# Patient Record
Sex: MALE | Race: WHITE | Employment: UNEMPLOYED | URBAN - NONMETROPOLITAN AREA
[De-identification: names, ages, dates, MRNs, and addresses within clinical notes are randomized per-mention and may not be internally consistent; named-entity substitution may affect disease eponyms.]

---

## 2020-02-18 ENCOUNTER — HOSPITAL ENCOUNTER (INPATIENT)
Age: 60
LOS: 2 days | Discharge: HOME | DRG: 638 | End: 2020-02-20
Attending: EMERGENCY MEDICINE | Admitting: INTERNAL MEDICINE
Payer: OTHER GOVERNMENT

## 2020-02-18 PROBLEM — E87.20 ACIDOSIS: Status: ACTIVE | Noted: 2020-02-18

## 2020-02-18 PROBLEM — Z79.4 TYPE 2 DIABETES MELLITUS TREATED WITH INSULIN (HCC): Status: ACTIVE | Noted: 2020-02-18

## 2020-02-18 PROBLEM — E11.9 TYPE 2 DIABETES MELLITUS TREATED WITH INSULIN (HCC): Status: ACTIVE | Noted: 2020-02-18

## 2020-02-18 PROBLEM — E11.10 DKA, TYPE 2, NOT AT GOAL (HCC): Status: ACTIVE | Noted: 2020-02-18

## 2020-02-18 LAB
-: ABNORMAL
ABSOLUTE EOS #: <0.03 K/UL (ref 0–0.44)
ABSOLUTE IMMATURE GRANULOCYTE: 0.26 K/UL (ref 0–0.3)
ABSOLUTE LYMPH #: 1.72 K/UL (ref 1.1–3.7)
ABSOLUTE MONO #: 1.29 K/UL (ref 0.1–1.2)
ALLEN TEST: ABNORMAL
ALLEN TEST: ABNORMAL
AMORPHOUS: ABNORMAL
ANION GAP SERPL CALCULATED.3IONS-SCNC: 15 MMOL/L (ref 9–17)
ANION GAP SERPL CALCULATED.3IONS-SCNC: 39 MMOL/L (ref 9–17)
BACTERIA: ABNORMAL
BASOPHILS # BLD: 0 % (ref 0–2)
BASOPHILS ABSOLUTE: 0.09 K/UL (ref 0–0.2)
BETA-HYDROXYBUTYRATE: 10.82 MMOL/L (ref 0.02–0.27)
BILIRUBIN URINE: NEGATIVE
BUN BLDV-MCNC: 40 MG/DL (ref 6–20)
BUN BLDV-MCNC: 40 MG/DL (ref 6–20)
BUN/CREAT BLD: 24 (ref 9–20)
BUN/CREAT BLD: 40 (ref 9–20)
CALCIUM SERPL-MCNC: 8.2 MG/DL (ref 8.6–10.4)
CALCIUM SERPL-MCNC: 9.4 MG/DL (ref 8.6–10.4)
CARBOXYHEMOGLOBIN: ABNORMAL % (ref 0–5)
CARBOXYHEMOGLOBIN: ABNORMAL % (ref 0–5)
CASTS UA: ABNORMAL /LPF
CASTS UA: ABNORMAL /LPF
CHLORIDE BLD-SCNC: 79 MMOL/L (ref 98–107)
CHLORIDE BLD-SCNC: 94 MMOL/L (ref 98–107)
CHP ED QC CHECK: YES
CO2: 19 MMOL/L (ref 20–31)
CO2: 9 MMOL/L (ref 20–31)
COLOR: YELLOW
COMMENT UA: ABNORMAL
CREAT SERPL-MCNC: 1 MG/DL (ref 0.7–1.2)
CREAT SERPL-MCNC: 1.68 MG/DL (ref 0.7–1.2)
CRYSTALS, UA: ABNORMAL /HPF
DIFFERENTIAL TYPE: ABNORMAL
EOSINOPHILS RELATIVE PERCENT: 0 % (ref 1–4)
EPITHELIAL CELLS UA: ABNORMAL /HPF (ref 0–5)
FIO2: 21
FIO2: 21
GFR AFRICAN AMERICAN: 51 ML/MIN
GFR AFRICAN AMERICAN: >60 ML/MIN
GFR NON-AFRICAN AMERICAN: 42 ML/MIN
GFR NON-AFRICAN AMERICAN: >60 ML/MIN
GFR SERPL CREATININE-BSD FRML MDRD: ABNORMAL ML/MIN/{1.73_M2}
GLUCOSE BLD-MCNC: 141 MG/DL (ref 74–100)
GLUCOSE BLD-MCNC: 146 MG/DL (ref 74–100)
GLUCOSE BLD-MCNC: 157 MG/DL (ref 74–100)
GLUCOSE BLD-MCNC: 166 MG/DL (ref 74–100)
GLUCOSE BLD-MCNC: 182 MG/DL (ref 74–100)
GLUCOSE BLD-MCNC: 231 MG/DL (ref 74–100)
GLUCOSE BLD-MCNC: 233 MG/DL (ref 74–100)
GLUCOSE BLD-MCNC: 259 MG/DL (ref 70–99)
GLUCOSE BLD-MCNC: 259 MG/DL (ref 74–100)
GLUCOSE BLD-MCNC: 269 MG/DL
GLUCOSE BLD-MCNC: 269 MG/DL (ref 74–100)
GLUCOSE BLD-MCNC: 300 MG/DL
GLUCOSE BLD-MCNC: 300 MG/DL (ref 74–100)
GLUCOSE BLD-MCNC: 389 MG/DL
GLUCOSE BLD-MCNC: 389 MG/DL (ref 74–100)
GLUCOSE BLD-MCNC: 555 MG/DL
GLUCOSE BLD-MCNC: 555 MG/DL (ref 74–100)
GLUCOSE BLD-MCNC: 649 MG/DL (ref 70–99)
GLUCOSE BLD-MCNC: >600 MG/DL (ref 74–100)
GLUCOSE BLD-MCNC: NORMAL MG/DL
GLUCOSE URINE: ABNORMAL
HCO3 ARTERIAL: 13.6 MMOL/L (ref 22–26)
HCO3 ARTERIAL: 9.3 MMOL/L (ref 22–26)
HCT VFR BLD CALC: 44.6 % (ref 40.7–50.3)
HEMOGLOBIN: 14.7 G/DL (ref 13–17)
IMMATURE GRANULOCYTES: 1 %
KETONES, URINE: ABNORMAL
LACTIC ACID, SEPSIS WHOLE BLOOD: ABNORMAL MMOL/L (ref 0.5–1.9)
LACTIC ACID, SEPSIS: 3.9 MMOL/L (ref 0.5–1.9)
LACTIC ACID, WHOLE BLOOD: NORMAL MMOL/L (ref 0.7–2.1)
LACTIC ACID: 1.7 MMOL/L (ref 0.5–2.2)
LEUKOCYTE ESTERASE, URINE: NEGATIVE
LYMPHOCYTES # BLD: 8 % (ref 24–43)
MAGNESIUM: 1.9 MG/DL (ref 1.6–2.6)
MCH RBC QN AUTO: 31.9 PG (ref 25.2–33.5)
MCHC RBC AUTO-ENTMCNC: 33 G/DL (ref 28.4–34.8)
MCV RBC AUTO: 96.7 FL (ref 82.6–102.9)
METHEMOGLOBIN: ABNORMAL % (ref 0–1.9)
METHEMOGLOBIN: ABNORMAL % (ref 0–1.9)
MODE: ABNORMAL
MODE: ABNORMAL
MONOCYTES # BLD: 6 % (ref 3–12)
MUCUS: ABNORMAL
NEGATIVE BASE EXCESS, ART: 10.5 MMOL/L (ref 0–2)
NEGATIVE BASE EXCESS, ART: 16.2 MMOL/L (ref 0–2)
NITRITE, URINE: NEGATIVE
NOTIFICATION TIME: ABNORMAL
NOTIFICATION TIME: ABNORMAL
NOTIFICATION: ABNORMAL
NOTIFICATION: ABNORMAL
NRBC AUTOMATED: 0 PER 100 WBC
O2 DEVICE/FLOW/%: ABNORMAL
O2 DEVICE/FLOW/%: ABNORMAL
O2 SAT, ARTERIAL: 96.4 % (ref 95–98)
O2 SAT, ARTERIAL: 97.4 % (ref 95–98)
OTHER OBSERVATIONS UA: ABNORMAL
OXYHEMOGLOBIN: ABNORMAL % (ref 95–98)
OXYHEMOGLOBIN: ABNORMAL % (ref 95–98)
PATIENT TEMP: 37
PATIENT TEMP: 37
PCO2 ARTERIAL: 22.8 MMHG (ref 35–45)
PCO2 ARTERIAL: 26.3 MMHG (ref 35–45)
PCO2, ART, TEMP ADJ: ABNORMAL (ref 35–45)
PCO2, ART, TEMP ADJ: ABNORMAL (ref 35–45)
PDW BLD-RTO: 12.2 % (ref 11.8–14.4)
PEEP/CPAP: ABNORMAL
PEEP/CPAP: ABNORMAL
PH ARTERIAL: 7.23 (ref 7.35–7.45)
PH ARTERIAL: 7.33 (ref 7.35–7.45)
PH UA: 6 (ref 5–9)
PH, ART, TEMP ADJ: ABNORMAL (ref 7.35–7.45)
PH, ART, TEMP ADJ: ABNORMAL (ref 7.35–7.45)
PHOSPHORUS: 1.4 MG/DL (ref 2.5–4.5)
PLATELET # BLD: 271 K/UL (ref 138–453)
PLATELET ESTIMATE: ABNORMAL
PMV BLD AUTO: 11.5 FL (ref 8.1–13.5)
PO2 ARTERIAL: 111.8 MMHG (ref 80–100)
PO2 ARTERIAL: 89 MMHG (ref 80–100)
PO2, ART, TEMP ADJ: ABNORMAL MMHG (ref 80–100)
PO2, ART, TEMP ADJ: ABNORMAL MMHG (ref 80–100)
POSITIVE BASE EXCESS, ART: ABNORMAL MMOL/L (ref 0–2)
POSITIVE BASE EXCESS, ART: ABNORMAL MMOL/L (ref 0–2)
POTASSIUM SERPL-SCNC: 3.9 MMOL/L (ref 3.7–5.3)
POTASSIUM SERPL-SCNC: 4 MMOL/L (ref 3.7–5.3)
PROTEIN UA: ABNORMAL
PSV: ABNORMAL
PSV: ABNORMAL
PT. POSITION: ABNORMAL
PT. POSITION: ABNORMAL
RBC # BLD: 4.61 M/UL (ref 4.21–5.77)
RBC # BLD: ABNORMAL 10*6/UL
RBC UA: ABNORMAL /HPF (ref 0–2)
RENAL EPITHELIAL, UA: ABNORMAL /HPF
RESPIRATORY RATE: ABNORMAL
RESPIRATORY RATE: ABNORMAL
SAMPLE SITE: ABNORMAL
SAMPLE SITE: ABNORMAL
SEG NEUTROPHILS: 85 % (ref 36–65)
SEGMENTED NEUTROPHILS ABSOLUTE COUNT: 17.16 K/UL (ref 1.5–8.1)
SET RATE: ABNORMAL
SET RATE: ABNORMAL
SODIUM BLD-SCNC: 127 MMOL/L (ref 135–144)
SODIUM BLD-SCNC: 128 MMOL/L (ref 135–144)
SPECIFIC GRAVITY UA: 1.02 (ref 1.01–1.02)
TEXT FOR RESPIRATORY: ABNORMAL
TEXT FOR RESPIRATORY: ABNORMAL
TOTAL HB: ABNORMAL G/DL (ref 12–16)
TOTAL HB: ABNORMAL G/DL (ref 12–16)
TOTAL RATE: ABNORMAL
TOTAL RATE: ABNORMAL
TRICHOMONAS: ABNORMAL
TROPONIN INTERP: ABNORMAL
TROPONIN T: ABNORMAL NG/ML
TROPONIN, HIGH SENSITIVITY: 23 NG/L (ref 0–22)
TURBIDITY: CLEAR
URINE HGB: NEGATIVE
UROBILINOGEN, URINE: NORMAL
VT: ABNORMAL
VT: ABNORMAL
WBC # BLD: 20.5 K/UL (ref 3.5–11.3)
WBC # BLD: ABNORMAL 10*3/UL
WBC UA: ABNORMAL /HPF (ref 0–5)
YEAST: ABNORMAL

## 2020-02-18 PROCEDURE — 6360000002 HC RX W HCPCS: Performed by: INTERNAL MEDICINE

## 2020-02-18 PROCEDURE — 96361 HYDRATE IV INFUSION ADD-ON: CPT

## 2020-02-18 PROCEDURE — 6370000000 HC RX 637 (ALT 250 FOR IP): Performed by: INTERNAL MEDICINE

## 2020-02-18 PROCEDURE — 84484 ASSAY OF TROPONIN QUANT: CPT

## 2020-02-18 PROCEDURE — 6370000000 HC RX 637 (ALT 250 FOR IP): Performed by: EMERGENCY MEDICINE

## 2020-02-18 PROCEDURE — 82010 KETONE BODYS QUAN: CPT

## 2020-02-18 PROCEDURE — 2500000003 HC RX 250 WO HCPCS: Performed by: INTERNAL MEDICINE

## 2020-02-18 PROCEDURE — 36415 COLL VENOUS BLD VENIPUNCTURE: CPT

## 2020-02-18 PROCEDURE — 84100 ASSAY OF PHOSPHORUS: CPT

## 2020-02-18 PROCEDURE — 82805 BLOOD GASES W/O2 SATURATION: CPT

## 2020-02-18 PROCEDURE — 82947 ASSAY GLUCOSE BLOOD QUANT: CPT

## 2020-02-18 PROCEDURE — 96374 THER/PROPH/DIAG INJ IV PUSH: CPT

## 2020-02-18 PROCEDURE — 2000000000 HC ICU R&B

## 2020-02-18 PROCEDURE — 80048 BASIC METABOLIC PNL TOTAL CA: CPT

## 2020-02-18 PROCEDURE — 81001 URINALYSIS AUTO W/SCOPE: CPT

## 2020-02-18 PROCEDURE — 83605 ASSAY OF LACTIC ACID: CPT

## 2020-02-18 PROCEDURE — 87040 BLOOD CULTURE FOR BACTERIA: CPT

## 2020-02-18 PROCEDURE — 83735 ASSAY OF MAGNESIUM: CPT

## 2020-02-18 PROCEDURE — 2580000003 HC RX 258: Performed by: INTERNAL MEDICINE

## 2020-02-18 PROCEDURE — 99285 EMERGENCY DEPT VISIT HI MDM: CPT

## 2020-02-18 PROCEDURE — 85025 COMPLETE CBC W/AUTO DIFF WBC: CPT

## 2020-02-18 PROCEDURE — 96376 TX/PRO/DX INJ SAME DRUG ADON: CPT

## 2020-02-18 PROCEDURE — 2580000003 HC RX 258: Performed by: EMERGENCY MEDICINE

## 2020-02-18 PROCEDURE — 6370000000 HC RX 637 (ALT 250 FOR IP): Performed by: NURSE PRACTITIONER

## 2020-02-18 RX ORDER — DEXTROSE, SODIUM CHLORIDE, AND POTASSIUM CHLORIDE 5; .45; .15 G/100ML; G/100ML; G/100ML
INJECTION INTRAVENOUS CONTINUOUS PRN
Status: DISCONTINUED | OUTPATIENT
Start: 2020-02-18 | End: 2020-02-19

## 2020-02-18 RX ORDER — MAGNESIUM SULFATE 1 G/100ML
1 INJECTION INTRAVENOUS PRN
Status: DISCONTINUED | OUTPATIENT
Start: 2020-02-18 | End: 2020-02-19

## 2020-02-18 RX ORDER — SODIUM PHOSPHATE, MONOBASIC, MONOHYDRATE 276; 142 MG/ML; MG/ML
INJECTION, SOLUTION INTRAVENOUS
Status: DISPENSED
Start: 2020-02-18 | End: 2020-02-19

## 2020-02-18 RX ORDER — SODIUM CHLORIDE 9 MG/ML
INJECTION, SOLUTION INTRAVENOUS CONTINUOUS
Status: DISCONTINUED | OUTPATIENT
Start: 2020-02-18 | End: 2020-02-19

## 2020-02-18 RX ORDER — DEXTROSE MONOHYDRATE 25 G/50ML
12.5 INJECTION, SOLUTION INTRAVENOUS PRN
Status: DISCONTINUED | OUTPATIENT
Start: 2020-02-18 | End: 2020-02-19

## 2020-02-18 RX ORDER — POTASSIUM CHLORIDE 7.45 MG/ML
10 INJECTION INTRAVENOUS PRN
Status: DISCONTINUED | OUTPATIENT
Start: 2020-02-18 | End: 2020-02-19

## 2020-02-18 RX ORDER — LISINOPRIL 10 MG/1
10 TABLET ORAL DAILY
COMMUNITY

## 2020-02-18 RX ORDER — ACETAMINOPHEN 325 MG/1
650 TABLET ORAL EVERY 4 HOURS PRN
Status: DISCONTINUED | OUTPATIENT
Start: 2020-02-18 | End: 2020-02-20

## 2020-02-18 RX ORDER — 0.9 % SODIUM CHLORIDE 0.9 %
15 INTRAVENOUS SOLUTION INTRAVENOUS ONCE
Status: COMPLETED | OUTPATIENT
Start: 2020-02-18 | End: 2020-02-18

## 2020-02-18 RX ADMIN — INSULIN HUMAN 10 UNITS: 100 INJECTION, SOLUTION PARENTERAL at 10:06

## 2020-02-18 RX ADMIN — POTASSIUM CHLORIDE 10 MEQ: 7.46 INJECTION, SOLUTION INTRAVENOUS at 19:35

## 2020-02-18 RX ADMIN — SODIUM CHLORIDE 5.67 UNITS/HR: 9 INJECTION, SOLUTION INTRAVENOUS at 15:50

## 2020-02-18 RX ADMIN — ACETAMINOPHEN 650 MG: 325 TABLET, FILM COATED ORAL at 15:46

## 2020-02-18 RX ADMIN — INSULIN HUMAN 10 UNITS: 100 INJECTION, SOLUTION PARENTERAL at 10:37

## 2020-02-18 RX ADMIN — SODIUM CHLORIDE 1701 ML: 9 INJECTION, SOLUTION INTRAVENOUS at 10:06

## 2020-02-18 RX ADMIN — ACETAMINOPHEN 650 MG: 325 TABLET, FILM COATED ORAL at 20:09

## 2020-02-18 RX ADMIN — POTASSIUM CHLORIDE 10 MEQ: 7.46 INJECTION, SOLUTION INTRAVENOUS at 21:50

## 2020-02-18 RX ADMIN — SODIUM PHOSPHATE, MONOBASIC, MONOHYDRATE 20 MMOL: 276; 142 INJECTION, SOLUTION INTRAVENOUS at 19:35

## 2020-02-18 RX ADMIN — INSULIN HUMAN 5 UNITS: 100 INJECTION, SOLUTION PARENTERAL at 14:02

## 2020-02-18 RX ADMIN — ENOXAPARIN SODIUM 40 MG: 40 INJECTION, SOLUTION INTRAVENOUS; SUBCUTANEOUS at 15:46

## 2020-02-18 RX ADMIN — DEXTROSE, SODIUM CHLORIDE, AND POTASSIUM CHLORIDE: 5; .45; .15 INJECTION INTRAVENOUS at 16:54

## 2020-02-18 RX ADMIN — POTASSIUM CHLORIDE 10 MEQ: 7.46 INJECTION, SOLUTION INTRAVENOUS at 20:37

## 2020-02-18 RX ADMIN — INSULIN HUMAN 10 UNITS: 100 INJECTION, SOLUTION PARENTERAL at 12:05

## 2020-02-18 RX ADMIN — SODIUM CHLORIDE 851 ML: 9 INJECTION, SOLUTION INTRAVENOUS at 15:49

## 2020-02-18 ASSESSMENT — ENCOUNTER SYMPTOMS
WHEEZING: 0
ABDOMINAL PAIN: 1
ABDOMINAL DISTENTION: 0
SHORTNESS OF BREATH: 0
CONSTIPATION: 0
DIARRHEA: 1
VOMITING: 1
NAUSEA: 1
COLOR CHANGE: 0
COUGH: 0

## 2020-02-18 ASSESSMENT — PAIN SCALES - GENERAL
PAINLEVEL_OUTOF10: 3
PAINLEVEL_OUTOF10: 7
PAINLEVEL_OUTOF10: 6
PAINLEVEL_OUTOF10: 0
PAINLEVEL_OUTOF10: 6
PAINLEVEL_OUTOF10: 3
PAINLEVEL_OUTOF10: 0

## 2020-02-18 ASSESSMENT — PAIN DESCRIPTION - LOCATION
LOCATION: HEAD

## 2020-02-18 ASSESSMENT — PAIN DESCRIPTION - PAIN TYPE
TYPE: ACUTE PAIN

## 2020-02-18 ASSESSMENT — PAIN DESCRIPTION - DESCRIPTORS
DESCRIPTORS: ACHING
DESCRIPTORS: HEADACHE

## 2020-02-18 ASSESSMENT — PAIN DESCRIPTION - ONSET
ONSET: SUDDEN
ONSET: ON-GOING
ONSET: ON-GOING

## 2020-02-18 ASSESSMENT — PAIN DESCRIPTION - PROGRESSION: CLINICAL_PROGRESSION: NOT CHANGED

## 2020-02-18 NOTE — ED PROVIDER NOTES
HISTORY     History reviewed. No pertinent surgical history. CURRENT MEDICATIONS       Previous Medications    ASPIRIN 81 MG TABLET    Take 81 mg by mouth daily    INSULIN ASPART (NOVOLOG SC)    Inject into the skin Uses pump    LISINOPRIL (PRINIVIL;ZESTRIL) 10 MG TABLET    Take 10 mg by mouth daily       ALLERGIES     Pcn [penicillins]    FAMILY HISTORY     History reviewed. No pertinent family history.        SOCIAL HISTORY       Social History     Socioeconomic History    Marital status: Single     Spouse name: None    Number of children: None    Years of education: None    Highest education level: None   Occupational History    None   Social Needs    Financial resource strain: None    Food insecurity:     Worry: None     Inability: None    Transportation needs:     Medical: None     Non-medical: None   Tobacco Use    Smoking status: Current Every Day Smoker     Packs/day: 1.00     Types: Cigarettes    Smokeless tobacco: Never Used   Substance and Sexual Activity    Alcohol use: Yes     Comment: social    Drug use: Never    Sexual activity: None   Lifestyle    Physical activity:     Days per week: None     Minutes per session: None    Stress: None   Relationships    Social connections:     Talks on phone: None     Gets together: None     Attends Ep[employer]alian service: None     Active member of club or organization: None     Attends meetings of clubs or organizations: None     Relationship status: None    Intimate partner violence:     Fear of current or ex partner: None     Emotionally abused: None     Physically abused: None     Forced sexual activity: None   Other Topics Concern    None   Social History Narrative    None       SCREENINGS    Hollis Coma Scale  Eye Opening: Spontaneous  Best Verbal Response: Oriented  Best Motor Response: Obeys commands  Dwight Coma Scale Score: 15        PHYSICAL EXAM  (up to 7 for level 4, 8 or more for level 5)     ED Triage Vitals [02/18/20 0917]   BP note:    No orders to display         ED BEDSIDE ULTRASOUND:   Performed by ED Physician - none    LABS:  Labs Reviewed   CBC WITH AUTO DIFFERENTIAL - Abnormal; Notable for the following components:       Result Value    WBC 20.5 (*)     Seg Neutrophils 85 (*)     Lymphocytes 8 (*)     Eosinophils % 0 (*)     Immature Granulocytes 1 (*)     Segs Absolute 17.16 (*)     Absolute Mono # 1.29 (*)     All other components within normal limits   BASIC METABOLIC PANEL - Abnormal; Notable for the following components:    Glucose 649 (*)     BUN 40 (*)     CREATININE 1.68 (*)     Bun/Cre Ratio 24 (*)     Sodium 127 (*)     Chloride 79 (*)     CO2 9 (*)     Anion Gap 39 (*)     GFR Non- 42 (*)     GFR  51 (*)     All other components within normal limits   TROPONIN - Abnormal; Notable for the following components:    Troponin, High Sensitivity 23 (*)     All other components within normal limits   URINALYSIS WITH MICROSCOPIC - Abnormal; Notable for the following components:    Glucose, Ur 4+ (*)     Ketones, Urine 3+ (*)     Specific Gravity, UA 1.025 (*)     Protein, UA TRACE (*)     Bacteria, UA TRACE (*)     All other components within normal limits   BETA-HYDROXYBUTYRATE - Abnormal; Notable for the following components:    Beta-Hydroxybutyrate 10.82 (*)     All other components within normal limits   LACTATE, SEPSIS - Abnormal; Notable for the following components:    Lactic Acid, Sepsis 3.9 (*)     All other components within normal limits   BLOOD GAS, ARTERIAL - Abnormal; Notable for the following components:    pH, Arterial 7.229 (*)     pCO2, Arterial 22.8 (*)     pO2, Arterial 111.8 (*)     HCO3, Arterial 9.3 (*)     Negative Base Excess, Art 16.2 (*)     All other components within normal limits   GLUCOSE, WHOLE BLOOD - Abnormal; Notable for the following components:    POC Glucose >600 (*)     All other components within normal limits   GLUCOSE, WHOLE BLOOD - Abnormal; Notable for the following components:    POC Glucose 555 (*)     All other components within normal limits   GLUCOSE, WHOLE BLOOD - Abnormal; Notable for the following components:    POC Glucose 389 (*)     All other components within normal limits   BLOOD GAS, ARTERIAL - Abnormal; Notable for the following components:    pH, Arterial 7.330 (*)     pCO2, Arterial 26.3 (*)     HCO3, Arterial 13.6 (*)     Negative Base Excess, Art 10.5 (*)     All other components within normal limits   GLUCOSE, WHOLE BLOOD - Abnormal; Notable for the following components:    POC Glucose 300 (*)     All other components within normal limits   POCT GLUCOSE - Normal   POCT GLUCOSE - Normal   POCT GLUCOSE - Normal   POCT GLUCOSE - Normal   CULTURE BLOOD #1   CULTURE BLOOD #2   LACTIC ACID, PLASMA       EMERGENCY DEPARTMENT COURSE and DIFFERENTIAL DIAGNOSIS/MDM:   Vitals:    Vitals:    02/18/20 1104 02/18/20 1148 02/18/20 1206 02/18/20 1341   BP: 95/62 98/62 100/74 111/79   Pulse: 106 125 114 106   Resp: 20 20 20 20   Temp:       TempSrc:       SpO2: 100% 99% 99% 97%   Weight:               MDM patient is doing much better with treatment. He will be admitted to Dr. Loren Isbell as hospitalist.    CONSULTS:  IP CONSULT TO CASE MANAGEMENT  IP CONSULT TO DIABETES EDUCATOR  IP CONSULT TO DIETITIAN    PROCEDURES:  Unless otherwise noted below, none     Procedures    FINAL IMPRESSION      1. Type 2 diabetes mellitus treated with insulin ([facility]    2. Acidosis          DISPOSITION/PLAN   DISPOSITION Admitted 02/18/2020 01:59:51 PM      PATIENT REFERRED TO:  No follow-up provider specified.     DISCHARGE MEDICATIONS:  New Prescriptions    No medications on file              Summation      Patient Course: Improved substantially with treatment    ED Medications administered this visit:    Medications   insulin regular (HUMULIN R;NOVOLIN R) injection 5 Units (has no administration in time range)   0.9 % NaCl bolus ( Intravenous Rate/Dose Change 2/18/20 1100)   insulin

## 2020-02-18 NOTE — ED NOTES
Difficult lab draw. 4 IV attempts.  Lab called to obtain blood cultures and lactic acid     Gareth Box RN  02/18/20 101

## 2020-02-19 LAB
ABSOLUTE EOS #: <0.03 K/UL (ref 0–0.44)
ABSOLUTE IMMATURE GRANULOCYTE: 0.1 K/UL (ref 0–0.3)
ABSOLUTE LYMPH #: 1.66 K/UL (ref 1.1–3.7)
ABSOLUTE MONO #: 1.31 K/UL (ref 0.1–1.2)
ANION GAP SERPL CALCULATED.3IONS-SCNC: 13 MMOL/L (ref 9–17)
ANION GAP SERPL CALCULATED.3IONS-SCNC: 13 MMOL/L (ref 9–17)
ANION GAP SERPL CALCULATED.3IONS-SCNC: 14 MMOL/L (ref 9–17)
BASOPHILS # BLD: 0 % (ref 0–2)
BASOPHILS ABSOLUTE: <0.03 K/UL (ref 0–0.2)
BUN BLDV-MCNC: 18 MG/DL (ref 6–20)
BUN BLDV-MCNC: 24 MG/DL (ref 6–20)
BUN BLDV-MCNC: 26 MG/DL (ref 6–20)
BUN/CREAT BLD: 29 (ref 9–20)
BUN/CREAT BLD: 32 (ref 9–20)
BUN/CREAT BLD: 35 (ref 9–20)
CALCIUM SERPL-MCNC: 8.2 MG/DL (ref 8.6–10.4)
CALCIUM SERPL-MCNC: 8.3 MG/DL (ref 8.6–10.4)
CALCIUM SERPL-MCNC: 8.3 MG/DL (ref 8.6–10.4)
CHLORIDE BLD-SCNC: 95 MMOL/L (ref 98–107)
CHLORIDE BLD-SCNC: 96 MMOL/L (ref 98–107)
CHLORIDE BLD-SCNC: 98 MMOL/L (ref 98–107)
CO2: 19 MMOL/L (ref 20–31)
CO2: 19 MMOL/L (ref 20–31)
CO2: 21 MMOL/L (ref 20–31)
CREAT SERPL-MCNC: 0.63 MG/DL (ref 0.7–1.2)
CREAT SERPL-MCNC: 0.75 MG/DL (ref 0.7–1.2)
CREAT SERPL-MCNC: 0.75 MG/DL (ref 0.7–1.2)
DIFFERENTIAL TYPE: ABNORMAL
EOSINOPHILS RELATIVE PERCENT: 0 % (ref 1–4)
ESTIMATED AVERAGE GLUCOSE: 246 MG/DL
GFR AFRICAN AMERICAN: >60 ML/MIN
GFR NON-AFRICAN AMERICAN: >60 ML/MIN
GFR SERPL CREATININE-BSD FRML MDRD: ABNORMAL ML/MIN/{1.73_M2}
GLUCOSE BLD-MCNC: 107 MG/DL (ref 74–100)
GLUCOSE BLD-MCNC: 131 MG/DL (ref 74–100)
GLUCOSE BLD-MCNC: 139 MG/DL (ref 70–99)
GLUCOSE BLD-MCNC: 157 MG/DL (ref 74–100)
GLUCOSE BLD-MCNC: 158 MG/DL (ref 74–100)
GLUCOSE BLD-MCNC: 172 MG/DL (ref 74–100)
GLUCOSE BLD-MCNC: 180 MG/DL (ref 74–100)
GLUCOSE BLD-MCNC: 193 MG/DL (ref 74–100)
GLUCOSE BLD-MCNC: 203 MG/DL (ref 74–100)
GLUCOSE BLD-MCNC: 204 MG/DL (ref 74–100)
GLUCOSE BLD-MCNC: 205 MG/DL (ref 70–99)
GLUCOSE BLD-MCNC: 208 MG/DL (ref 74–100)
GLUCOSE BLD-MCNC: 217 MG/DL (ref 74–100)
GLUCOSE BLD-MCNC: 230 MG/DL (ref 70–99)
GLUCOSE BLD-MCNC: 339 MG/DL (ref 74–100)
HBA1C MFR BLD: 10.2 % (ref 4.8–5.9)
HCT VFR BLD CALC: 31.3 % (ref 40.7–50.3)
HEMOGLOBIN: 11.1 G/DL (ref 13–17)
IMMATURE GRANULOCYTES: 1 %
LYMPHOCYTES # BLD: 11 % (ref 24–43)
MAGNESIUM: 1.8 MG/DL (ref 1.6–2.6)
MAGNESIUM: 1.9 MG/DL (ref 1.6–2.6)
MAGNESIUM: 2 MG/DL (ref 1.6–2.6)
MCH RBC QN AUTO: 32.1 PG (ref 25.2–33.5)
MCHC RBC AUTO-ENTMCNC: 35.5 G/DL (ref 28.4–34.8)
MCV RBC AUTO: 90.5 FL (ref 82.6–102.9)
MONOCYTES # BLD: 8 % (ref 3–12)
NRBC AUTOMATED: 0 PER 100 WBC
PDW BLD-RTO: 11.9 % (ref 11.8–14.4)
PHOSPHORUS: 2.1 MG/DL (ref 2.5–4.5)
PHOSPHORUS: 2.2 MG/DL (ref 2.5–4.5)
PHOSPHORUS: 2.5 MG/DL (ref 2.5–4.5)
PLATELET # BLD: 240 K/UL (ref 138–453)
PLATELET ESTIMATE: ABNORMAL
PMV BLD AUTO: 10 FL (ref 8.1–13.5)
POTASSIUM SERPL-SCNC: 3.9 MMOL/L (ref 3.7–5.3)
POTASSIUM SERPL-SCNC: 4.1 MMOL/L (ref 3.7–5.3)
POTASSIUM SERPL-SCNC: 4.1 MMOL/L (ref 3.7–5.3)
RBC # BLD: 3.46 M/UL (ref 4.21–5.77)
RBC # BLD: ABNORMAL 10*6/UL
SEG NEUTROPHILS: 80 % (ref 36–65)
SEGMENTED NEUTROPHILS ABSOLUTE COUNT: 12.51 K/UL (ref 1.5–8.1)
SODIUM BLD-SCNC: 128 MMOL/L (ref 135–144)
SODIUM BLD-SCNC: 128 MMOL/L (ref 135–144)
SODIUM BLD-SCNC: 132 MMOL/L (ref 135–144)
WBC # BLD: 15.6 K/UL (ref 3.5–11.3)
WBC # BLD: ABNORMAL 10*3/UL

## 2020-02-19 PROCEDURE — 36415 COLL VENOUS BLD VENIPUNCTURE: CPT

## 2020-02-19 PROCEDURE — 83036 HEMOGLOBIN GLYCOSYLATED A1C: CPT

## 2020-02-19 PROCEDURE — 85025 COMPLETE CBC W/AUTO DIFF WBC: CPT

## 2020-02-19 PROCEDURE — 6360000002 HC RX W HCPCS: Performed by: INTERNAL MEDICINE

## 2020-02-19 PROCEDURE — 2500000003 HC RX 250 WO HCPCS: Performed by: INTERNAL MEDICINE

## 2020-02-19 PROCEDURE — 80048 BASIC METABOLIC PNL TOTAL CA: CPT

## 2020-02-19 PROCEDURE — 82947 ASSAY GLUCOSE BLOOD QUANT: CPT

## 2020-02-19 PROCEDURE — 2580000003 HC RX 258: Performed by: INTERNAL MEDICINE

## 2020-02-19 PROCEDURE — 84100 ASSAY OF PHOSPHORUS: CPT

## 2020-02-19 PROCEDURE — 6370000000 HC RX 637 (ALT 250 FOR IP): Performed by: NURSE PRACTITIONER

## 2020-02-19 PROCEDURE — 6360000002 HC RX W HCPCS: Performed by: NURSE PRACTITIONER

## 2020-02-19 PROCEDURE — 83735 ASSAY OF MAGNESIUM: CPT

## 2020-02-19 PROCEDURE — 2000000000 HC ICU R&B

## 2020-02-19 RX ORDER — NICOTINE POLACRILEX 4 MG
15 LOZENGE BUCCAL PRN
Status: DISCONTINUED | OUTPATIENT
Start: 2020-02-19 | End: 2020-02-20 | Stop reason: HOSPADM

## 2020-02-19 RX ORDER — SODIUM PHOSPHATE, MONOBASIC, MONOHYDRATE 276; 142 MG/ML; MG/ML
INJECTION, SOLUTION INTRAVENOUS
Status: DISPENSED
Start: 2020-02-19 | End: 2020-02-19

## 2020-02-19 RX ORDER — DEXTROSE MONOHYDRATE 50 MG/ML
100 INJECTION, SOLUTION INTRAVENOUS PRN
Status: DISCONTINUED | OUTPATIENT
Start: 2020-02-19 | End: 2020-02-20 | Stop reason: HOSPADM

## 2020-02-19 RX ORDER — ACETAMINOPHEN 500 MG
1000 TABLET ORAL EVERY 6 HOURS PRN
Status: DISCONTINUED | OUTPATIENT
Start: 2020-02-19 | End: 2020-02-20 | Stop reason: HOSPADM

## 2020-02-19 RX ORDER — ONDANSETRON 2 MG/ML
4 INJECTION INTRAMUSCULAR; INTRAVENOUS EVERY 6 HOURS PRN
Status: DISCONTINUED | OUTPATIENT
Start: 2020-02-19 | End: 2020-02-20 | Stop reason: HOSPADM

## 2020-02-19 RX ORDER — SODIUM CHLORIDE AND POTASSIUM CHLORIDE .9; .15 G/100ML; G/100ML
SOLUTION INTRAVENOUS CONTINUOUS
Status: DISCONTINUED | OUTPATIENT
Start: 2020-02-19 | End: 2020-02-20 | Stop reason: HOSPADM

## 2020-02-19 RX ORDER — DEXTROSE MONOHYDRATE 25 G/50ML
12.5 INJECTION, SOLUTION INTRAVENOUS PRN
Status: DISCONTINUED | OUTPATIENT
Start: 2020-02-19 | End: 2020-02-20 | Stop reason: HOSPADM

## 2020-02-19 RX ADMIN — INSULIN LISPRO 12 UNITS: 100 INJECTION, SOLUTION INTRAVENOUS; SUBCUTANEOUS at 16:58

## 2020-02-19 RX ADMIN — POTASSIUM CHLORIDE AND SODIUM CHLORIDE: 900; 150 INJECTION, SOLUTION INTRAVENOUS at 08:10

## 2020-02-19 RX ADMIN — ACETAMINOPHEN 1000 MG: 500 TABLET, FILM COATED ORAL at 07:20

## 2020-02-19 RX ADMIN — POTASSIUM CHLORIDE AND SODIUM CHLORIDE: 900; 150 INJECTION, SOLUTION INTRAVENOUS at 14:58

## 2020-02-19 RX ADMIN — INSULIN LISPRO 3 UNITS: 100 INJECTION, SOLUTION INTRAVENOUS; SUBCUTANEOUS at 20:26

## 2020-02-19 RX ADMIN — POTASSIUM CHLORIDE 10 MEQ: 7.46 INJECTION, SOLUTION INTRAVENOUS at 05:03

## 2020-02-19 RX ADMIN — POTASSIUM CHLORIDE 10 MEQ: 7.46 INJECTION, SOLUTION INTRAVENOUS at 03:56

## 2020-02-19 RX ADMIN — INSULIN LISPRO 10 UNITS: 100 INJECTION, SOLUTION INTRAVENOUS; SUBCUTANEOUS at 08:13

## 2020-02-19 RX ADMIN — SODIUM PHOSPHATE, MONOBASIC, MONOHYDRATE 15 MMOL: 276; 142 INJECTION, SOLUTION INTRAVENOUS at 03:57

## 2020-02-19 RX ADMIN — POTASSIUM CHLORIDE 10 MEQ: 7.46 INJECTION, SOLUTION INTRAVENOUS at 06:08

## 2020-02-19 RX ADMIN — ONDANSETRON 4 MG: 2 INJECTION INTRAMUSCULAR; INTRAVENOUS at 23:43

## 2020-02-19 RX ADMIN — ONDANSETRON 4 MG: 2 INJECTION INTRAMUSCULAR; INTRAVENOUS at 17:35

## 2020-02-19 RX ADMIN — ENOXAPARIN SODIUM 40 MG: 40 INJECTION, SOLUTION INTRAVENOUS; SUBCUTANEOUS at 08:18

## 2020-02-19 RX ADMIN — ACETAMINOPHEN 1000 MG: 500 TABLET, FILM COATED ORAL at 23:44

## 2020-02-19 RX ADMIN — POTASSIUM CHLORIDE AND SODIUM CHLORIDE: 900; 150 INJECTION, SOLUTION INTRAVENOUS at 21:14

## 2020-02-19 ASSESSMENT — PAIN SCALES - GENERAL
PAINLEVEL_OUTOF10: 0
PAINLEVEL_OUTOF10: 10
PAINLEVEL_OUTOF10: 0
PAINLEVEL_OUTOF10: 10
PAINLEVEL_OUTOF10: 0

## 2020-02-19 ASSESSMENT — PAIN DESCRIPTION - PROGRESSION: CLINICAL_PROGRESSION: NOT CHANGED

## 2020-02-19 ASSESSMENT — PAIN DESCRIPTION - FREQUENCY: FREQUENCY: CONTINUOUS

## 2020-02-19 ASSESSMENT — PAIN DESCRIPTION - LOCATION: LOCATION: HEAD

## 2020-02-19 ASSESSMENT — PAIN DESCRIPTION - ONSET: ONSET: ON-GOING

## 2020-02-19 ASSESSMENT — PAIN DESCRIPTION - DESCRIPTORS: DESCRIPTORS: HEADACHE

## 2020-02-19 ASSESSMENT — PAIN DESCRIPTION - PAIN TYPE: TYPE: ACUTE PAIN

## 2020-02-19 NOTE — CARE COORDINATION
Spoke with Sundeep Sandoval at the [facility] in [place], with Trini Hurley, unit manager, reviewed the settings in pump. Asked if the patient had a sensor and was in auto mode- advised it did not appear so. Patient was approved for a sensor initially. No settings in auto mode noted, so patient never worked with sensor. Recommendations received from Sundeep Sandoval for basal adjustments. Asked that recommendations be faxed to us for reference. Sundeep Sandoval will reach out to the patient on Friday to continue following the patient until he can get back to [place].      Sundeep Sandoval [phone]   Fax [phone]

## 2020-02-19 NOTE — PLAN OF CARE
Problem: Falls - Risk of:  Goal: Will remain free from falls  Description  Will remain free from falls  Outcome: Ongoing  Note:   Pt is at medium risk for falls. Non skid socks on. Bed in low position and wheels locked. Fall sign posted and bracelet on. Siderails up x2. Pt A & O x4, uses call light properly. Call light within reach. Will continue to assess. Problem: Pain:  Goal: Pain level will decrease  Description  Pain level will decrease  Outcome: Ongoing  Note:   Pain meds given as needed & as ordered. Will continue to assess. Problem: Fluid Volume - Imbalance:  Goal: Absence of imbalanced fluid volume signs and symptoms  Description  Absence of imbalanced fluid volume signs and symptoms  Outcome: Ongoing  Note:   IV fluids infusing. Oral fluids encouraged. Urine output measured. I & O monitored. Daily weights. Labs drawn daily. Will continue to assess. Problem: Serum Glucose Level - Abnormal:  Goal: Ability to maintain appropriate glucose levels will improve  Description  Ability to maintain appropriate glucose levels will improve  Outcome: Ongoing  Note:   FSBS every hour. Insulin dose changed accordingly to infusion protocol. Will continue to monitor.

## 2020-02-19 NOTE — H&P
distress  EYES: No gross abnormalities. , PERRL and EOMI  NECK: normal, supple, no lymphadenopathy,  no carotid bruits  PULM: clear to auscultation bilaterally- no wheezes, rales or rhonchi, normal air movement, no respiratory distress  COR: regular rate & rhythm, no murmurs, no gallops, S1 normal and S2 normal  ABD:  soft, non-tender, non-distended, normal bowel sounds, no masses or organomegaly  EXT:   no cyanosis, clubbing or edema present    NEURO: follows commands, VENTURA, no deficits  SKIN:  no rashes or significant lesions  -----------------------------------------------------------------  Diagnostic Data:   Lab Results   Component Value Date    WBC 15.6 (H) 02/19/2020    HGB 11.1 (L) 02/19/2020     02/19/2020       Lab Results   Component Value Date    BUN 24 (H) 02/19/2020    CREATININE 0.75 02/19/2020     (L) 02/19/2020    K 4.1 02/19/2020    CALCIUM 8.3 (L) 02/19/2020    CL 96 (L) 02/19/2020    CO2 19 (L) 02/19/2020    LABGLOM >60 02/19/2020       Lab Results   Component Value Date    WBCUA 0 TO 2 02/18/2020    RBCUA 0 TO 2 02/18/2020    EPITHUA 0 TO 2 02/18/2020    LEUKOCYTESUR NEGATIVE 02/18/2020    SPECGRAV 1.025 (H) 02/18/2020    GLUCOSEU 4+ (A) 02/18/2020    KETUA 3+ (A) 02/18/2020    PROTEINU TRACE (A) 02/18/2020    HGBUR NEGATIVE 02/18/2020    CRYSTUA NOT REPORTED 02/18/2020    BACTERIA TRACE (A) 02/18/2020    YEAST NOT REPORTED 02/18/2020       Lab Results   Component Value Date    TROPONINT NOT REPORTED 02/18/2020       No results found. Assessment:    Active Problems:    Type 2 diabetes mellitus treated with insulin (HCC)    Acidosis  Resolved Problems:    * No resolved hospital problems.  *      Patient Active Problem List    Diagnosis Date Noted    Type 2 diabetes mellitus treated with insulin ([facility] [address] 02/18/2020    Acidosis 02/18/2020       Plan:     · This patient requires inpatient admission because of Diabetic Ketoacidosis  · Factors affecting the medical complexity of this patient include Type I DM, HTN  · Estimated length of stay is 3 days  · Insulin Drip Protocol stopped this morning  · Give Humalog 10 units with breakfast this morning only, no other coverage  · Start High Dose Sliding Scale at Lunch  · FSBS AC & HS  · Change IV fluids to 0.9% NS with 20 meq of KCL at 150ml/hr  · 4gm carb diet  · Appreciate CM/ to assist with [employer] Insulin Pump concerns  · Diabetic Ed  · BMP, MG, Phos every 6 hours for the next 25 hours, then we will change to daily  · CBC daily  · 0.9% NS bolus of 30ml/kg given in the ED yesterday  · High risk medication monitoring: Insulin    CORE MEASURES  DVT prophylaxis: Lovenox  Decubitus ulcer present on admission: No  CODE STATUS: FULL CODE  Nutrition Status: good   Physical therapy: NA   Old Charts reviewed: Yes  EKG Reviewed:  Yes  Advance Directive Addressed: No    AZALEA Hwang, NP-C  2/19/2020, 8:49 AM

## 2020-02-19 NOTE — CARE COORDINATION
Explained patients right to have family, representative or physician notified of their admission. Patient has Declined for physician to be notified. Patient has Declined for family/representative to be notified. Spoke with patient regarding diabetes care. Follows with a physician in a [facility] facility in [place], [place]. States he was set to return back there April 15 to have insulin pump looked at. Information retrieved from bottom of pump. [facility]  [phone]  [id number]  [email or web address]

## 2020-02-20 VITALS
BODY MASS INDEX: 18.75 KG/M2 | DIASTOLIC BLOOD PRESSURE: 78 MMHG | OXYGEN SATURATION: 96 % | HEIGHT: 71 IN | SYSTOLIC BLOOD PRESSURE: 112 MMHG | HEART RATE: 99 BPM | RESPIRATION RATE: 18 BRPM | TEMPERATURE: 98.5 F | WEIGHT: 133.9 LBS

## 2020-02-20 PROBLEM — N17.9 AKI (ACUTE KIDNEY INJURY) (HCC): Status: ACTIVE | Noted: 2020-02-20

## 2020-02-20 LAB
ABSOLUTE EOS #: <0.03 K/UL (ref 0–0.44)
ABSOLUTE IMMATURE GRANULOCYTE: 0.03 K/UL (ref 0–0.3)
ABSOLUTE LYMPH #: 1.23 K/UL (ref 1.1–3.7)
ABSOLUTE MONO #: 0.69 K/UL (ref 0.1–1.2)
ANION GAP SERPL CALCULATED.3IONS-SCNC: 18 MMOL/L (ref 9–17)
BASOPHILS # BLD: 0 % (ref 0–2)
BASOPHILS ABSOLUTE: 0.03 K/UL (ref 0–0.2)
BUN BLDV-MCNC: 17 MG/DL (ref 6–20)
BUN/CREAT BLD: 29 (ref 9–20)
CALCIUM SERPL-MCNC: 8.2 MG/DL (ref 8.6–10.4)
CHLORIDE BLD-SCNC: 90 MMOL/L (ref 98–107)
CO2: 16 MMOL/L (ref 20–31)
CREAT SERPL-MCNC: 0.59 MG/DL (ref 0.7–1.2)
DIFFERENTIAL TYPE: ABNORMAL
EOSINOPHILS RELATIVE PERCENT: 0 % (ref 1–4)
GFR AFRICAN AMERICAN: >60 ML/MIN
GFR NON-AFRICAN AMERICAN: >60 ML/MIN
GFR SERPL CREATININE-BSD FRML MDRD: ABNORMAL ML/MIN/{1.73_M2}
GFR SERPL CREATININE-BSD FRML MDRD: ABNORMAL ML/MIN/{1.73_M2}
GLUCOSE BLD-MCNC: 213 MG/DL (ref 74–100)
GLUCOSE BLD-MCNC: 363 MG/DL (ref 74–100)
GLUCOSE BLD-MCNC: 380 MG/DL (ref 70–99)
HCT VFR BLD CALC: 32.9 % (ref 40.7–50.3)
HEMOGLOBIN: 10.9 G/DL (ref 13–17)
IMMATURE GRANULOCYTES: 0 %
LYMPHOCYTES # BLD: 18 % (ref 24–43)
MAGNESIUM: 2.2 MG/DL (ref 1.6–2.6)
MCH RBC QN AUTO: 32.2 PG (ref 25.2–33.5)
MCHC RBC AUTO-ENTMCNC: 33.1 G/DL (ref 28.4–34.8)
MCV RBC AUTO: 97.3 FL (ref 82.6–102.9)
MONOCYTES # BLD: 10 % (ref 3–12)
NRBC AUTOMATED: 0 PER 100 WBC
PDW BLD-RTO: 12.4 % (ref 11.8–14.4)
PHOSPHORUS: 2 MG/DL (ref 2.5–4.5)
PLATELET # BLD: 201 K/UL (ref 138–453)
PLATELET ESTIMATE: ABNORMAL
PMV BLD AUTO: 10.5 FL (ref 8.1–13.5)
POTASSIUM SERPL-SCNC: 4.6 MMOL/L (ref 3.7–5.3)
RBC # BLD: 3.38 M/UL (ref 4.21–5.77)
RBC # BLD: ABNORMAL 10*6/UL
SEG NEUTROPHILS: 71 % (ref 36–65)
SEGMENTED NEUTROPHILS ABSOLUTE COUNT: 4.83 K/UL (ref 1.5–8.1)
SODIUM BLD-SCNC: 124 MMOL/L (ref 135–144)
WBC # BLD: 6.8 K/UL (ref 3.5–11.3)
WBC # BLD: ABNORMAL 10*3/UL

## 2020-02-20 PROCEDURE — 83735 ASSAY OF MAGNESIUM: CPT

## 2020-02-20 PROCEDURE — 85025 COMPLETE CBC W/AUTO DIFF WBC: CPT

## 2020-02-20 PROCEDURE — 82947 ASSAY GLUCOSE BLOOD QUANT: CPT

## 2020-02-20 PROCEDURE — 80048 BASIC METABOLIC PNL TOTAL CA: CPT

## 2020-02-20 PROCEDURE — 36415 COLL VENOUS BLD VENIPUNCTURE: CPT

## 2020-02-20 PROCEDURE — 84100 ASSAY OF PHOSPHORUS: CPT

## 2020-02-20 PROCEDURE — 6370000000 HC RX 637 (ALT 250 FOR IP): Performed by: NURSE PRACTITIONER

## 2020-02-20 PROCEDURE — 6360000002 HC RX W HCPCS: Performed by: INTERNAL MEDICINE

## 2020-02-20 PROCEDURE — 6360000002 HC RX W HCPCS: Performed by: NURSE PRACTITIONER

## 2020-02-20 RX ORDER — TRAZODONE HYDROCHLORIDE 100 MG/1
50 TABLET ORAL NIGHTLY PRN
Qty: 10 TABLET | Refills: 0 | Status: SHIPPED | OUTPATIENT
Start: 2020-02-20

## 2020-02-20 RX ORDER — TRAZODONE HYDROCHLORIDE 50 MG/1
100 TABLET ORAL NIGHTLY PRN
Status: DISCONTINUED | OUTPATIENT
Start: 2020-02-20 | End: 2020-02-20 | Stop reason: HOSPADM

## 2020-02-20 RX ADMIN — POTASSIUM CHLORIDE AND SODIUM CHLORIDE: 900; 150 INJECTION, SOLUTION INTRAVENOUS at 03:39

## 2020-02-20 RX ADMIN — ENOXAPARIN SODIUM 40 MG: 40 INJECTION, SOLUTION INTRAVENOUS; SUBCUTANEOUS at 10:07

## 2020-02-20 RX ADMIN — INSULIN LISPRO 15 UNITS: 100 INJECTION, SOLUTION INTRAVENOUS; SUBCUTANEOUS at 08:00

## 2020-02-20 RX ADMIN — ONDANSETRON 4 MG: 2 INJECTION INTRAMUSCULAR; INTRAVENOUS at 11:43

## 2020-02-20 ASSESSMENT — PAIN SCALES - GENERAL
PAINLEVEL_OUTOF10: 0

## 2020-02-20 NOTE — PROGRESS NOTES
Called and spoke with Caren Sotomayor from diabetic education. She will try and see patient this afternoon.
Discharge instructions gone over with the patient at this time. Understanding noted. Patient belongings sent home. Patient ambulatory upon discharge.
Facsimile Transmission Cover Sheet    Information contained in this transmission is for the sole use of the intended recipients and may contain confidential and privileged information. Any unauthorized review, use, disclosure or distribution is prohibited. If you are not the intended recipient, please contact the sender and destroy all copies of the original message. Disclosure is made for the purpose of healthcare operations and continuity of care. To: __Dr Curry________________________    From: [facility]    Fax Number: [phone]    Sender:_Lisa Ayon RN_________________ Phone Number:_[phone]____________      This request is: Urgent - Yes    Information and/or questions regarding patient condition:    Patient is no longer on Insulin drip. Noon labs improved. Can we change his Phosphorus, BMP, and Magnesium to daily starting tomorrow morning instead of every 6 hours? Thanks!
Patient checked his sugar and that was 209, insulin pump on.
Pt c/o of not being able to sleep and has not sleep for 4 days. Pt states the Tylenol didn't help. Writer offered a warm blanket but pt refused one stating \"it's too warm in here\". Writer turned thremostat down. Pt states he is exhausted and cannot shut his mind off. Call light in reach. Will continue to monitor.
Pt requesting zofran to help with nausea and extra strength tylenol to help him sleep. Pt c/o not being able to sleep and that he hasn't been able to sleep for about 4 days. Pt doesn't normally take any medications to help him sleep. Call light in reach. Will continue to assess.
Pt states the zofran really helped. Pt denies any pain at this time or any other needs. Assessment and vitals obtained and charted. DIHI=337. Insulin given as ordered. IV fluids continue to infuse. Pt A & O x4. Call light in reach. Will continue to monitor.
Social Work intial Assessment/Discharge Plan      Diagnosis:  Type 2 diabetes mellitus treated with insulin    Met with:  Patient      PCP:  None. Patient resides in [place], [place] and owns property in this area. He has been here a few months and plans to return home in April. He has a local doctor there. He has seen Dr. Carlos Melton during his stay here. He states that for the remainder of his stay in [address] [place] [address] or for any follow up, he will see a local [place] doctor even if they are in [place]. Payment Source:  's Administration    Advance Directives:  None    Code Status:  Full    Mental Status:  Alert and oriented. Very friendly. Living Arrangement:  Until he returns to [place], he is living locally with his girlfriends family in [address] . Support Systems:  Girlfriend, family and friends in both [address] [place] [address] and [place]    Patient able to perform ADL's:  Independant    Current Services:  None    Current DME Equipment:  Insulin Pump    Able to get medication fill & pharmacy:  Patient is able to have his medications filled by his local pharmacy in [place], [place] and have it shipped to him. His medications are paid for by the [employer]. Transportation provider:  [address] [place] [address] List of SNF/HH were provided:  N/A    Any concerns or Barriers to discharge:  None, as long as he stays in communication with his health care professionals and the [employer] in [place], [place], and the local area. Anticipated Needs/Discharge Plan:  Patient will continue to use an insulin pump. He is awaiting his next supply of insulin in the next day or two being shipped from his local pharmacy in [place], [place]. He will return to his temporary/seasonal home in [address]  and stay there until April 15 th, at which time he will return to his permanent home in [place], [place].
Toribio Gerardo from diabetes education is at patients bedside
Writer updated AMIRAH Saba on pt c/o not sleeping and wanting a sleep aide at 0300 this am. No new orders at this time.
rashes and no lesions  EXT:     no cyanosis, clubbing or edema present    -----------------------------------------------------------------  Diagnostic Data: Reviewed    ASSESSMENT:   Active Problems:    Type 2 diabetes mellitus treated with insulin (HCC)    Acidosis    BER[facility]ARDO [facility] from DKA  Resolved Problems:    * No resolved hospital problems.  *        PLAN:  · [facility]  · Place patient's insulin pump on patient  · Moderate Sliding scale as needed  · Discharge later today or tomorrow      AZALEA Mckay, NP-C
thin   · Wound Type: None  · Current Nutrition Therapies:  · Oral Diet Orders: Clear Liquid   · Oral Diet intake: Unable to assess(No PO records)  · Oral Nutrition Supplement (ONS) Orders: None  · Anthropometric Measures:  · Ht: 5' 11\" (180.3 cm)   · Current Body Wt: 133 lb 11.2 oz (60.6 kg)  · Admission Body Wt: 125 lb (56.7 kg)  · Usual Body Wt: 125 lb (56.7 kg)  · % Weight Change:  ,  6.9% acute gains with hydration  · Ideal Body Wt: 172 lb (78 kg), % Ideal Body 78%  · BMI Classification: BMI 18.5 - 24.9 Normal Weight    Lab Results   Component Value Date     (L) 02/19/2020    K 4.1 02/19/2020    CL 96 (L) 02/19/2020    CO2 19 (L) 02/19/2020    BUN 24 (H) 02/19/2020    CREATININE 0.75 02/19/2020    GLUCOSE 230 (H) 02/19/2020    CALCIUM 8.3 (L) 02/19/2020    LABGLOM >60 02/19/2020    GFRAA >60 02/19/2020     No results found for: LABA1C  No results found for: EAG    Recent Labs     02/19/20  0001 02/19/20  0100 02/19/20  0159 02/19/20  0308 02/19/20  0401 02/19/20  0502 02/19/20  0602 02/19/20  0703   POCGLU 131* 157* 172* 193* 208* 204* 203* 180*     Nutrition Interventions:   Modify current diet(advance diet to 4 carb)  Continued Inpatient Monitoring, Coordination of Care, Education Initiated    Nutrition Evaluation:   · Evaluation: Goals set   · Goals: PO >75% meals on advanced diet     · Monitoring: Meal Intake, Pertinent Labs, Weight, I&O, Patient/Family Education    Electronically signed by Kamilah Peoples RD, CURT on 2/19/20 at 7:21 AM    Contact Number: [phone]

## 2020-02-20 NOTE — CONSULTS
Pt seen again this am.  He is still not feeling well. He c/o insomnia and stomach upset. Dr. Linnea Echeverria was just in and discussed with him. His insulin pump is going to be applied soon and see if the new settings that were recommended yesterday will achieve better glucose control. He again discusses this happens whenever he gets sick. Again discuss the importance of more frequent monitoring when ill and the importance of including ketone testing as this will alert him to seek medical attention and hopefully prevent DKA. He verbalizes understanding and reminded he was provided with written instructions and also reviewed these verbally. He does not have further questions at this time.

## 2020-02-23 LAB
CULTURE: NORMAL
CULTURE: NORMAL
Lab: NORMAL
Lab: NORMAL
SPECIMEN DESCRIPTION: NORMAL
SPECIMEN DESCRIPTION: NORMAL

## 2020-02-24 NOTE — DISCHARGE SUMMARY
Discharge Summary    Andrew Bangura  :  1960  MRN:  [id number]    Admit date:  2020      Discharge date:  2020     Admitting Physician:  Rosemarie Thomas MD    Discharge Diagnoses: Active Problems:    Type 2 diabetes mellitus treated with insulin ([facility])    Acidosis    BERNARDO (acute kidney injury) ([facility] [address] from DKA  Resolved Problems:    * No resolved hospital problems. *      Active Hospital Problems    Diagnosis Date Noted    BERNARDO (acute kidney injury) ([facility] [address] from DKA [N17.9] 2020    Type 2 diabetes mellitus [facility] [E11.9, Z79.4] 2020    Acidosis [E87.2] 2020       Discharge Medications:       [facility]   Home Medication Instructions BYV:[id number]    Printed on:20 1655   Medication Information                      aspirin 81 MG tablet  Take 81 mg by mouth daily             Insulin Aspart (NOVOLOG SC)  Inject into the skin Uses pump             lisinopril (PRINIVIL;ZESTRIL) 10 MG tablet  Take 10 mg by mouth daily             traZODone (DESYREL) 100 MG tablet  Take 0.5 tablets by mouth nightly as needed for Sleep                 Consultants:  none     Hospital Course:   Andrew Bangura is a 64 y.o. male admitted with  Diabetic ketoacidosis. Exam:   Regular. Clear lung sounds. No edema. Condition:   Fair    Disposition:   Home    Patient will be followed by No primary care provider on file.  in 1-2 weeks    Signed:  Rosemarie Thomas  2020, 4:55 PM

## 2023-06-21 ENCOUNTER — APPOINTMENT (OUTPATIENT)
Dept: GENERAL RADIOLOGY | Age: 63
End: 2023-06-21

## 2023-06-21 ENCOUNTER — HOSPITAL ENCOUNTER (EMERGENCY)
Age: 63
Discharge: HOME | End: 2023-06-21

## 2023-06-21 VITALS
RESPIRATION RATE: 16 BRPM | OXYGEN SATURATION: 97 % | DIASTOLIC BLOOD PRESSURE: 78 MMHG | SYSTOLIC BLOOD PRESSURE: 128 MMHG | TEMPERATURE: 97.8 F | HEART RATE: 70 BPM

## 2023-06-21 DIAGNOSIS — E11.621 DIABETIC ULCER OF TOE OF RIGHT FOOT ASSOCIATED WITH TYPE 2 DIABETES MELLITUS, LIMITED TO BREAKDOWN OF SKIN (HCC): Primary | ICD-10-CM

## 2023-06-21 DIAGNOSIS — L97.511 DIABETIC ULCER OF TOE OF RIGHT FOOT ASSOCIATED WITH TYPE 2 DIABETES MELLITUS, LIMITED TO BREAKDOWN OF SKIN (HCC): Primary | ICD-10-CM

## 2023-06-21 PROCEDURE — 6370000000 HC RX 637 (ALT 250 FOR IP): Performed by: PHYSICIAN ASSISTANT

## 2023-06-21 PROCEDURE — 99283 EMERGENCY DEPT VISIT LOW MDM: CPT

## 2023-06-21 PROCEDURE — 73660 X-RAY EXAM OF TOE(S): CPT

## 2023-06-21 RX ORDER — DOXYCYCLINE HYCLATE 100 MG/1
100 CAPSULE ORAL ONCE
Status: COMPLETED | OUTPATIENT
Start: 2023-06-21 | End: 2023-06-21

## 2023-06-21 RX ORDER — DOXYCYCLINE HYCLATE 100 MG/1
100 CAPSULE ORAL 2 TIMES DAILY
Qty: 20 CAPSULE | Refills: 0 | Status: SHIPPED | OUTPATIENT
Start: 2023-06-21 | End: 2023-07-01

## 2023-06-21 RX ADMIN — DOXYCYCLINE HYCLATE 100 MG: 100 CAPSULE ORAL at 11:39

## 2023-06-21 ASSESSMENT — ENCOUNTER SYMPTOMS
COUGH: 0
SHORTNESS OF BREATH: 0

## 2023-06-21 ASSESSMENT — PAIN - FUNCTIONAL ASSESSMENT
PAIN_FUNCTIONAL_ASSESSMENT: NONE - DENIES PAIN
PAIN_FUNCTIONAL_ASSESSMENT: NONE - DENIES PAIN

## 2023-06-21 NOTE — ED NOTES
Cleansed and washed right greater toe per Ludin HENDERSON, waiting for Xray to image toe then will wrap.      Hailey Palencia RN  06/21/23 6020

## 2023-06-21 NOTE — DISCHARGE INSTRUCTIONS
Cleanse and dress the ulcer daily. Begin taking the antibiotics. Schedule follow-up with podiatry for wound recheck.

## 2023-06-21 NOTE — ED PROVIDER NOTES
department after noticing a new wound to the base of his right great toe. The wound does not appear grossly infected although it would benefit from daily dressing changes and a course of antibiotics. I will have the patient also follow-up with podiatry to follow the wound healing. X-ray showed no obvious gas or osteomyelitis. Plan is discharge home and outpatient follow-up. FINAL IMPRESSION      1.  Diabetic ulcer of toe of right foot associated with type 2 diabetes mellitus, limited to breakdown of skin [facility])          DISPOSITION/PLAN     DISPOSITION Decision To Discharge 06/21/2023 11:57:53 AM      PATIENT REFERRED TO:  Emmie Baltazar DPM  [address] [phone]    Schedule an appointment as soon as possible for a visit   For wound re-check      DISCHARGE MEDICATIONS:  New Prescriptions    DOXYCYCLINE HYCLATE (VIBRAMYCIN) 100 MG CAPSULE    Take 1 capsule by mouth 2 times daily for 10 days       (Please note that portions of this note were completed with a voice recognition program.  Efforts were made to edit the dictations but occasionally words are mis-transcribed.)    Rene Mcbride PA-C (electronically signed)  Attending Emergency Physician           Rene Mcbride PA-C  06/21/23 5912

## 2023-09-01 ENCOUNTER — HOSPITAL ENCOUNTER (INPATIENT)
Age: 63
LOS: 2 days | Discharge: HOME | DRG: 420 | End: 2023-09-03
Attending: STUDENT IN AN ORGANIZED HEALTH CARE EDUCATION/TRAINING PROGRAM | Admitting: STUDENT IN AN ORGANIZED HEALTH CARE EDUCATION/TRAINING PROGRAM
Payer: COMMERCIAL

## 2023-09-01 DIAGNOSIS — G47.00 INSOMNIA, UNSPECIFIED TYPE: ICD-10-CM

## 2023-09-01 DIAGNOSIS — E10.10 TYPE 1 DIABETES MELLITUS WITH KETOACIDOSIS WITHOUT COMA (HCC): Primary | ICD-10-CM

## 2023-09-01 LAB
ANION GAP SERPL CALCULATED.3IONS-SCNC: 23 MMOL/L (ref 9–17)
ANION GAP SERPL CALCULATED.3IONS-SCNC: 33 MMOL/L (ref 9–17)
B-OH-BUTYR SERPL-MCNC: 11.74 MMOL/L (ref 0.02–0.27)
BASOPHILS # BLD: 0.11 K/UL (ref 0–0.2)
BASOPHILS NFR BLD: 1 % (ref 0–2)
BODY TEMPERATURE: 37
BUN SERPL-MCNC: 30 MG/DL (ref 8–23)
BUN SERPL-MCNC: 35 MG/DL (ref 8–23)
BUN/CREAT SERPL: 27 (ref 9–20)
BUN/CREAT SERPL: 27 (ref 9–20)
CALCIUM SERPL-MCNC: 8.5 MG/DL (ref 8.6–10.4)
CALCIUM SERPL-MCNC: 9.9 MG/DL (ref 8.6–10.4)
CHLORIDE SERPL-SCNC: 83 MMOL/L (ref 98–107)
CHLORIDE SERPL-SCNC: 98 MMOL/L (ref 98–107)
CHP ED QC CHECK: YES
CO2 SERPL-SCNC: 10 MMOL/L (ref 20–31)
CO2 SERPL-SCNC: 11 MMOL/L (ref 20–31)
CREAT SERPL-MCNC: 1.1 MG/DL (ref 0.7–1.2)
CREAT SERPL-MCNC: 1.3 MG/DL (ref 0.7–1.2)
EOSINOPHIL # BLD: <0.03 K/UL (ref 0–0.44)
EOSINOPHILS RELATIVE PERCENT: 0 % (ref 1–4)
ERYTHROCYTE [DISTWIDTH] IN BLOOD BY AUTOMATED COUNT: 12.4 % (ref 11.8–14.4)
GFR SERPL CREATININE-BSD FRML MDRD: >60 ML/MIN/1.73M2
GFR SERPL CREATININE-BSD FRML MDRD: >60 ML/MIN/1.73M2
GLUCOSE BLD-MCNC: 311 MG/DL (ref 74–100)
GLUCOSE BLD-MCNC: 384 MG/DL (ref 74–100)
GLUCOSE BLD-MCNC: 498 MG/DL (ref 74–100)
GLUCOSE BLD-MCNC: 521 MG/DL (ref 74–100)
GLUCOSE BLD-MCNC: 556 MG/DL
GLUCOSE BLD-MCNC: 556 MG/DL (ref 74–100)
GLUCOSE SERPL-MCNC: 325 MG/DL (ref 70–99)
GLUCOSE SERPL-MCNC: 608 MG/DL (ref 70–99)
HCO3 VENOUS: 9.4 MMOL/L (ref 24–30)
HCT VFR BLD AUTO: 40.8 % (ref 40.7–50.3)
HGB BLD-MCNC: 13.3 G/DL (ref 13–17)
IMM GRANULOCYTES # BLD AUTO: 0.06 K/UL (ref 0–0.3)
IMM GRANULOCYTES NFR BLD: 1 %
LYMPHOCYTES NFR BLD: 1.83 K/UL (ref 1.1–3.7)
LYMPHOCYTES RELATIVE PERCENT: 21 % (ref 24–43)
MAGNESIUM SERPL-MCNC: 2 MG/DL (ref 1.6–2.6)
MAGNESIUM SERPL-MCNC: 2.3 MG/DL (ref 1.6–2.6)
MCH RBC QN AUTO: 32 PG (ref 25.2–33.5)
MCHC RBC AUTO-ENTMCNC: 32.6 G/DL (ref 28.4–34.8)
MCV RBC AUTO: 98.3 FL (ref 82.6–102.9)
MONOCYTES NFR BLD: 0.5 K/UL (ref 0.1–1.2)
MONOCYTES NFR BLD: 6 % (ref 3–12)
NEGATIVE BASE EXCESS, VEN: 18.6 MMOL/L (ref 0–2)
NEUTROPHILS NFR BLD: 71 % (ref 36–65)
NEUTS SEG NFR BLD: 6.25 K/UL (ref 1.5–8.1)
NRBC BLD-RTO: 0 PER 100 WBC
O2 SAT, VEN: 98.9 % (ref 60–85)
PCO2, VEN: 29.5 MM HG (ref 39–55)
PH VENOUS: 7.12 (ref 7.32–7.42)
PHOSPHATE SERPL-MCNC: 3 MG/DL (ref 2.5–4.5)
PLATELET # BLD AUTO: 298 K/UL (ref 138–453)
PMV BLD AUTO: 10.2 FL (ref 8.1–13.5)
PO2, VEN: 189.3 MM HG (ref 30–50)
POTASSIUM SERPL-SCNC: 4.2 MMOL/L (ref 3.7–5.3)
POTASSIUM SERPL-SCNC: 4.9 MMOL/L (ref 3.7–5.3)
RBC # BLD AUTO: 4.15 M/UL (ref 4.21–5.77)
SODIUM SERPL-SCNC: 126 MMOL/L (ref 135–144)
SODIUM SERPL-SCNC: 132 MMOL/L (ref 135–144)
WBC OTHER # BLD: 8.8 K/UL (ref 3.5–11.3)

## 2023-09-01 PROCEDURE — 80048 BASIC METABOLIC PNL TOTAL CA: CPT

## 2023-09-01 PROCEDURE — 2580000003 HC RX 258: Performed by: STUDENT IN AN ORGANIZED HEALTH CARE EDUCATION/TRAINING PROGRAM

## 2023-09-01 PROCEDURE — 96374 THER/PROPH/DIAG INJ IV PUSH: CPT

## 2023-09-01 PROCEDURE — 6360000002 HC RX W HCPCS: Performed by: STUDENT IN AN ORGANIZED HEALTH CARE EDUCATION/TRAINING PROGRAM

## 2023-09-01 PROCEDURE — A4216 STERILE WATER/SALINE, 10 ML: HCPCS | Performed by: STUDENT IN AN ORGANIZED HEALTH CARE EDUCATION/TRAINING PROGRAM

## 2023-09-01 PROCEDURE — 84100 ASSAY OF PHOSPHORUS: CPT

## 2023-09-01 PROCEDURE — 83036 HEMOGLOBIN GLYCOSYLATED A1C: CPT

## 2023-09-01 PROCEDURE — 36415 COLL VENOUS BLD VENIPUNCTURE: CPT

## 2023-09-01 PROCEDURE — 94761 N-INVAS EAR/PLS OXIMETRY MLT: CPT

## 2023-09-01 PROCEDURE — 82947 ASSAY GLUCOSE BLOOD QUANT: CPT

## 2023-09-01 PROCEDURE — 82805 BLOOD GASES W/O2 SATURATION: CPT

## 2023-09-01 PROCEDURE — 99285 EMERGENCY DEPT VISIT HI MDM: CPT

## 2023-09-01 PROCEDURE — 96375 TX/PRO/DX INJ NEW DRUG ADDON: CPT

## 2023-09-01 PROCEDURE — 83735 ASSAY OF MAGNESIUM: CPT

## 2023-09-01 PROCEDURE — 96361 HYDRATE IV INFUSION ADD-ON: CPT

## 2023-09-01 PROCEDURE — 2500000003 HC RX 250 WO HCPCS: Performed by: STUDENT IN AN ORGANIZED HEALTH CARE EDUCATION/TRAINING PROGRAM

## 2023-09-01 PROCEDURE — 2000000000 HC ICU R&B

## 2023-09-01 PROCEDURE — 82010 KETONE BODYS QUAN: CPT

## 2023-09-01 PROCEDURE — 85025 COMPLETE CBC W/AUTO DIFF WBC: CPT

## 2023-09-01 PROCEDURE — 6370000000 HC RX 637 (ALT 250 FOR IP): Performed by: STUDENT IN AN ORGANIZED HEALTH CARE EDUCATION/TRAINING PROGRAM

## 2023-09-01 PROCEDURE — 2580000003 HC RX 258

## 2023-09-01 RX ORDER — POLYETHYLENE GLYCOL 3350 17 G/17G
17 POWDER, FOR SOLUTION ORAL DAILY PRN
Status: DISCONTINUED | OUTPATIENT
Start: 2023-09-01 | End: 2023-09-03 | Stop reason: HOSPADM

## 2023-09-01 RX ORDER — INSULIN LISPRO 100 [IU]/ML
10 INJECTION, SOLUTION INTRAVENOUS; SUBCUTANEOUS ONCE
Status: COMPLETED | OUTPATIENT
Start: 2023-09-01 | End: 2023-09-01

## 2023-09-01 RX ORDER — DEXTROSE AND SODIUM CHLORIDE 5; .45 G/100ML; G/100ML
INJECTION, SOLUTION INTRAVENOUS CONTINUOUS PRN
Status: DISCONTINUED | OUTPATIENT
Start: 2023-09-01 | End: 2023-09-02

## 2023-09-01 RX ORDER — 0.9 % SODIUM CHLORIDE 0.9 %
15 INTRAVENOUS SOLUTION INTRAVENOUS ONCE
Status: COMPLETED | OUTPATIENT
Start: 2023-09-01 | End: 2023-09-01

## 2023-09-01 RX ORDER — SODIUM CHLORIDE 9 MG/ML
INJECTION, SOLUTION INTRAVENOUS CONTINUOUS
Status: DISCONTINUED | OUTPATIENT
Start: 2023-09-01 | End: 2023-09-02

## 2023-09-01 RX ORDER — 0.9 % SODIUM CHLORIDE 0.9 %
1000 INTRAVENOUS SOLUTION INTRAVENOUS ONCE
Status: COMPLETED | OUTPATIENT
Start: 2023-09-01 | End: 2023-09-01

## 2023-09-01 RX ORDER — ENOXAPARIN SODIUM 100 MG/ML
40 INJECTION SUBCUTANEOUS DAILY
Status: DISCONTINUED | OUTPATIENT
Start: 2023-09-02 | End: 2023-09-03 | Stop reason: HOSPADM

## 2023-09-01 RX ORDER — MAGNESIUM SULFATE IN WATER 40 MG/ML
2000 INJECTION, SOLUTION INTRAVENOUS PRN
Status: DISCONTINUED | OUTPATIENT
Start: 2023-09-01 | End: 2023-09-03 | Stop reason: HOSPADM

## 2023-09-01 RX ORDER — SODIUM CHLORIDE 9 MG/ML
INJECTION, SOLUTION INTRAVENOUS
Status: COMPLETED
Start: 2023-09-01 | End: 2023-09-01

## 2023-09-01 RX ORDER — ASPIRIN 81 MG/1
81 TABLET, CHEWABLE ORAL DAILY
Status: DISCONTINUED | OUTPATIENT
Start: 2023-09-02 | End: 2023-09-03 | Stop reason: HOSPADM

## 2023-09-01 RX ORDER — DEXTROSE MONOHYDRATE 100 MG/ML
INJECTION, SOLUTION INTRAVENOUS CONTINUOUS PRN
Status: DISCONTINUED | OUTPATIENT
Start: 2023-09-01 | End: 2023-09-03 | Stop reason: HOSPADM

## 2023-09-01 RX ORDER — MAGNESIUM SULFATE 1 G/100ML
1000 INJECTION INTRAVENOUS PRN
Status: DISCONTINUED | OUTPATIENT
Start: 2023-09-01 | End: 2023-09-03 | Stop reason: HOSPADM

## 2023-09-01 RX ORDER — POTASSIUM CHLORIDE 7.45 MG/ML
10 INJECTION INTRAVENOUS PRN
Status: DISCONTINUED | OUTPATIENT
Start: 2023-09-01 | End: 2023-09-03 | Stop reason: HOSPADM

## 2023-09-01 RX ORDER — ONDANSETRON 2 MG/ML
4 INJECTION INTRAMUSCULAR; INTRAVENOUS ONCE
Status: COMPLETED | OUTPATIENT
Start: 2023-09-01 | End: 2023-09-01

## 2023-09-01 RX ORDER — TRAZODONE HYDROCHLORIDE 50 MG/1
50 TABLET ORAL NIGHTLY PRN
Status: DISCONTINUED | OUTPATIENT
Start: 2023-09-01 | End: 2023-09-03 | Stop reason: HOSPADM

## 2023-09-01 RX ADMIN — SODIUM CHLORIDE 1000 ML: 9 INJECTION, SOLUTION INTRAVENOUS at 19:25

## 2023-09-01 RX ADMIN — SODIUM CHLORIDE: 9 INJECTION, SOLUTION INTRAVENOUS at 21:25

## 2023-09-01 RX ADMIN — FAMOTIDINE 20 MG: 10 INJECTION, SOLUTION INTRAVENOUS at 19:30

## 2023-09-01 RX ADMIN — SODIUM CHLORIDE 9.22 UNITS/HR: 9 INJECTION, SOLUTION INTRAVENOUS at 21:00

## 2023-09-01 RX ADMIN — INSULIN LISPRO 10 UNITS: 100 INJECTION, SOLUTION INTRAVENOUS; SUBCUTANEOUS at 20:32

## 2023-09-01 RX ADMIN — SODIUM CHLORIDE 899 ML: 9 INJECTION, SOLUTION INTRAVENOUS at 20:30

## 2023-09-01 RX ADMIN — ONDANSETRON 4 MG: 2 INJECTION INTRAMUSCULAR; INTRAVENOUS at 19:30

## 2023-09-01 ASSESSMENT — ENCOUNTER SYMPTOMS
VOMITING: 1
ABDOMINAL PAIN: 0
RESPIRATORY NEGATIVE: 1
NAUSEA: 1

## 2023-09-01 NOTE — ED PROVIDER NOTES
[facility] ED  Emergency Department Encounter  Emergency Medicine Resident     Pt Juancarlos Davis Board  MRN: [id number]  [address] [facility] [place] 1960  Date of evaluation: 9/1/23  PCP:  No primary care provider on file. Note Started: 7:12 PM EDT      CHIEF COMPLAINT       Chief Complaint   Patient presents with    Hyperglycemia     FSBS at home 800    Emesis    Nausea       HISTORY OF PRESENT ILLNESS  (Location/Symptom, Timing/Onset, Context/Setting, Quality, Duration, Modifying Factors, Severity.)      Tennille Lowe is a 63 y.o. male who presents with hyperglycemia, nausea and emesis. Patient checked his blood sugar and found it to be over 700. Patient has a insulin pump that was changed yesterday to of his dose. Both patient and his significant other believe that he may be malfunctioning or no longer working. Patient states that he has had nausea and emesis all day and was able to take his blood sugar and saw steadily rising. Patient states he has had diabetic ketoacidosis before and feels very similar to the prior times. PAST MEDICAL / SURGICAL / SOCIAL / FAMILY HISTORY      has a past medical history of Diabetes mellitus ([address]) and Hypertension. has no past surgical history on file.       Social History     Socioeconomic History    Marital status: Single     Spouse name: Not on file    Number of children: Not on file    Years of education: Not on file    Highest education level: Not on file   Occupational History    Not on file   Tobacco Use    Smoking status: Every Day     Packs/day: 1.00     Types: Cigarettes    Smokeless tobacco: Never   Substance and Sexual Activity    Alcohol use: Yes     Comment: social    Drug use: Never    Sexual activity: Not on file   Other Topics Concern    Not on file   Social History Narrative    Not on file     Social Determinants of Health     Financial Resource Strain: Not on file   Food Insecurity: Not on file   Transportation Needs: Not on file   Physical Activity:

## 2023-09-02 PROBLEM — R11.2 NAUSEA & VOMITING: Status: ACTIVE | Noted: 2023-09-02

## 2023-09-02 PROBLEM — I10 HTN (HYPERTENSION): Status: ACTIVE | Noted: 2023-09-02

## 2023-09-02 LAB
ANION GAP SERPL CALCULATED.3IONS-SCNC: 10 MMOL/L (ref 9–17)
ANION GAP SERPL CALCULATED.3IONS-SCNC: 12 MMOL/L (ref 9–17)
ANION GAP SERPL CALCULATED.3IONS-SCNC: 13 MMOL/L (ref 9–17)
ANION GAP SERPL CALCULATED.3IONS-SCNC: 15 MMOL/L (ref 9–17)
BASOPHILS # BLD: 0.06 K/UL (ref 0–0.2)
BASOPHILS NFR BLD: 1 % (ref 0–2)
BUN SERPL-MCNC: 19 MG/DL (ref 8–23)
BUN SERPL-MCNC: 21 MG/DL (ref 8–23)
BUN SERPL-MCNC: 22 MG/DL (ref 8–23)
BUN SERPL-MCNC: 25 MG/DL (ref 8–23)
BUN/CREAT SERPL: 23 (ref 9–20)
BUN/CREAT SERPL: 24 (ref 9–20)
BUN/CREAT SERPL: 28 (ref 9–20)
BUN/CREAT SERPL: 28 (ref 9–20)
CALCIUM SERPL-MCNC: 6 MG/DL (ref 8.6–10.4)
CALCIUM SERPL-MCNC: 8.1 MG/DL (ref 8.6–10.4)
CALCIUM SERPL-MCNC: 8.3 MG/DL (ref 8.6–10.4)
CALCIUM SERPL-MCNC: 8.3 MG/DL (ref 8.6–10.4)
CHLORIDE SERPL-SCNC: 105 MMOL/L (ref 98–107)
CHLORIDE SERPL-SCNC: 106 MMOL/L (ref 98–107)
CO2 SERPL-SCNC: 13 MMOL/L (ref 20–31)
CO2 SERPL-SCNC: 17 MMOL/L (ref 20–31)
CO2 SERPL-SCNC: 19 MMOL/L (ref 20–31)
CO2 SERPL-SCNC: 20 MMOL/L (ref 20–31)
CREAT SERPL-MCNC: 0.8 MG/DL (ref 0.7–1.2)
CREAT SERPL-MCNC: 0.8 MG/DL (ref 0.7–1.2)
CREAT SERPL-MCNC: 0.9 MG/DL (ref 0.7–1.2)
CREAT SERPL-MCNC: 0.9 MG/DL (ref 0.7–1.2)
EOSINOPHIL # BLD: <0.03 K/UL (ref 0–0.44)
EOSINOPHILS RELATIVE PERCENT: 0 % (ref 1–4)
ERYTHROCYTE [DISTWIDTH] IN BLOOD BY AUTOMATED COUNT: 12.1 % (ref 11.8–14.4)
GFR SERPL CREATININE-BSD FRML MDRD: >60 ML/MIN/1.73M2
GLUCOSE BLD-MCNC: 141 MG/DL (ref 74–100)
GLUCOSE BLD-MCNC: 144 MG/DL (ref 74–100)
GLUCOSE BLD-MCNC: 164 MG/DL (ref 74–100)
GLUCOSE BLD-MCNC: 189 MG/DL (ref 74–100)
GLUCOSE BLD-MCNC: 194 MG/DL (ref 74–100)
GLUCOSE BLD-MCNC: 201 MG/DL (ref 74–100)
GLUCOSE BLD-MCNC: 224 MG/DL (ref 74–100)
GLUCOSE BLD-MCNC: 228 MG/DL (ref 74–100)
GLUCOSE BLD-MCNC: 232 MG/DL (ref 74–100)
GLUCOSE BLD-MCNC: 233 MG/DL (ref 74–100)
GLUCOSE BLD-MCNC: 237 MG/DL (ref 74–100)
GLUCOSE BLD-MCNC: 250 MG/DL (ref 74–100)
GLUCOSE BLD-MCNC: 251 MG/DL (ref 74–100)
GLUCOSE BLD-MCNC: 69 MG/DL (ref 74–100)
GLUCOSE BLD-MCNC: 88 MG/DL (ref 74–100)
GLUCOSE SERPL-MCNC: 193 MG/DL (ref 70–99)
GLUCOSE SERPL-MCNC: 257 MG/DL (ref 70–99)
GLUCOSE SERPL-MCNC: 339 MG/DL (ref 70–99)
GLUCOSE SERPL-MCNC: 73 MG/DL (ref 70–99)
HCT VFR BLD AUTO: 33.9 % (ref 40.7–50.3)
HGB BLD-MCNC: 11.6 G/DL (ref 13–17)
IMM GRANULOCYTES # BLD AUTO: 0.04 K/UL (ref 0–0.3)
IMM GRANULOCYTES NFR BLD: 0 %
LYMPHOCYTES NFR BLD: 1.26 K/UL (ref 1.1–3.7)
LYMPHOCYTES RELATIVE PERCENT: 11 % (ref 24–43)
MAGNESIUM SERPL-MCNC: 1.8 MG/DL (ref 1.6–2.6)
MCH RBC QN AUTO: 32.5 PG (ref 25.2–33.5)
MCHC RBC AUTO-ENTMCNC: 34.2 G/DL (ref 28.4–34.8)
MCV RBC AUTO: 95 FL (ref 82.6–102.9)
MONOCYTES NFR BLD: 0.33 K/UL (ref 0.1–1.2)
MONOCYTES NFR BLD: 3 % (ref 3–12)
NEUTROPHILS NFR BLD: 85 % (ref 36–65)
NEUTS SEG NFR BLD: 9.61 K/UL (ref 1.5–8.1)
NRBC BLD-RTO: 0 PER 100 WBC
PHOSPHATE SERPL-MCNC: 1.3 MG/DL (ref 2.5–4.5)
PHOSPHATE SERPL-MCNC: 1.7 MG/DL (ref 2.5–4.5)
PHOSPHATE SERPL-MCNC: 2.4 MG/DL (ref 2.5–4.5)
PHOSPHATE SERPL-MCNC: 3.1 MG/DL (ref 2.5–4.5)
PLATELET # BLD AUTO: 257 K/UL (ref 138–453)
PMV BLD AUTO: 9.9 FL (ref 8.1–13.5)
POTASSIUM SERPL-SCNC: 3.5 MMOL/L (ref 3.7–5.3)
POTASSIUM SERPL-SCNC: 3.5 MMOL/L (ref 3.7–5.3)
POTASSIUM SERPL-SCNC: 3.7 MMOL/L (ref 3.7–5.3)
POTASSIUM SERPL-SCNC: 3.9 MMOL/L (ref 3.7–5.3)
RBC # BLD AUTO: 3.57 M/UL (ref 4.21–5.77)
SODIUM SERPL-SCNC: 133 MMOL/L (ref 135–144)
SODIUM SERPL-SCNC: 135 MMOL/L (ref 135–144)
SODIUM SERPL-SCNC: 135 MMOL/L (ref 135–144)
SODIUM SERPL-SCNC: 137 MMOL/L (ref 135–144)
WBC OTHER # BLD: 11.3 K/UL (ref 3.5–11.3)

## 2023-09-02 PROCEDURE — 36415 COLL VENOUS BLD VENIPUNCTURE: CPT

## 2023-09-02 PROCEDURE — 6360000002 HC RX W HCPCS: Performed by: STUDENT IN AN ORGANIZED HEALTH CARE EDUCATION/TRAINING PROGRAM

## 2023-09-02 PROCEDURE — 94761 N-INVAS EAR/PLS OXIMETRY MLT: CPT

## 2023-09-02 PROCEDURE — 83735 ASSAY OF MAGNESIUM: CPT

## 2023-09-02 PROCEDURE — 80048 BASIC METABOLIC PNL TOTAL CA: CPT

## 2023-09-02 PROCEDURE — 2500000003 HC RX 250 WO HCPCS: Performed by: STUDENT IN AN ORGANIZED HEALTH CARE EDUCATION/TRAINING PROGRAM

## 2023-09-02 PROCEDURE — 2580000003 HC RX 258: Performed by: STUDENT IN AN ORGANIZED HEALTH CARE EDUCATION/TRAINING PROGRAM

## 2023-09-02 PROCEDURE — 82947 ASSAY GLUCOSE BLOOD QUANT: CPT

## 2023-09-02 PROCEDURE — 84100 ASSAY OF PHOSPHORUS: CPT

## 2023-09-02 PROCEDURE — 2000000000 HC ICU R&B

## 2023-09-02 PROCEDURE — 6370000000 HC RX 637 (ALT 250 FOR IP): Performed by: STUDENT IN AN ORGANIZED HEALTH CARE EDUCATION/TRAINING PROGRAM

## 2023-09-02 PROCEDURE — 85025 COMPLETE CBC W/AUTO DIFF WBC: CPT

## 2023-09-02 PROCEDURE — 83036 HEMOGLOBIN GLYCOSYLATED A1C: CPT

## 2023-09-02 RX ORDER — ACETAMINOPHEN 325 MG/1
650 TABLET ORAL EVERY 4 HOURS PRN
Status: DISCONTINUED | OUTPATIENT
Start: 2023-09-02 | End: 2023-09-03 | Stop reason: HOSPADM

## 2023-09-02 RX ORDER — CALCIUM GLUCONATE 10 MG/ML
1000 INJECTION, SOLUTION INTRAVENOUS
Status: COMPLETED | OUTPATIENT
Start: 2023-09-02 | End: 2023-09-03

## 2023-09-02 RX ORDER — SODIUM PHOSPHATE, MONOBASIC, MONOHYDRATE 276; 142 MG/ML; MG/ML
INJECTION, SOLUTION INTRAVENOUS
Status: DISPENSED
Start: 2023-09-02 | End: 2023-09-02

## 2023-09-02 RX ORDER — ONDANSETRON 4 MG/1
4 TABLET, ORALLY DISINTEGRATING ORAL EVERY 8 HOURS PRN
Status: DISCONTINUED | OUTPATIENT
Start: 2023-09-02 | End: 2023-09-03 | Stop reason: HOSPADM

## 2023-09-02 RX ORDER — INSULIN LISPRO 100 [IU]/ML
0-16 INJECTION, SOLUTION INTRAVENOUS; SUBCUTANEOUS EVERY 4 HOURS
Status: DISCONTINUED | OUTPATIENT
Start: 2023-09-02 | End: 2023-09-03 | Stop reason: HOSPADM

## 2023-09-02 RX ORDER — ONDANSETRON 2 MG/ML
4 INJECTION INTRAMUSCULAR; INTRAVENOUS EVERY 6 HOURS PRN
Status: DISCONTINUED | OUTPATIENT
Start: 2023-09-02 | End: 2023-09-03 | Stop reason: HOSPADM

## 2023-09-02 RX ORDER — ZOLPIDEM TARTRATE 5 MG/1
5 TABLET ORAL NIGHTLY PRN
Status: DISCONTINUED | OUTPATIENT
Start: 2023-09-02 | End: 2023-09-03

## 2023-09-02 RX ORDER — SODIUM CHLORIDE 450 MG/100ML
INJECTION, SOLUTION INTRAVENOUS ONCE
Status: COMPLETED | OUTPATIENT
Start: 2023-09-02 | End: 2023-09-02

## 2023-09-02 RX ORDER — ONDANSETRON 2 MG/ML
4 INJECTION INTRAMUSCULAR; INTRAVENOUS EVERY 6 HOURS PRN
Status: DISCONTINUED | OUTPATIENT
Start: 2023-09-02 | End: 2023-09-02 | Stop reason: SDUPTHER

## 2023-09-02 RX ORDER — ALPRAZOLAM 0.25 MG/1
0.25 TABLET ORAL ONCE
Status: COMPLETED | OUTPATIENT
Start: 2023-09-02 | End: 2023-09-02

## 2023-09-02 RX ORDER — INSULIN GLARGINE 100 [IU]/ML
15 INJECTION, SOLUTION SUBCUTANEOUS NIGHTLY
Status: DISCONTINUED | OUTPATIENT
Start: 2023-09-02 | End: 2023-09-03 | Stop reason: HOSPADM

## 2023-09-02 RX ADMIN — SODIUM CHLORIDE: 4.5 INJECTION, SOLUTION INTRAVENOUS at 10:30

## 2023-09-02 RX ADMIN — DEXTROSE MONOHYDRATE 125 ML: 100 INJECTION, SOLUTION INTRAVENOUS at 13:04

## 2023-09-02 RX ADMIN — CALCIUM GLUCONATE 1000 MG: 10 INJECTION, SOLUTION INTRAVENOUS at 21:55

## 2023-09-02 RX ADMIN — ALPRAZOLAM 0.25 MG: 0.25 TABLET ORAL at 08:31

## 2023-09-02 RX ADMIN — ZOLPIDEM TARTRATE 5 MG: 5 TABLET ORAL at 20:31

## 2023-09-02 RX ADMIN — ASPIRIN 81 MG: 81 TABLET, CHEWABLE ORAL at 08:30

## 2023-09-02 RX ADMIN — TRAZODONE HYDROCHLORIDE 50 MG: 50 TABLET ORAL at 02:16

## 2023-09-02 RX ADMIN — POTASSIUM CHLORIDE 10 MEQ: 7.46 INJECTION, SOLUTION INTRAVENOUS at 08:39

## 2023-09-02 RX ADMIN — DEXTROSE AND SODIUM CHLORIDE: 5; 450 INJECTION, SOLUTION INTRAVENOUS at 00:07

## 2023-09-02 RX ADMIN — ONDANSETRON 4 MG: 2 INJECTION INTRAMUSCULAR; INTRAVENOUS at 13:27

## 2023-09-02 RX ADMIN — INSULIN LISPRO 12 UNITS: 100 INJECTION, SOLUTION INTRAVENOUS; SUBCUTANEOUS at 20:20

## 2023-09-02 RX ADMIN — CALCIUM GLUCONATE 1000 MG: 10 INJECTION, SOLUTION INTRAVENOUS at 20:53

## 2023-09-02 RX ADMIN — POTASSIUM CHLORIDE 10 MEQ: 7.46 INJECTION, SOLUTION INTRAVENOUS at 05:37

## 2023-09-02 RX ADMIN — INSULIN GLARGINE 15 UNITS: 100 INJECTION, SOLUTION SUBCUTANEOUS at 20:25

## 2023-09-02 RX ADMIN — ENOXAPARIN SODIUM 40 MG: 100 INJECTION SUBCUTANEOUS at 08:31

## 2023-09-02 RX ADMIN — INSULIN LISPRO 4 UNITS: 100 INJECTION, SOLUTION INTRAVENOUS; SUBCUTANEOUS at 23:58

## 2023-09-02 RX ADMIN — SODIUM PHOSPHATE, MONOBASIC, MONOHYDRATE 20 MMOL: 276; 142 INJECTION, SOLUTION INTRAVENOUS at 05:40

## 2023-09-02 RX ADMIN — TRAZODONE HYDROCHLORIDE 50 MG: 50 TABLET ORAL at 20:31

## 2023-09-02 RX ADMIN — POTASSIUM PHOSPHATE, MONOBASIC POTASSIUM PHOSPHATE, DIBASIC 20 MMOL: 224; 236 INJECTION, SOLUTION, CONCENTRATE INTRAVENOUS at 14:32

## 2023-09-02 RX ADMIN — CALCIUM GLUCONATE 1000 MG: 10 INJECTION, SOLUTION INTRAVENOUS at 22:55

## 2023-09-02 RX ADMIN — DEXTROSE AND SODIUM CHLORIDE: 5; 450 INJECTION, SOLUTION INTRAVENOUS at 07:11

## 2023-09-02 RX ADMIN — SODIUM CHLORIDE 4.4 UNITS/HR: 9 INJECTION, SOLUTION INTRAVENOUS at 10:29

## 2023-09-02 ASSESSMENT — PAIN DESCRIPTION - ORIENTATION: ORIENTATION: OTHER (COMMENT)

## 2023-09-02 ASSESSMENT — PAIN DESCRIPTION - LOCATION: LOCATION: HEAD

## 2023-09-02 ASSESSMENT — PAIN DESCRIPTION - DESCRIPTORS: DESCRIPTORS: DISCOMFORT;DULL;GNAWING

## 2023-09-02 ASSESSMENT — PAIN - FUNCTIONAL ASSESSMENT: PAIN_FUNCTIONAL_ASSESSMENT: ACTIVITIES ARE NOT PREVENTED

## 2023-09-02 ASSESSMENT — PAIN SCALES - GENERAL: PAINLEVEL_OUTOF10: 3

## 2023-09-02 NOTE — H&P
[facility]  [address], [place], [address]    History & Physical Examination Note              Date:   9/2/2023  Patient name:  Josue Landers  Date of admission:  9/1/2023  7:05 PM  MRN:   [id number]  YOB: 1960    CHIEF COMPLAINT:       Chief Complaint   Patient presents with    Hyperglycemia     FSBS at home 800    Emesis    Nausea       History Obtained From:  Patient and chart review. HPI:     The patient is a 63 y.o.  male who presented with concerns for nausea and vomiting. He had several episodes prior to arrival. He has diabetes and has an insulin pump that has been working well for him in the past. He has been using one for about 15 years and has been using the current pump for 3-4 years. He recently moved from [facility]. The patient checked his blood glucose at home and it was found to be very elevated in the 600+ range at home. He believes that the pump did not work. He did not have any other additional /back-up insulin to provide himself at the time. The had prior episodes of DKA and this felt very similar. No chest pain, pressure, discomfort. He has been having troubles sleeping as well. He had been tolerating his PO intake well prior to this episode. No known sick contacts. In the ED, labs and imaging were obtained and were reviewed. The case was discussed with the ED Provider prior to admission. PAST MEDICAL HISTORY:        has a past medical history of Diabetes mellitus ([address]) and Hypertension. Diagnosis Date    Diabetes mellitus ([address])     Hypertension        PAST SURGICAL HISTORY:       History reviewed. No pertinent surgical history. Reviewed and non-contributory or as noted above and/or in the HPI    FAMILY HISTORY:     History reviewed. No pertinent family history. Reviewed and non-contributory or as noted above and/or in the HPI    HOME MEDICATIONS:     Prior to Admission medications    Medication Sig Start Date End Date Taking?  Authorizing Provider

## 2023-09-02 NOTE — PROGRESS NOTES
Comprehensive Nutrition Assessment    Type and Reason for Visit:  Initial, Consult, Patient Education, Positive Nutrition Screen (MST 1)    Nutrition Recommendations/Plan:   Continue NPO diet. Progress to CC 4 carbs per meal diet. Malnutrition Assessment:  Malnutrition Status:  Insufficient data (09/02/23 1464)    Context:  Acute Illness     Findings of the 6 clinical characteristics of malnutrition:  Energy Intake:  Unable to assess  Weight Loss:  Unable to assess     Body Fat Loss:  No significant body fat loss     Muscle Mass Loss:  No significant muscle mass loss    Fluid Accumulation:  No significant fluid accumulation     Strength:  Not Performed    Nutrition Assessment:    Altered nutrition related labs r/t endocrine dysfunction aeb glucose 228 (was 608), A1c 10.2. Pt admitted with DKA. Pt states he does not feel like talking at this time. f/u with diet education needs. Nutrition Related Findings:    appears well nourished Wound Type: None       Current Nutrition Intake & Therapies:    Average Meal Intake: NPO  Average Supplements Intake: NPO  Diet NPO    Anthropometric Measures:  Height: 5' 11\" (180.3 cm)  Ideal Body Weight (IBW): 172 lbs (78 kg)    Admission Body Weight: 130 lb 11.2 oz (59.3 kg)  Current Body Weight: 130 lb 11.2 oz (59.3 kg), 76 % IBW.  Weight Source: Bed Scale  Current BMI (kg/m2): 18.2  Usual Body Weight: 132 lb (59.9 kg)  % Weight Change (Calculated): -1  Weight Adjustment For: No Adjustment                 BMI Categories: Underweight (BMI less than 18.5)    Estimated Daily Nutrient Needs:  Energy Requirements Based On: Kcal/kg  Weight Used for Energy Requirements: Current  Energy (kcal/day): 9025-7264 (30-33/kg)  Weight Used for Protein Requirements: Current  Protein (g/day): 77-94g (1.3-1.5g/kg)  Method Used for Fluid Requirements: 1 ml/kcal  Fluid (ml/day): 1,800 ml  Hematology:  Recent Labs     09/01/23  1916 09/02/23  0400   WBC 8.8 11.3   HGB 13.3 11.6*   HCT 40.8

## 2023-09-02 NOTE — PROGRESS NOTES
Writer attempted to instruct pt on incentive spirometer but RN requested to leave pt sleep at this time. Will check back at another time.

## 2023-09-03 VITALS
OXYGEN SATURATION: 96 % | WEIGHT: 139 LBS | TEMPERATURE: 97.4 F | RESPIRATION RATE: 17 BRPM | HEART RATE: 74 BPM | DIASTOLIC BLOOD PRESSURE: 86 MMHG | BODY MASS INDEX: 19.46 KG/M2 | SYSTOLIC BLOOD PRESSURE: 158 MMHG | HEIGHT: 71 IN

## 2023-09-03 LAB
ANION GAP SERPL CALCULATED.3IONS-SCNC: 10 MMOL/L (ref 9–17)
ANION GAP SERPL CALCULATED.3IONS-SCNC: 10 MMOL/L (ref 9–17)
BASOPHILS # BLD: 0.06 K/UL (ref 0–0.2)
BASOPHILS NFR BLD: 1 % (ref 0–2)
BUN SERPL-MCNC: 17 MG/DL (ref 8–23)
BUN SERPL-MCNC: 17 MG/DL (ref 8–23)
BUN/CREAT SERPL: 21 (ref 9–20)
BUN/CREAT SERPL: 21 (ref 9–20)
CALCIUM SERPL-MCNC: 7.7 MG/DL (ref 8.6–10.4)
CALCIUM SERPL-MCNC: 8.7 MG/DL (ref 8.6–10.4)
CHLORIDE SERPL-SCNC: 106 MMOL/L (ref 98–107)
CHLORIDE SERPL-SCNC: 106 MMOL/L (ref 98–107)
CO2 SERPL-SCNC: 17 MMOL/L (ref 20–31)
CO2 SERPL-SCNC: 21 MMOL/L (ref 20–31)
CREAT SERPL-MCNC: 0.8 MG/DL (ref 0.7–1.2)
CREAT SERPL-MCNC: 0.8 MG/DL (ref 0.7–1.2)
EOSINOPHIL # BLD: <0.03 K/UL (ref 0–0.44)
EOSINOPHILS RELATIVE PERCENT: 0 % (ref 1–4)
ERYTHROCYTE [DISTWIDTH] IN BLOOD BY AUTOMATED COUNT: 12.5 % (ref 11.8–14.4)
EST. AVERAGE GLUCOSE BLD GHB EST-MCNC: 263 MG/DL
EST. AVERAGE GLUCOSE BLD GHB EST-MCNC: 275 MG/DL
GFR SERPL CREATININE-BSD FRML MDRD: >60 ML/MIN/1.73M2
GFR SERPL CREATININE-BSD FRML MDRD: >60 ML/MIN/1.73M2
GLUCOSE BLD-MCNC: 111 MG/DL (ref 74–100)
GLUCOSE BLD-MCNC: 117 MG/DL (ref 74–100)
GLUCOSE BLD-MCNC: 182 MG/DL (ref 74–100)
GLUCOSE BLD-MCNC: 204 MG/DL (ref 74–100)
GLUCOSE SERPL-MCNC: 112 MG/DL (ref 70–99)
GLUCOSE SERPL-MCNC: 178 MG/DL (ref 70–99)
HBA1C MFR BLD: 10.8 % (ref 4–6)
HBA1C MFR BLD: 11.2 % (ref 4–6)
HCT VFR BLD AUTO: 34.9 % (ref 40.7–50.3)
HGB BLD-MCNC: 12.4 G/DL (ref 13–17)
IMM GRANULOCYTES # BLD AUTO: 0.04 K/UL (ref 0–0.3)
IMM GRANULOCYTES NFR BLD: 0 %
LYMPHOCYTES NFR BLD: 1.62 K/UL (ref 1.1–3.7)
LYMPHOCYTES RELATIVE PERCENT: 16 % (ref 24–43)
MAGNESIUM SERPL-MCNC: 1.9 MG/DL (ref 1.6–2.6)
MCH RBC QN AUTO: 32.6 PG (ref 25.2–33.5)
MCHC RBC AUTO-ENTMCNC: 35.5 G/DL (ref 28.4–34.8)
MCV RBC AUTO: 91.8 FL (ref 82.6–102.9)
MONOCYTES NFR BLD: 0.8 K/UL (ref 0.1–1.2)
MONOCYTES NFR BLD: 8 % (ref 3–12)
NEUTROPHILS NFR BLD: 75 % (ref 36–65)
NEUTS SEG NFR BLD: 7.84 K/UL (ref 1.5–8.1)
NRBC BLD-RTO: 0 PER 100 WBC
PHOSPHATE SERPL-MCNC: 2.9 MG/DL (ref 2.5–4.5)
PLATELET # BLD AUTO: 259 K/UL (ref 138–453)
PMV BLD AUTO: 10 FL (ref 8.1–13.5)
POTASSIUM SERPL-SCNC: 3.8 MMOL/L (ref 3.7–5.3)
POTASSIUM SERPL-SCNC: 4.1 MMOL/L (ref 3.7–5.3)
RBC # BLD AUTO: 3.8 M/UL (ref 4.21–5.77)
SODIUM SERPL-SCNC: 133 MMOL/L (ref 135–144)
SODIUM SERPL-SCNC: 137 MMOL/L (ref 135–144)
WBC OTHER # BLD: 10.4 K/UL (ref 3.5–11.3)

## 2023-09-03 PROCEDURE — 36415 COLL VENOUS BLD VENIPUNCTURE: CPT

## 2023-09-03 PROCEDURE — 80048 BASIC METABOLIC PNL TOTAL CA: CPT

## 2023-09-03 PROCEDURE — 83735 ASSAY OF MAGNESIUM: CPT

## 2023-09-03 PROCEDURE — 6370000000 HC RX 637 (ALT 250 FOR IP): Performed by: STUDENT IN AN ORGANIZED HEALTH CARE EDUCATION/TRAINING PROGRAM

## 2023-09-03 PROCEDURE — 85025 COMPLETE CBC W/AUTO DIFF WBC: CPT

## 2023-09-03 PROCEDURE — 6360000002 HC RX W HCPCS: Performed by: STUDENT IN AN ORGANIZED HEALTH CARE EDUCATION/TRAINING PROGRAM

## 2023-09-03 PROCEDURE — 84100 ASSAY OF PHOSPHORUS: CPT

## 2023-09-03 PROCEDURE — 94761 N-INVAS EAR/PLS OXIMETRY MLT: CPT

## 2023-09-03 RX ORDER — ONDANSETRON 4 MG/1
4 TABLET, ORALLY DISINTEGRATING ORAL EVERY 8 HOURS PRN
Qty: 42 TABLET | Refills: 0 | Status: SHIPPED | OUTPATIENT
Start: 2023-09-03

## 2023-09-03 RX ORDER — ZOLPIDEM TARTRATE 10 MG/1
10 TABLET ORAL NIGHTLY PRN
Status: DISCONTINUED | OUTPATIENT
Start: 2023-09-03 | End: 2023-09-03 | Stop reason: HOSPADM

## 2023-09-03 RX ORDER — LISINOPRIL 10 MG/1
10 TABLET ORAL DAILY
Status: DISCONTINUED | OUTPATIENT
Start: 2023-09-03 | End: 2023-09-03 | Stop reason: HOSPADM

## 2023-09-03 RX ORDER — INSULIN LISPRO 100 [IU]/ML
4 INJECTION, SOLUTION INTRAVENOUS; SUBCUTANEOUS
Qty: 5 ADJUSTABLE DOSE PRE-FILLED PEN SYRINGE | Refills: 0 | Status: SHIPPED | OUTPATIENT
Start: 2023-09-03

## 2023-09-03 RX ORDER — INSULIN GLARGINE 100 [IU]/ML
15 INJECTION, SOLUTION SUBCUTANEOUS NIGHTLY
Qty: 5 ADJUSTABLE DOSE PRE-FILLED PEN SYRINGE | Refills: 1 | Status: SHIPPED | OUTPATIENT
Start: 2023-09-03

## 2023-09-03 RX ORDER — ZOLPIDEM TARTRATE 10 MG/1
10 TABLET ORAL NIGHTLY PRN
Qty: 7 TABLET | Refills: 0 | Status: SHIPPED | OUTPATIENT
Start: 2023-09-03 | End: 2023-09-10

## 2023-09-03 RX ADMIN — LISINOPRIL 10 MG: 10 TABLET ORAL at 08:52

## 2023-09-03 RX ADMIN — ENOXAPARIN SODIUM 40 MG: 100 INJECTION SUBCUTANEOUS at 08:53

## 2023-09-03 RX ADMIN — ASPIRIN 81 MG: 81 TABLET, CHEWABLE ORAL at 08:52

## 2023-09-03 NOTE — DISCHARGE SUMMARY
[facility]  [address] [place], [place], [address]    Discharge Summary      NAME:  Mila Gee  :  1960  MRN:  [id number]    Admit date:  2023  Discharge date:  23      Admitting Physician:  Chaim James MD    Primary Diagnosis on Admission:   Present on Admission:   Diabetic ketoacidosis without coma associated with type 1 diabetes mellitus (HCC)   HTN (hypertension)   Nausea & vomiting      Secondary Diagnoses:  does not have any pertinent problems on file. Admission Condition:  critical     Discharged Condition: good    Hospital Course: The patient was admitted for the management of DKA. The patient's had symptoms of gastroenteritis over the past week and he had noticed that his glucose readings gradually increased. His symptoms worsened and he presented to the ED for further evaluation at the time. He was started on IVF, insulin drip and electrolytes were monitored and replaced. He was started on basal insulin and sliding scale to transition him until he is able to resume the use of an insulin pump. He also needed some medications for sleep as he has a history of severe anxiety/insomnia and tolerates these well. Today on day of discharge pt feels better with no further complaints. Vitals and Labs are stable. Consults:  none    Significant Diagnostic/theraputic interventions: IVF, IV insulin, Insulin SubQ, Labs    Disposition:   home    Instructions to Patient:      Follow up with PCP within 1 week's time    Discharge Medications:       Medication List        START taking these medications      insulin lispro (1 Unit Dial) 100 UNIT/ML Sopn  Commonly known as: HumaLOG KwikPen  Inject 4 Units into the skin 3 times daily (before meals) Glucose: Dose:  No Insulin; 200-249 4 Units; 250-299 8 Units; 300-349 12 Units;  Over 349 16 Units and notify physician     Insulin Pen Needle 32G X 4 MM Misc  1 each by Does not apply route daily     Lantus SoloStar 100 UNIT/ML

## 2023-09-03 NOTE — PLAN OF CARE
Problem: Discharge Planning  Goal: Discharge to home or other facility with appropriate resources  Outcome: Adequate for Discharge  Flowsheets (Taken 9/3/2023 0800)  Discharge to home or other facility with appropriate resources: Identify barriers to discharge with patient and caregiver     Problem: Safety - Adult  Goal: Free from fall injury  Outcome: Adequate for Discharge     Problem: Pain  Goal: Verbalizes/displays adequate comfort level or baseline comfort level  Outcome: Adequate for Discharge     Problem: Nutrition Deficit:  Goal: Optimize nutritional status  Outcome: Adequate for Discharge     Problem: Neurosensory - Adult  Goal: Achieves maximal functionality and self care  Outcome: Adequate for Discharge     Problem: Respiratory - Adult  Goal: Achieves optimal ventilation and oxygenation  Outcome: Adequate for Discharge     Problem: Cardiovascular - Adult  Goal: Maintains optimal cardiac output and hemodynamic stability  Outcome: Adequate for Discharge  Goal: Absence of cardiac dysrhythmias or at baseline  Outcome: Adequate for Discharge     Problem: Skin/Tissue Integrity - Adult  Goal: Skin integrity remains intact  Outcome: Adequate for Discharge  Goal: Oral mucous membranes remain intact  Outcome: Adequate for Discharge     Problem: Musculoskeletal - Adult  Goal: Return mobility to safest level of function  Outcome: Adequate for Discharge  Goal: Return ADL status to a safe level of function  Outcome: Adequate for Discharge     Problem: Gastrointestinal - Adult  Goal: Minimal or absence of nausea and vomiting  Outcome: Adequate for Discharge  Goal: Maintains or returns to baseline bowel function  Outcome: Adequate for Discharge  Goal: Maintains adequate nutritional intake  Outcome: Adequate for Discharge     Problem: Genitourinary - Adult  Goal: Absence of urinary retention  Outcome: Adequate for Discharge     Problem: Infection - Adult  Goal: Absence of infection at discharge  Outcome: Adequate for

## 2023-09-03 NOTE — PROGRESS NOTES
[facility]  [address] [place], [place], [address]    Progress Note    Date:   9/3/2023  Patient name:  Thuan Ferro  Date of admission:  9/1/2023  7:05 PM  MRN:   [id number]  YOB: 1960    SUBJECTIVE/Last 24 hours update:     Patient seen and examined at the bed side , no new acute events overnight except for hypocalcemia, no new complains this morning. He had some sleep last night. The patient is in the process of transitioning to a new insulin pump and plans to use insulin pens for now. No n/v/d. Notes from nursing staff and Consults had been reviewed, and the overnight progress had been checked with the nursing staff as well. REVIEW OF SYSTEMS:      CONSTITUTIONAL:  no fevers, no headcahes  EYES: negative for blury vision  HEENT: No headaches, No nasal congestion, no difficulty swallowing  RESPIRATORY:negative for dyspnea, no wheezing, no Cough  CARDIOVASCULAR: negative for chest pain, no palpitations  GASTROINTESTINAL: no nausea, no vomiting, no change in bowel habits, no abdominal pain   GENITOURINARY: negative for dysuria, no hematuria   MUSCULOSKELETAL: no joint pains, no muscle aches, no swelling of joints or extremities  NEUROLOGICAL: No  Weakness or numbness    PAST MEDICAL HISTORY:      has a past medical history of Diabetes mellitus ([address]) and Hypertension. PAST SURGICAL HISTORY:     Reviewed and non-contributory or as noted above and/or in the HPI    SOCIAL HISTORY:      reports that he has been smoking cigarettes. He has been smoking an average of 1 pack per day. He has never used smokeless tobacco. He reports current alcohol use. He reports that he does not use drugs. TOBACCO:   reports that he has been smoking cigarettes. He has been smoking an average of 1 pack per day. He has never used smokeless tobacco.  ETOH:   reports current alcohol use. Reviewed and non-contributory or as noted above and/or in the HPI    FAMILY HISTORY:     History reviewed.  No

## 2023-09-03 NOTE — PROGRESS NOTES
Writer reviewed discharge and new medication administration instructions with pt as part of discharge process, Prescriptions have been received by pt S/O and are ready to be used at this time. Pt acknowledges understanding of and demonstrates back knowledge of proper testing preparation and dosing of blood sugars and insulin, as well as the differences between lantus and humalog for injection. Pt was escorted to the [facility] to a waiting private vehicle and then to home, vitals and blood sugar are stable.

## 2023-11-08 ENCOUNTER — HOSPITAL ENCOUNTER (OUTPATIENT)
Age: 63
Discharge: HOME | End: 2023-11-08
Payer: COMMERCIAL

## 2023-11-08 LAB
ALBUMIN SERPL-MCNC: 4.7 G/DL (ref 3.5–5.2)
ALBUMIN/GLOB SERPL: 1.8 {RATIO} (ref 1–2.5)
ALP SERPL-CCNC: 123 U/L (ref 40–129)
ALT SERPL-CCNC: 20 U/L (ref 5–41)
ANION GAP SERPL CALCULATED.3IONS-SCNC: 8 MMOL/L (ref 9–17)
AST SERPL-CCNC: 18 U/L
BASOPHILS # BLD: 0.09 K/UL (ref 0–0.2)
BASOPHILS NFR BLD: 2 % (ref 0–2)
BILIRUB SERPL-MCNC: <0.1 MG/DL (ref 0.3–1.2)
BUN SERPL-MCNC: 22 MG/DL (ref 8–23)
BUN/CREAT SERPL: 24 (ref 9–20)
CALCIUM SERPL-MCNC: 10.4 MG/DL (ref 8.6–10.4)
CHLORIDE SERPL-SCNC: 104 MMOL/L (ref 98–107)
CHOLEST SERPL-MCNC: 189 MG/DL (ref 0–199)
CHOLESTEROL/HDL RATIO: 3
CO2 SERPL-SCNC: 30 MMOL/L (ref 20–31)
CREAT SERPL-MCNC: 0.9 MG/DL (ref 0.7–1.2)
CREAT UR-MCNC: 169.9 MG/DL (ref 39–259)
EOSINOPHIL # BLD: 0.1 K/UL (ref 0–0.44)
EOSINOPHILS RELATIVE PERCENT: 2 % (ref 1–4)
ERYTHROCYTE [DISTWIDTH] IN BLOOD BY AUTOMATED COUNT: 12.7 % (ref 11.8–14.4)
EST. AVERAGE GLUCOSE BLD GHB EST-MCNC: 212 MG/DL
GFR SERPL CREATININE-BSD FRML MDRD: >60 ML/MIN/1.73M2
GLUCOSE P FAST SERPL-MCNC: 181 MG/DL (ref 70–99)
GLUCOSE SERPL-MCNC: 181 MG/DL (ref 70–99)
HBA1C MFR BLD: 9 % (ref 4–6)
HCT VFR BLD AUTO: 42.1 % (ref 40.7–50.3)
HDLC SERPL-MCNC: 61 MG/DL
HGB BLD-MCNC: 13.8 G/DL (ref 13–17)
IMM GRANULOCYTES # BLD AUTO: <0.03 K/UL (ref 0–0.3)
IMM GRANULOCYTES NFR BLD: 0 %
LDLC SERPL CALC-MCNC: 111 MG/DL (ref 0–100)
LYMPHOCYTES NFR BLD: 2.17 K/UL (ref 1.1–3.7)
LYMPHOCYTES RELATIVE PERCENT: 43 % (ref 24–43)
MCH RBC QN AUTO: 31.7 PG (ref 25.2–33.5)
MCHC RBC AUTO-ENTMCNC: 32.8 G/DL (ref 28.4–34.8)
MCV RBC AUTO: 96.6 FL (ref 82.6–102.9)
MICROALBUMIN UR-MCNC: 198 MG/L
MICROALBUMIN/CREAT UR-RTO: 117 MCG/MG CREAT
MONOCYTES NFR BLD: 0.58 K/UL (ref 0.1–1.2)
MONOCYTES NFR BLD: 11 % (ref 3–12)
NEUTROPHILS NFR BLD: 42 % (ref 36–65)
NEUTS SEG NFR BLD: 2.17 K/UL (ref 1.5–8.1)
NRBC BLD-RTO: 0 PER 100 WBC
PLATELET # BLD AUTO: 251 K/UL (ref 138–453)
PMV BLD AUTO: 9.6 FL (ref 8.1–13.5)
POTASSIUM SERPL-SCNC: 4.4 MMOL/L (ref 3.7–5.3)
PROT SERPL-MCNC: 7.3 G/DL (ref 6.4–8.3)
RBC # BLD AUTO: 4.36 M/UL (ref 4.21–5.77)
SODIUM SERPL-SCNC: 142 MMOL/L (ref 135–144)
TRIGL SERPL-MCNC: 84 MG/DL (ref 0–149)
TSH SERPL DL<=0.05 MIU/L-ACNC: 1.38 UIU/ML (ref 0.3–5)
VLDLC SERPL CALC-MCNC: 17 MG/DL
WBC OTHER # BLD: 5.1 K/UL (ref 3.5–11.3)

## 2023-11-08 PROCEDURE — 84443 ASSAY THYROID STIM HORMONE: CPT

## 2023-11-08 PROCEDURE — 82570 ASSAY OF URINE CREATININE: CPT

## 2023-11-08 PROCEDURE — 82043 UR ALBUMIN QUANTITATIVE: CPT

## 2023-11-08 PROCEDURE — 36415 COLL VENOUS BLD VENIPUNCTURE: CPT

## 2023-11-08 PROCEDURE — 85025 COMPLETE CBC W/AUTO DIFF WBC: CPT

## 2023-11-08 PROCEDURE — 80061 LIPID PANEL: CPT

## 2023-11-08 PROCEDURE — 83036 HEMOGLOBIN GLYCOSYLATED A1C: CPT

## 2023-11-08 PROCEDURE — 80053 COMPREHEN METABOLIC PANEL: CPT

## 2023-11-14 ENCOUNTER — HOSPITAL ENCOUNTER (OUTPATIENT)
Age: 63
Discharge: HOME | End: 2023-11-14

## 2023-11-14 LAB
EKG ATRIAL RATE: 87 BPM
EKG P AXIS: 64 DEGREES
EKG P-R INTERVAL: 114 MS
EKG Q-T INTERVAL: 364 MS
EKG QRS DURATION: 92 MS
EKG QTC CALCULATION (BAZETT): 438 MS
EKG R AXIS: 68 DEGREES
EKG T AXIS: 67 DEGREES
EKG VENTRICULAR RATE: 87 BPM

## 2024-03-21 ENCOUNTER — HOSPITAL ENCOUNTER (OUTPATIENT)
Age: 64
Discharge: HOME | End: 2024-03-21
Payer: COMMERCIAL

## 2024-03-21 LAB — GLUCOSE SERPL-MCNC: 221 MG/DL (ref 70–99)

## 2024-03-21 PROCEDURE — 84681 ASSAY OF C-PEPTIDE: CPT

## 2024-03-21 PROCEDURE — 36415 COLL VENOUS BLD VENIPUNCTURE: CPT

## 2024-03-21 PROCEDURE — 82947 ASSAY GLUCOSE BLOOD QUANT: CPT

## 2024-03-22 LAB — C PEPTIDE SERPL-MCNC: <0 NG/ML (ref 1.1–4.4)

## 2025-02-11 ENCOUNTER — HOSPITAL ENCOUNTER (OUTPATIENT)
Age: 65
Discharge: HOME | End: 2025-02-11
Payer: COMMERCIAL

## 2025-02-11 LAB
ALBUMIN SERPL-MCNC: 4 G/DL (ref 3.5–5.2)
ALBUMIN/GLOB SERPL: 1.3 {RATIO} (ref 1–2.5)
ALP SERPL-CCNC: 101 U/L (ref 40–129)
ALT SERPL-CCNC: 12 U/L (ref 10–50)
ANION GAP SERPL CALCULATED.3IONS-SCNC: 10 MMOL/L (ref 9–16)
AST SERPL-CCNC: 15 U/L (ref 10–50)
BASOPHILS # BLD: 0.08 K/UL (ref 0–0.2)
BASOPHILS NFR BLD: 1 % (ref 0–2)
BILIRUB SERPL-MCNC: 0.2 MG/DL (ref 0–1.2)
BUN SERPL-MCNC: 26 MG/DL (ref 8–23)
BUN/CREAT SERPL: 26 (ref 9–20)
CALCIUM SERPL-MCNC: 9.4 MG/DL (ref 8.6–10.4)
CHLORIDE SERPL-SCNC: 102 MMOL/L (ref 98–107)
CHOLEST SERPL-MCNC: 174 MG/DL (ref 0–199)
CHOLESTEROL/HDL RATIO: 4.1
CO2 SERPL-SCNC: 28 MMOL/L (ref 20–31)
CREAT SERPL-MCNC: 1 MG/DL (ref 0.7–1.2)
EOSINOPHIL # BLD: 0.07 K/UL (ref 0–0.44)
EOSINOPHILS RELATIVE PERCENT: 1 % (ref 1–4)
ERYTHROCYTE [DISTWIDTH] IN BLOOD BY AUTOMATED COUNT: 12.6 % (ref 11.8–14.4)
GFR, ESTIMATED: 82 ML/MIN/1.73M2
GLUCOSE SERPL-MCNC: 151 MG/DL (ref 74–99)
HCT VFR BLD AUTO: 42.2 % (ref 40.7–50.3)
HDLC SERPL-MCNC: 42 MG/DL
HGB BLD-MCNC: 14.1 G/DL (ref 13–17)
IMM GRANULOCYTES # BLD AUTO: 0.04 K/UL (ref 0–0.3)
IMM GRANULOCYTES NFR BLD: 1 %
LDLC SERPL CALC-MCNC: 117 MG/DL (ref 0–100)
LYMPHOCYTES NFR BLD: 2.87 K/UL (ref 1.1–3.7)
LYMPHOCYTES RELATIVE PERCENT: 43 % (ref 24–43)
MCH RBC QN AUTO: 30.7 PG (ref 25.2–33.5)
MCHC RBC AUTO-ENTMCNC: 33.4 G/DL (ref 28.4–34.8)
MCV RBC AUTO: 91.7 FL (ref 82.6–102.9)
MONOCYTES NFR BLD: 0.59 K/UL (ref 0.1–1.2)
MONOCYTES NFR BLD: 9 % (ref 3–12)
NEUTROPHILS NFR BLD: 45 % (ref 36–65)
NEUTS SEG NFR BLD: 3.04 K/UL (ref 1.5–8.1)
NRBC BLD-RTO: 0 PER 100 WBC
PLATELET # BLD AUTO: 326 K/UL (ref 138–453)
PMV BLD AUTO: 9.8 FL (ref 8.1–13.5)
POTASSIUM SERPL-SCNC: 4.6 MMOL/L (ref 3.7–5.3)
PROT SERPL-MCNC: 7.1 G/DL (ref 6.6–8.7)
RBC # BLD AUTO: 4.6 M/UL (ref 4.21–5.77)
SODIUM SERPL-SCNC: 140 MMOL/L (ref 136–145)
TRIGL SERPL-MCNC: 76 MG/DL
TSH SERPL DL<=0.05 MIU/L-ACNC: 1.25 UIU/ML (ref 0.27–4.2)
VLDLC SERPL CALC-MCNC: 15 MG/DL (ref 1–30)
WBC OTHER # BLD: 6.7 K/UL (ref 3.5–11.3)

## 2025-02-11 PROCEDURE — 80061 LIPID PANEL: CPT

## 2025-02-11 PROCEDURE — 84443 ASSAY THYROID STIM HORMONE: CPT

## 2025-02-11 PROCEDURE — 85025 COMPLETE CBC W/AUTO DIFF WBC: CPT

## 2025-02-11 PROCEDURE — 36415 COLL VENOUS BLD VENIPUNCTURE: CPT

## 2025-02-11 PROCEDURE — 80053 COMPREHEN METABOLIC PANEL: CPT

## 2025-03-05 ENCOUNTER — TRANSCRIBE ORDERS (OUTPATIENT)
Dept: ADMINISTRATIVE | Age: 65
End: 2025-03-05

## 2025-03-05 DIAGNOSIS — Z12.2 ENCOUNTER FOR SCREENING FOR MALIGNANT NEOPLASM OF RESPIRATORY ORGANS: Primary | ICD-10-CM

## 2025-03-05 DIAGNOSIS — F17.211 CIGARETTE NICOTINE DEPENDENCE IN REMISSION: ICD-10-CM

## 2025-03-20 ENCOUNTER — APPOINTMENT (OUTPATIENT)
Dept: GENERAL RADIOLOGY | Age: 65
End: 2025-03-20
Payer: COMMERCIAL

## 2025-03-20 ENCOUNTER — HOSPITAL ENCOUNTER (EMERGENCY)
Age: 65
Discharge: HOME | End: 2025-03-20
Attending: EMERGENCY MEDICINE
Payer: COMMERCIAL

## 2025-03-20 VITALS
SYSTOLIC BLOOD PRESSURE: 151 MMHG | RESPIRATION RATE: 17 BRPM | HEART RATE: 107 BPM | OXYGEN SATURATION: 96 % | DIASTOLIC BLOOD PRESSURE: 80 MMHG

## 2025-03-20 DIAGNOSIS — R07.89 CHEST WALL PAIN: Primary | ICD-10-CM

## 2025-03-20 LAB
ANION GAP SERPL CALCULATED.3IONS-SCNC: 12 MMOL/L (ref 9–16)
BASOPHILS # BLD: 0.08 K/UL (ref 0–0.2)
BASOPHILS NFR BLD: 1 % (ref 0–2)
BUN SERPL-MCNC: 25 MG/DL (ref 8–23)
BUN/CREAT SERPL: 25 (ref 9–20)
CALCIUM SERPL-MCNC: 9.2 MG/DL (ref 8.6–10.4)
CHLORIDE SERPL-SCNC: 100 MMOL/L (ref 98–107)
CO2 SERPL-SCNC: 25 MMOL/L (ref 20–31)
CREAT SERPL-MCNC: 1 MG/DL (ref 0.7–1.2)
EOSINOPHIL # BLD: 0.1 K/UL (ref 0–0.44)
EOSINOPHILS RELATIVE PERCENT: 1 % (ref 1–4)
ERYTHROCYTE [DISTWIDTH] IN BLOOD BY AUTOMATED COUNT: 14.2 % (ref 11.8–14.4)
GFR, ESTIMATED: 89 ML/MIN/1.73M2
GLUCOSE SERPL-MCNC: 289 MG/DL (ref 74–99)
HCT VFR BLD AUTO: 32.6 % (ref 40.7–50.3)
HGB BLD-MCNC: 10.9 G/DL (ref 13–17)
IMM GRANULOCYTES # BLD AUTO: <0.03 K/UL (ref 0–0.3)
IMM GRANULOCYTES NFR BLD: 0 %
LYMPHOCYTES NFR BLD: 2.12 K/UL (ref 1.1–3.7)
LYMPHOCYTES RELATIVE PERCENT: 21 % (ref 24–43)
MCH RBC QN AUTO: 29.8 PG (ref 25.2–33.5)
MCHC RBC AUTO-ENTMCNC: 33.4 G/DL (ref 28.4–34.8)
MCV RBC AUTO: 89.1 FL (ref 82.6–102.9)
MONOCYTES NFR BLD: 0.67 K/UL (ref 0.1–1.2)
MONOCYTES NFR BLD: 7 % (ref 3–12)
NEUTROPHILS NFR BLD: 70 % (ref 36–65)
NEUTS SEG NFR BLD: 7.18 K/UL (ref 1.5–8.1)
NRBC BLD-RTO: 0 PER 100 WBC
PLATELET # BLD AUTO: 336 K/UL (ref 138–453)
PMV BLD AUTO: 9.8 FL (ref 8.1–13.5)
POTASSIUM SERPL-SCNC: 4.6 MMOL/L (ref 3.7–5.3)
RBC # BLD AUTO: 3.66 M/UL (ref 4.21–5.77)
SODIUM SERPL-SCNC: 137 MMOL/L (ref 136–145)
TROPONIN I SERPL HS-MCNC: 24 NG/L (ref 0–22)
WBC OTHER # BLD: 10.2 K/UL (ref 3.5–11.3)

## 2025-03-20 PROCEDURE — 99285 EMERGENCY DEPT VISIT HI MDM: CPT

## 2025-03-20 PROCEDURE — 71045 X-RAY EXAM CHEST 1 VIEW: CPT

## 2025-03-20 PROCEDURE — 93005 ELECTROCARDIOGRAM TRACING: CPT | Performed by: EMERGENCY MEDICINE

## 2025-03-20 PROCEDURE — 85025 COMPLETE CBC W/AUTO DIFF WBC: CPT

## 2025-03-20 PROCEDURE — 84484 ASSAY OF TROPONIN QUANT: CPT

## 2025-03-20 PROCEDURE — 6370000000 HC RX 637 (ALT 250 FOR IP): Performed by: EMERGENCY MEDICINE

## 2025-03-20 PROCEDURE — 80048 BASIC METABOLIC PNL TOTAL CA: CPT

## 2025-03-20 RX ORDER — OXYCODONE AND ACETAMINOPHEN 5; 325 MG/1; MG/1
2 TABLET ORAL ONCE
Refills: 0 | Status: COMPLETED | OUTPATIENT
Start: 2025-03-20 | End: 2025-03-20

## 2025-03-20 RX ORDER — IBUPROFEN 600 MG/1
600 TABLET, FILM COATED ORAL EVERY 8 HOURS PRN
Qty: 21 TABLET | Refills: 0 | Status: SHIPPED | OUTPATIENT
Start: 2025-03-20 | End: 2025-03-27

## 2025-03-20 RX ADMIN — OXYCODONE AND ACETAMINOPHEN 2 TABLET: 5; 325 TABLET ORAL at 20:49

## 2025-03-20 ASSESSMENT — LIFESTYLE VARIABLES
HOW OFTEN DO YOU HAVE A DRINK CONTAINING ALCOHOL: NEVER
HOW MANY STANDARD DRINKS CONTAINING ALCOHOL DO YOU HAVE ON A TYPICAL DAY: PATIENT DOES NOT DRINK

## 2025-03-20 ASSESSMENT — ENCOUNTER SYMPTOMS
SHORTNESS OF BREATH: 0
WHEEZING: 0
CHEST TIGHTNESS: 1
STRIDOR: 0

## 2025-03-21 LAB
EKG ATRIAL RATE: 110 BPM
EKG P AXIS: 58 DEGREES
EKG P-R INTERVAL: 130 MS
EKG Q-T INTERVAL: 320 MS
EKG QRS DURATION: 78 MS
EKG QTC CALCULATION (BAZETT): 433 MS
EKG R AXIS: 65 DEGREES
EKG T AXIS: 71 DEGREES
EKG VENTRICULAR RATE: 110 BPM

## 2025-03-21 PROCEDURE — 93010 ELECTROCARDIOGRAM REPORT: CPT | Performed by: INTERNAL MEDICINE

## 2025-03-21 NOTE — ED PROVIDER NOTES
normal range or not returned as of this dictation.    EMERGENCY DEPARTMENT COURSE and DIFFERENTIAL DIAGNOSIS/MDM:   Vitals:    Vitals:    03/20/25 2019   BP: (!) 178/84   Pulse: (!) 111   Resp: 18   SpO2: 97%           MDM  Number of Diagnoses or Management Options  Chest wall pain  Diagnosis management comments: His EKG shows a rate of one 10 repeat rate is 89 no evidence of ischemic changes nor dysrhythmia after receiving Percocet        MIPS       REASSESSMENT          CRITICAL CARE TIME   Total Critical Care time was  minutes, excluding separately reportable procedures.  There was a high probability of clinically significant/life threatening deterioration in the patient's condition which required my urgent intervention.      CONSULTS:  None    PROCEDURES:  Unless otherwise noted below, none     Procedures        FINAL IMPRESSION      1. Chest wall pain          DISPOSITION/PLAN   DISPOSITION Discharge - Pending Orders Complete 03/20/2025 08:46:03 PM      PATIENT REFERRED TO:  Larisa Abbasi APRN [facility]  [address] [facility] [address]  [phone]    Schedule an appointment as soon as possible for a visit in 2 days      Larisa Abbasi APRN [facility]  [address] [facility] [address]  [phone]    Schedule an appointment as soon as possible for a visit in 2 days        DISCHARGE MEDICATIONS:  New Prescriptions    No medications on file     Controlled Substances Monitoring:          No data to display                (Please note that portions of this note were completed with a voice recognition program.  Efforts were made to edit the dictations but occasionally words are mis-transcribed.)    Ricardo Doss MD (electronically signed)  Attending Emergency Physician            Ricardo Doss MD  03/20/25 2648

## 2025-03-26 ENCOUNTER — APPOINTMENT (OUTPATIENT)
Dept: ULTRASOUND IMAGING | Age: 65
End: 2025-03-26
Payer: COMMERCIAL

## 2025-03-26 ENCOUNTER — APPOINTMENT (OUTPATIENT)
Dept: GENERAL RADIOLOGY | Age: 65
DRG: 121 | End: 2025-03-26
Attending: INTERNAL MEDICINE
Payer: COMMERCIAL

## 2025-03-26 ENCOUNTER — HOSPITAL ENCOUNTER (EMERGENCY)
Age: 65
Discharge: ANOTHER HOSPITAL | End: 2025-03-26
Attending: EMERGENCY MEDICINE
Payer: COMMERCIAL

## 2025-03-26 ENCOUNTER — APPOINTMENT (OUTPATIENT)
Dept: GENERAL RADIOLOGY | Age: 65
End: 2025-03-26
Payer: COMMERCIAL

## 2025-03-26 ENCOUNTER — HOSPITAL ENCOUNTER (INPATIENT)
Age: 65
LOS: 12 days | Discharge: HOME | DRG: 121 | End: 2025-04-07
Attending: INTERNAL MEDICINE | Admitting: PHYSICIAN ASSISTANT
Payer: COMMERCIAL

## 2025-03-26 ENCOUNTER — APPOINTMENT (OUTPATIENT)
Dept: CT IMAGING | Age: 65
End: 2025-03-26
Attending: EMERGENCY MEDICINE
Payer: COMMERCIAL

## 2025-03-26 VITALS
OXYGEN SATURATION: 93 % | BODY MASS INDEX: 20.22 KG/M2 | HEART RATE: 111 BPM | DIASTOLIC BLOOD PRESSURE: 73 MMHG | SYSTOLIC BLOOD PRESSURE: 143 MMHG | TEMPERATURE: 97.5 F | WEIGHT: 145 LBS | RESPIRATION RATE: 26 BRPM

## 2025-03-26 DIAGNOSIS — Z77.22 TOBACCO SMOKE EXPOSURE: ICD-10-CM

## 2025-03-26 DIAGNOSIS — J18.9 PNEUMONIA OF LEFT LOWER LOBE DUE TO INFECTIOUS ORGANISM: ICD-10-CM

## 2025-03-26 DIAGNOSIS — J90 PLEURAL EFFUSION: ICD-10-CM

## 2025-03-26 DIAGNOSIS — Z98.890 S/P THORACOTOMY: ICD-10-CM

## 2025-03-26 DIAGNOSIS — I24.9 ACUTE CORONARY SYNDROME (HCC): Primary | ICD-10-CM

## 2025-03-26 DIAGNOSIS — I21.4 NSTEMI (NON-ST ELEVATED MYOCARDIAL INFARCTION) (HCC): Primary | ICD-10-CM

## 2025-03-26 DIAGNOSIS — J86.9 EMPYEMA (HCC): ICD-10-CM

## 2025-03-26 DIAGNOSIS — Z88.0 PENICILLIN ALLERGY: ICD-10-CM

## 2025-03-26 LAB
ALBUMIN SERPL-MCNC: 3.2 G/DL (ref 3.5–5.2)
ALBUMIN/GLOB SERPL: 0.9 {RATIO} (ref 1–2.5)
ALP SERPL-CCNC: 110 U/L (ref 40–129)
ALT SERPL-CCNC: 9 U/L (ref 10–50)
ANION GAP SERPL CALC-SCNC: 8 MEQ/L (ref 8–16)
ANION GAP SERPL CALCULATED.3IONS-SCNC: 8 MMOL/L (ref 9–16)
APPEARANCE FLD: ABNORMAL
APTT PPP: 31.3 SECONDS (ref 22–38)
AST SERPL-CCNC: 11 U/L (ref 10–50)
BASOPHILS # BLD: 0.08 K/UL (ref 0–0.2)
BASOPHILS NFR BLD: 1 % (ref 0–2)
BILIRUB SERPL-MCNC: 0.3 MG/DL (ref 0–1.2)
BLASTS NFR FLD: ABNORMAL %
BNP SERPL-MCNC: 474 PG/ML (ref 0–125)
BODY FLD TYPE: ABNORMAL
BUN SERPL-MCNC: 24 MG/DL (ref 8–23)
BUN SERPL-MCNC: 26 MG/DL (ref 8–23)
BUN/CREAT SERPL: 26 (ref 9–20)
CALCIUM SERPL-MCNC: 8.5 MG/DL (ref 8.8–10.2)
CALCIUM SERPL-MCNC: 8.8 MG/DL (ref 8.6–10.4)
CHLORIDE SERPL-SCNC: 101 MEQ/L (ref 98–111)
CHLORIDE SERPL-SCNC: 102 MMOL/L (ref 98–107)
CO2 SERPL-SCNC: 25 MEQ/L (ref 22–29)
CO2 SERPL-SCNC: 25 MMOL/L (ref 20–31)
COLOR FLD: YELLOW
CREAT SERPL-MCNC: 1 MG/DL (ref 0.7–1.2)
CREAT SERPL-MCNC: 1.1 MG/DL (ref 0.7–1.2)
DEPRECATED RDW RBC AUTO: 46.3 FL (ref 35–45)
EKG ATRIAL RATE: 102 BPM
EKG ATRIAL RATE: 111 BPM
EKG P AXIS: 34 DEGREES
EKG P AXIS: 50 DEGREES
EKG P-R INTERVAL: 126 MS
EKG P-R INTERVAL: 130 MS
EKG Q-T INTERVAL: 316 MS
EKG Q-T INTERVAL: 326 MS
EKG QRS DURATION: 80 MS
EKG QRS DURATION: 90 MS
EKG QTC CALCULATION (BAZETT): 424 MS
EKG QTC CALCULATION (BAZETT): 429 MS
EKG R AXIS: 38 DEGREES
EKG R AXIS: 61 DEGREES
EKG T AXIS: 55 DEGREES
EKG T AXIS: 72 DEGREES
EKG VENTRICULAR RATE: 102 BPM
EKG VENTRICULAR RATE: 111 BPM
EOSINOPHIL # BLD: 0.43 K/UL (ref 0–0.44)
EOSINOPHIL NFR FLD: 1 %
EOSINOPHILS RELATIVE PERCENT: 4 % (ref 1–4)
ERYTHROCYTE [DISTWIDTH] IN BLOOD BY AUTOMATED COUNT: 13.9 % (ref 11.8–14.4)
ERYTHROCYTE [DISTWIDTH] IN BLOOD BY AUTOMATED COUNT: 14.4 % (ref 11.5–14.5)
FLUAV RNA RESP QL NAA+PROBE: NOT DETECTED
FLUBV RNA RESP QL NAA+PROBE: NOT DETECTED
GFR SERPL CREATININE-BSD FRML MDRD: 76 ML/MIN/1.73M2
GFR, ESTIMATED: 83 ML/MIN/1.73M2
GLUCOSE BLD STRIP.AUTO-MCNC: 106 MG/DL (ref 70–108)
GLUCOSE FLD-MCNC: 239 MG/DL
GLUCOSE SERPL-MCNC: 353 MG/DL (ref 74–99)
GLUCOSE SERPL-MCNC: 98 MG/DL (ref 74–109)
HCT VFR BLD AUTO: 29.4 % (ref 42–52)
HCT VFR BLD AUTO: 31.2 % (ref 40.7–50.3)
HEPARIN UNFRACTIONATED: 0.06 U/ML (ref 0.3–0.7)
HGB BLD-MCNC: 10.2 G/DL (ref 13–17)
HGB BLD-MCNC: 9.5 GM/DL (ref 14–18)
IMM GRANULOCYTES # BLD AUTO: 0.05 K/UL (ref 0–0.3)
IMM GRANULOCYTES NFR BLD: 1 %
INR PPP: 1
INR PPP: 1.18 (ref 0.85–1.13)
LDH FLD L TO P-CCNC: 266 U/L
LYMPHOCYTES NFR BLD: 1.12 K/UL (ref 1.1–3.7)
LYMPHOCYTES NFR FLD: 13 %
LYMPHOCYTES RELATIVE PERCENT: 10 % (ref 24–43)
MAGNESIUM SERPL-MCNC: 2 MG/DL (ref 1.6–2.6)
MCH RBC QN AUTO: 28.5 PG (ref 26–33)
MCH RBC QN AUTO: 29.1 PG (ref 25.2–33.5)
MCHC RBC AUTO-ENTMCNC: 32.3 GM/DL (ref 32.2–35.5)
MCHC RBC AUTO-ENTMCNC: 32.7 G/DL (ref 28.4–34.8)
MCV RBC AUTO: 88.3 FL (ref 80–94)
MCV RBC AUTO: 89.1 FL (ref 82.6–102.9)
MONOCYTES NFR BLD: 1.02 K/UL (ref 0.1–1.2)
MONOCYTES NFR BLD: 9 % (ref 3–12)
MONOCYTES NFR FLD: ABNORMAL %
NEUTROPHILS NFR BLD: 75 % (ref 36–65)
NEUTROPHILS NFR FLD: 86 %
NEUTS SEG NFR BLD: 8.1 K/UL (ref 1.5–8.1)
NRBC BLD-RTO: 0 PER 100 WBC
NT-PROBNP SERPL IA-MCNC: 507 PG/ML (ref 0–124)
PARTIAL THROMBOPLASTIN TIME: 30.3 SEC (ref 26.8–34.8)
PH FLUID: 7
PLATELET # BLD AUTO: 404 THOU/MM3 (ref 130–400)
PLATELET # BLD AUTO: 408 K/UL (ref 138–453)
PMV BLD AUTO: 9.5 FL (ref 9.4–12.4)
PMV BLD AUTO: 9.6 FL (ref 8.1–13.5)
POTASSIUM SERPL-SCNC: 4.8 MEQ/L (ref 3.5–5.2)
POTASSIUM SERPL-SCNC: 4.9 MMOL/L (ref 3.7–5.3)
PROT FLD-MCNC: 3.8 G/DL
PROT SERPL-MCNC: 6.6 G/DL (ref 6.6–8.7)
PROTHROMBIN TIME: 13.2 SEC (ref 11.7–14.1)
RBC # BLD AUTO: 3.33 MILL/MM3 (ref 4.7–6.1)
RBC # BLD AUTO: 3.5 M/UL (ref 4.21–5.77)
RBC # FLD: 8000 CELLS/UL
SARS-COV-2 RNA RESP QL NAA+PROBE: NOT DETECTED
SODIUM SERPL-SCNC: 134 MEQ/L (ref 135–145)
SODIUM SERPL-SCNC: 135 MMOL/L (ref 136–145)
SPECIMEN TYPE: NORMAL
TROPONIN I SERPL HS-MCNC: 28 NG/L (ref 0–22)
TROPONIN I SERPL HS-MCNC: 36 NG/L (ref 0–22)
TROPONIN, HIGH SENSITIVITY: 34 NG/L (ref 0–12)
TROPONIN, HIGH SENSITIVITY: 38 NG/L (ref 0–12)
UNIDENT CELLS NFR FLD: ABNORMAL %
WBC # BLD AUTO: 11.6 THOU/MM3 (ref 4.8–10.8)
WBC # FLD: 9003 CELLS/UL
WBC OTHER # BLD: 10.8 K/UL (ref 3.5–11.3)

## 2025-03-26 PROCEDURE — 83880 ASSAY OF NATRIURETIC PEPTIDE: CPT

## 2025-03-26 PROCEDURE — 36415 COLL VENOUS BLD VENIPUNCTURE: CPT

## 2025-03-26 PROCEDURE — 96375 TX/PRO/DX INJ NEW DRUG ADDON: CPT

## 2025-03-26 PROCEDURE — 85610 PROTHROMBIN TIME: CPT

## 2025-03-26 PROCEDURE — 85730 THROMBOPLASTIN TIME PARTIAL: CPT

## 2025-03-26 PROCEDURE — 82945 GLUCOSE OTHER FLUID: CPT

## 2025-03-26 PROCEDURE — 87205 SMEAR GRAM STAIN: CPT

## 2025-03-26 PROCEDURE — 83735 ASSAY OF MAGNESIUM: CPT

## 2025-03-26 PROCEDURE — 96374 THER/PROPH/DIAG INJ IV PUSH: CPT

## 2025-03-26 PROCEDURE — 89051 BODY FLUID CELL COUNT: CPT

## 2025-03-26 PROCEDURE — 6360000004 HC RX CONTRAST MEDICATION: Performed by: EMERGENCY MEDICINE

## 2025-03-26 PROCEDURE — 87070 CULTURE OTHR SPECIMN AEROBIC: CPT

## 2025-03-26 PROCEDURE — 2500000003 HC RX 250 WO HCPCS

## 2025-03-26 PROCEDURE — 1200000003 HC TELEMETRY R&B

## 2025-03-26 PROCEDURE — 99285 EMERGENCY DEPT VISIT HI MDM: CPT

## 2025-03-26 PROCEDURE — 84484 ASSAY OF TROPONIN QUANT: CPT

## 2025-03-26 PROCEDURE — 87075 CULTR BACTERIA EXCEPT BLOOD: CPT

## 2025-03-26 PROCEDURE — 96376 TX/PRO/DX INJ SAME DRUG ADON: CPT

## 2025-03-26 PROCEDURE — 85520 HEPARIN ASSAY: CPT

## 2025-03-26 PROCEDURE — 84157 ASSAY OF PROTEIN OTHER: CPT

## 2025-03-26 PROCEDURE — 87077 CULTURE AEROBIC IDENTIFY: CPT

## 2025-03-26 PROCEDURE — 87636 SARSCOV2 & INF A&B AMP PRB: CPT

## 2025-03-26 PROCEDURE — 80053 COMPREHEN METABOLIC PANEL: CPT

## 2025-03-26 PROCEDURE — 71275 CT ANGIOGRAPHY CHEST: CPT

## 2025-03-26 PROCEDURE — 83615 LACTATE (LD) (LDH) ENZYME: CPT

## 2025-03-26 PROCEDURE — 83986 ASSAY PH BODY FLUID NOS: CPT

## 2025-03-26 PROCEDURE — 71045 X-RAY EXAM CHEST 1 VIEW: CPT

## 2025-03-26 PROCEDURE — 82948 REAGENT STRIP/BLOOD GLUCOSE: CPT

## 2025-03-26 PROCEDURE — 87076 CULTURE ANAEROBE IDENT EACH: CPT

## 2025-03-26 PROCEDURE — 80048 BASIC METABOLIC PNL TOTAL CA: CPT

## 2025-03-26 PROCEDURE — 99222 1ST HOSP IP/OBS MODERATE 55: CPT

## 2025-03-26 PROCEDURE — 6360000002 HC RX W HCPCS: Performed by: PHYSICIAN ASSISTANT

## 2025-03-26 PROCEDURE — 6360000002 HC RX W HCPCS: Performed by: EMERGENCY MEDICINE

## 2025-03-26 PROCEDURE — 6360000002 HC RX W HCPCS

## 2025-03-26 PROCEDURE — 93010 ELECTROCARDIOGRAM REPORT: CPT | Performed by: INTERNAL MEDICINE

## 2025-03-26 PROCEDURE — 6370000000 HC RX 637 (ALT 250 FOR IP)

## 2025-03-26 PROCEDURE — 32554 ASPIRATE PLEURA W/O IMAGING: CPT

## 2025-03-26 PROCEDURE — 32555 ASPIRATE PLEURA W/ IMAGING: CPT

## 2025-03-26 PROCEDURE — 85025 COMPLETE CBC W/AUTO DIFF WBC: CPT

## 2025-03-26 PROCEDURE — 93005 ELECTROCARDIOGRAM TRACING: CPT | Performed by: EMERGENCY MEDICINE

## 2025-03-26 PROCEDURE — 85027 COMPLETE CBC AUTOMATED: CPT

## 2025-03-26 PROCEDURE — 93005 ELECTROCARDIOGRAM TRACING: CPT

## 2025-03-26 RX ORDER — TRAZODONE HYDROCHLORIDE 50 MG/1
50 TABLET ORAL NIGHTLY PRN
Status: DISCONTINUED | OUTPATIENT
Start: 2025-03-26 | End: 2025-04-07 | Stop reason: HOSPADM

## 2025-03-26 RX ORDER — MORPHINE SULFATE 4 MG/ML
4 INJECTION, SOLUTION INTRAMUSCULAR; INTRAVENOUS ONCE
Status: COMPLETED | OUTPATIENT
Start: 2025-03-26 | End: 2025-03-26

## 2025-03-26 RX ORDER — ONDANSETRON 2 MG/ML
4 INJECTION INTRAMUSCULAR; INTRAVENOUS EVERY 6 HOURS PRN
Status: DISCONTINUED | OUTPATIENT
Start: 2025-03-26 | End: 2025-04-07 | Stop reason: HOSPADM

## 2025-03-26 RX ORDER — GLUCAGON 1 MG/ML
1 KIT INJECTION PRN
Status: DISCONTINUED | OUTPATIENT
Start: 2025-03-26 | End: 2025-04-07 | Stop reason: HOSPADM

## 2025-03-26 RX ORDER — POTASSIUM CHLORIDE 1500 MG/1
40 TABLET, EXTENDED RELEASE ORAL PRN
Status: ACTIVE | OUTPATIENT
Start: 2025-03-26 | End: 2025-03-27

## 2025-03-26 RX ORDER — SODIUM CHLORIDE 9 MG/ML
INJECTION, SOLUTION INTRAVENOUS PRN
Status: DISCONTINUED | OUTPATIENT
Start: 2025-03-26 | End: 2025-04-07 | Stop reason: HOSPADM

## 2025-03-26 RX ORDER — SODIUM CHLORIDE 0.9 % (FLUSH) 0.9 %
5-40 SYRINGE (ML) INJECTION EVERY 12 HOURS SCHEDULED
Status: DISCONTINUED | OUTPATIENT
Start: 2025-03-26 | End: 2025-03-26 | Stop reason: HOSPADM

## 2025-03-26 RX ORDER — DEXTROSE MONOHYDRATE 100 MG/ML
INJECTION, SOLUTION INTRAVENOUS CONTINUOUS PRN
Status: DISCONTINUED | OUTPATIENT
Start: 2025-03-26 | End: 2025-04-07 | Stop reason: HOSPADM

## 2025-03-26 RX ORDER — ONDANSETRON 4 MG/1
4 TABLET, ORALLY DISINTEGRATING ORAL EVERY 8 HOURS PRN
Status: DISCONTINUED | OUTPATIENT
Start: 2025-03-26 | End: 2025-04-07 | Stop reason: HOSPADM

## 2025-03-26 RX ORDER — MAGNESIUM SULFATE IN WATER 40 MG/ML
2000 INJECTION, SOLUTION INTRAVENOUS PRN
Status: DISCONTINUED | OUTPATIENT
Start: 2025-03-26 | End: 2025-04-04

## 2025-03-26 RX ORDER — POTASSIUM CHLORIDE 7.45 MG/ML
10 INJECTION INTRAVENOUS PRN
Status: ACTIVE | OUTPATIENT
Start: 2025-03-26 | End: 2025-03-27

## 2025-03-26 RX ORDER — INSULIN GLARGINE 100 [IU]/ML
15 INJECTION, SOLUTION SUBCUTANEOUS NIGHTLY
Status: DISCONTINUED | OUTPATIENT
Start: 2025-03-26 | End: 2025-04-03

## 2025-03-26 RX ORDER — HEPARIN SODIUM 1000 [USP'U]/ML
80 INJECTION, SOLUTION INTRAVENOUS; SUBCUTANEOUS ONCE
Status: COMPLETED | OUTPATIENT
Start: 2025-03-26 | End: 2025-03-26

## 2025-03-26 RX ORDER — IOPAMIDOL 755 MG/ML
100 INJECTION, SOLUTION INTRAVASCULAR
Status: COMPLETED | OUTPATIENT
Start: 2025-03-26 | End: 2025-03-26

## 2025-03-26 RX ORDER — HEPARIN SODIUM 10000 [USP'U]/100ML
5-30 INJECTION, SOLUTION INTRAVENOUS CONTINUOUS
Status: DISCONTINUED | OUTPATIENT
Start: 2025-03-26 | End: 2025-03-27

## 2025-03-26 RX ORDER — LISINOPRIL 10 MG/1
10 TABLET ORAL DAILY
Status: DISCONTINUED | OUTPATIENT
Start: 2025-03-27 | End: 2025-03-27 | Stop reason: ALTCHOICE

## 2025-03-26 RX ORDER — ACETAMINOPHEN 650 MG/1
650 SUPPOSITORY RECTAL EVERY 6 HOURS PRN
Status: DISCONTINUED | OUTPATIENT
Start: 2025-03-26 | End: 2025-03-28

## 2025-03-26 RX ORDER — LIDOCAINE 4 G/G
2 PATCH TOPICAL DAILY
Status: DISCONTINUED | OUTPATIENT
Start: 2025-03-27 | End: 2025-04-07 | Stop reason: HOSPADM

## 2025-03-26 RX ORDER — ONDANSETRON 2 MG/ML
4 INJECTION INTRAMUSCULAR; INTRAVENOUS ONCE
Status: COMPLETED | OUTPATIENT
Start: 2025-03-26 | End: 2025-03-26

## 2025-03-26 RX ORDER — HEPARIN SODIUM 1000 [USP'U]/ML
30 INJECTION, SOLUTION INTRAVENOUS; SUBCUTANEOUS PRN
Status: DISCONTINUED | OUTPATIENT
Start: 2025-03-26 | End: 2025-03-27

## 2025-03-26 RX ORDER — SODIUM CHLORIDE 0.9 % (FLUSH) 0.9 %
5-40 SYRINGE (ML) INJECTION PRN
Status: DISCONTINUED | OUTPATIENT
Start: 2025-03-26 | End: 2025-03-26 | Stop reason: HOSPADM

## 2025-03-26 RX ORDER — LIDOCAINE HYDROCHLORIDE 10 MG/ML
INJECTION, SOLUTION EPIDURAL; INFILTRATION; INTRACAUDAL; PERINEURAL PRN
Status: COMPLETED | OUTPATIENT
Start: 2025-03-26 | End: 2025-03-26

## 2025-03-26 RX ORDER — HEPARIN SODIUM 1000 [USP'U]/ML
40 INJECTION, SOLUTION INTRAVENOUS; SUBCUTANEOUS PRN
Status: DISCONTINUED | OUTPATIENT
Start: 2025-03-26 | End: 2025-03-26 | Stop reason: HOSPADM

## 2025-03-26 RX ORDER — HEPARIN SODIUM 10000 [USP'U]/100ML
5-30 INJECTION, SOLUTION INTRAVENOUS CONTINUOUS
Status: DISCONTINUED | OUTPATIENT
Start: 2025-03-26 | End: 2025-03-26 | Stop reason: HOSPADM

## 2025-03-26 RX ORDER — INSULIN LISPRO 100 [IU]/ML
4 INJECTION, SOLUTION INTRAVENOUS; SUBCUTANEOUS
Status: DISCONTINUED | OUTPATIENT
Start: 2025-03-27 | End: 2025-04-03

## 2025-03-26 RX ORDER — ATORVASTATIN CALCIUM 80 MG/1
80 TABLET, FILM COATED ORAL NIGHTLY
Status: DISCONTINUED | OUTPATIENT
Start: 2025-03-26 | End: 2025-04-07 | Stop reason: HOSPADM

## 2025-03-26 RX ORDER — SODIUM CHLORIDE 0.9 % (FLUSH) 0.9 %
5-40 SYRINGE (ML) INJECTION PRN
Status: DISCONTINUED | OUTPATIENT
Start: 2025-03-26 | End: 2025-04-04

## 2025-03-26 RX ORDER — ASPIRIN 81 MG/1
81 TABLET, CHEWABLE ORAL DAILY
Status: DISCONTINUED | OUTPATIENT
Start: 2025-03-27 | End: 2025-04-07 | Stop reason: HOSPADM

## 2025-03-26 RX ORDER — HEPARIN SODIUM 1000 [USP'U]/ML
60 INJECTION, SOLUTION INTRAVENOUS; SUBCUTANEOUS PRN
Status: DISCONTINUED | OUTPATIENT
Start: 2025-03-26 | End: 2025-03-27

## 2025-03-26 RX ORDER — HEPARIN SODIUM 1000 [USP'U]/ML
80 INJECTION, SOLUTION INTRAVENOUS; SUBCUTANEOUS PRN
Status: DISCONTINUED | OUTPATIENT
Start: 2025-03-26 | End: 2025-03-26 | Stop reason: HOSPADM

## 2025-03-26 RX ORDER — POLYETHYLENE GLYCOL 3350 17 G/17G
17 POWDER, FOR SOLUTION ORAL DAILY PRN
Status: DISCONTINUED | OUTPATIENT
Start: 2025-03-26 | End: 2025-03-29

## 2025-03-26 RX ORDER — SODIUM CHLORIDE 9 MG/ML
INJECTION, SOLUTION INTRAVENOUS PRN
Status: DISCONTINUED | OUTPATIENT
Start: 2025-03-26 | End: 2025-03-26 | Stop reason: HOSPADM

## 2025-03-26 RX ORDER — ACETAMINOPHEN 325 MG/1
650 TABLET ORAL EVERY 6 HOURS PRN
Status: DISCONTINUED | OUTPATIENT
Start: 2025-03-26 | End: 2025-03-28

## 2025-03-26 RX ORDER — SODIUM CHLORIDE 0.9 % (FLUSH) 0.9 %
5-40 SYRINGE (ML) INJECTION EVERY 12 HOURS SCHEDULED
Status: DISCONTINUED | OUTPATIENT
Start: 2025-03-26 | End: 2025-04-07 | Stop reason: HOSPADM

## 2025-03-26 RX ADMIN — HEPARIN SODIUM 3900 UNITS: 1000 INJECTION INTRAVENOUS; SUBCUTANEOUS at 21:37

## 2025-03-26 RX ADMIN — MORPHINE SULFATE 4 MG: 4 INJECTION, SOLUTION INTRAMUSCULAR; INTRAVENOUS at 22:55

## 2025-03-26 RX ADMIN — ATORVASTATIN CALCIUM 80 MG: 80 TABLET, FILM COATED ORAL at 21:28

## 2025-03-26 RX ADMIN — LIDOCAINE HYDROCHLORIDE 9 ML: 10 INJECTION, SOLUTION EPIDURAL; INFILTRATION; INTRACAUDAL; PERINEURAL at 12:58

## 2025-03-26 RX ADMIN — ACETAMINOPHEN 650 MG: 325 TABLET ORAL at 21:30

## 2025-03-26 RX ADMIN — MORPHINE SULFATE 4 MG: 4 INJECTION, SOLUTION INTRAMUSCULAR; INTRAVENOUS at 17:01

## 2025-03-26 RX ADMIN — SODIUM CHLORIDE, PRESERVATIVE FREE 10 ML: 5 INJECTION INTRAVENOUS at 21:29

## 2025-03-26 RX ADMIN — MORPHINE SULFATE 4 MG: 4 INJECTION, SOLUTION INTRAMUSCULAR; INTRAVENOUS at 12:01

## 2025-03-26 RX ADMIN — MORPHINE SULFATE 4 MG: 4 INJECTION, SOLUTION INTRAMUSCULAR; INTRAVENOUS at 09:06

## 2025-03-26 RX ADMIN — HEPARIN SODIUM 5300 UNITS: 1000 INJECTION INTRAVENOUS; SUBCUTANEOUS at 13:47

## 2025-03-26 RX ADMIN — ONDANSETRON 4 MG: 2 INJECTION, SOLUTION INTRAMUSCULAR; INTRAVENOUS at 09:06

## 2025-03-26 RX ADMIN — IOPAMIDOL 100 ML: 755 INJECTION, SOLUTION INTRAVENOUS at 10:26

## 2025-03-26 RX ADMIN — HEPARIN SODIUM 18 UNITS/KG/HR: 10000 INJECTION, SOLUTION INTRAVENOUS at 13:50

## 2025-03-26 ASSESSMENT — PAIN DESCRIPTION - PAIN TYPE
TYPE: ACUTE PAIN
TYPE: ACUTE PAIN

## 2025-03-26 ASSESSMENT — PAIN DESCRIPTION - LOCATION
LOCATION: CHEST
LOCATION: CHEST;ABDOMEN
LOCATION: CHEST

## 2025-03-26 ASSESSMENT — PAIN SCALES - GENERAL
PAINLEVEL_OUTOF10: 10
PAINLEVEL_OUTOF10: 8
PAINLEVEL_OUTOF10: 10
PAINLEVEL_OUTOF10: 8
PAINLEVEL_OUTOF10: 10

## 2025-03-26 ASSESSMENT — PAIN DESCRIPTION - DESCRIPTORS
DESCRIPTORS: ACHING
DESCRIPTORS: SHARP
DESCRIPTORS: SHARP;STABBING
DESCRIPTORS: ACHING;SHARP

## 2025-03-26 ASSESSMENT — PAIN DESCRIPTION - ONSET
ONSET: ON-GOING
ONSET: ON-GOING

## 2025-03-26 ASSESSMENT — PAIN - FUNCTIONAL ASSESSMENT
PAIN_FUNCTIONAL_ASSESSMENT: ACTIVITIES ARE NOT PREVENTED
PAIN_FUNCTIONAL_ASSESSMENT: 0-10
PAIN_FUNCTIONAL_ASSESSMENT: ACTIVITIES ARE NOT PREVENTED

## 2025-03-26 ASSESSMENT — PAIN DESCRIPTION - FREQUENCY
FREQUENCY: CONTINUOUS
FREQUENCY: CONTINUOUS

## 2025-03-26 ASSESSMENT — PAIN DESCRIPTION - ORIENTATION
ORIENTATION: LEFT

## 2025-03-26 NOTE — BRIEF OP NOTE
Brief Postoperative Note for Thoracentesis    Dona Kaur  YOB: 1960  [id number]    Pre-operative Diagnosis: left loculated pleural effusion      Post-operative Diagnosis: Same    Procedure: Ultrasound guided thoracentesis     Anesthesia: 1% Lidocaine     Surgeons/Assistants: Dr. Rivas and Charline Roland PA-C    Complications: none    EBL: Minimal    Specimens: Were obtained    Ultrasound guided left thoracentesis performed with patient sitting upright on gurney. 400 ml cloudy rachel fluid obtained. Dressing applied.  Patient tolerated procedure well.    Electronically signed by Charline Roland PA-C on 3/26/2025 at 1:07 PM

## 2025-03-26 NOTE — ED PROVIDER NOTES
[facility] EMERGENCY DEPARTMENT  EMERGENCY DEPARTMENT ENCOUNTER      Pt Name: Dona Kaur  MRN: [id number]  Birthdate 1960  Date of evaluation: 3/26/2025  Provider: Carina Bentley MD    CHIEF COMPLAINT       Chief Complaint   Patient presents with    Chest Pain     Patient complains of chest pain that has been ongoing for the past 8 days. Patient seen last week for similar issues. Patient states only way to make pain go away is to lay on stomach.         HISTORY OF PRESENT ILLNESS   (Location/Symptom, Timing/Onset, Context/Setting, Quality, Duration, Modifying Factors, Severity)  Note limiting factors.   Dona Kaur is a 64 y.o. male who presents to the emergency department      64-year-old who presented emergency department for evaluation of left-sided chest pain abdominal pain has been ongoing for the past several days.  He was seen about a week ago in the ER for similar and was sent home with diagnosis of musculoskeletal pain.  Patient does report he has been having pain for several weeks but it is intensifying and has been constant overnight.  He is uncomfortable at home and only able to sleep when he lies on his abdomen.  Denies any fevers chills or cough.  Does report that he is feeling short of breath.  No lower extremity weakness numbness or tingling.  No headaches or focal neurological deficits.    Patient does report having a cough about 6 weeks ago.  States he was sick for a few weeks at home.  He had not sought medical treatment for his symptoms.  He did report however he had quit smoking at this time.  Since then he has not any fevers or chills.  No productive cough, no URI symptoms, no known sick contacts.  No other acute concerns        Nursing Notes were reviewed.    REVIEW OF SYSTEMS    (2-9 systems for level 4, 10 or more for level 5)     Review of Systems   All other systems reviewed and are negative.      Except as noted above the remainder of the review of systems was reviewed and negative.

## 2025-03-26 NOTE — OR NURSING
Pt drained 450 ml of fluid.  Pt's b/p dropped.  Dr. Rivas aware.  Drsg applied after catheter removed.

## 2025-03-27 ENCOUNTER — APPOINTMENT (OUTPATIENT)
Dept: CT IMAGING | Age: 65
DRG: 121 | End: 2025-03-27
Attending: INTERNAL MEDICINE
Payer: COMMERCIAL

## 2025-03-27 ENCOUNTER — APPOINTMENT (OUTPATIENT)
Dept: GENERAL RADIOLOGY | Age: 65
DRG: 121 | End: 2025-03-27
Attending: INTERNAL MEDICINE
Payer: COMMERCIAL

## 2025-03-27 ENCOUNTER — APPOINTMENT (OUTPATIENT)
Age: 65
DRG: 121 | End: 2025-03-27
Payer: COMMERCIAL

## 2025-03-27 ENCOUNTER — APPOINTMENT (OUTPATIENT)
Dept: ULTRASOUND IMAGING | Age: 65
DRG: 121 | End: 2025-03-27
Attending: INTERNAL MEDICINE
Payer: COMMERCIAL

## 2025-03-27 PROBLEM — R06.02 SOB (SHORTNESS OF BREATH): Status: ACTIVE | Noted: 2025-03-27

## 2025-03-27 PROBLEM — J90 LOCULATED PLEURAL EFFUSION: Status: ACTIVE | Noted: 2025-03-27

## 2025-03-27 LAB
ANION GAP SERPL CALC-SCNC: 8 MEQ/L (ref 8–16)
ANION GAP SERPL CALC-SCNC: 9 MEQ/L (ref 8–16)
BACTERIA: ABNORMAL
BILIRUB UR QL STRIP: NEGATIVE
BUN SERPL-MCNC: 28 MG/DL (ref 8–23)
BUN SERPL-MCNC: 30 MG/DL (ref 8–23)
CALCIUM SERPL-MCNC: 8.3 MG/DL (ref 8.8–10.2)
CALCIUM SERPL-MCNC: 8.4 MG/DL (ref 8.8–10.2)
CASTS #/AREA URNS LPF: ABNORMAL /LPF
CASTS #/AREA URNS LPF: ABNORMAL /LPF
CHARACTER UR: ABNORMAL
CHARACTER, BODY FLUID: ABNORMAL
CHARCOAL URNS QL MICRO: ABNORMAL
CHLORIDE 24H UR-SRATE: 23 MEQ/L
CHLORIDE SERPL-SCNC: 102 MEQ/L (ref 98–111)
CHLORIDE SERPL-SCNC: 99 MEQ/L (ref 98–111)
CHOLEST SERPL-MCNC: 89 MG/DL (ref 100–199)
CO2 SERPL-SCNC: 23 MEQ/L (ref 22–29)
CO2 SERPL-SCNC: 24 MEQ/L (ref 22–29)
COLOR FLD: YELLOW
COLOR UR: ABNORMAL
CREAT SERPL-MCNC: 1.5 MG/DL (ref 0.7–1.2)
CREAT SERPL-MCNC: 1.7 MG/DL (ref 0.7–1.2)
CREAT UR-MCNC: 293 MG/DL
CRYSTALS URNS QL MICRO: ABNORMAL
DEPRECATED MEAN GLUCOSE BLD GHB EST-ACNC: 201 MG/DL (ref 70–126)
ECHO AO ASC DIAM: 3.3 CM
ECHO AO ASCENDING AORTA INDEX: 1.76 CM/M2
ECHO AV CUSP MM: 2 CM
ECHO AV PEAK GRADIENT: 11 MMHG
ECHO AV PEAK VELOCITY: 1.6 M/S
ECHO AV VELOCITY RATIO: 0.88
ECHO LA AREA 2C: 14.1 CM2
ECHO LA AREA 4C: 12.7 CM2
ECHO LA DIAMETER INDEX: 1.6 CM/M2
ECHO LA DIAMETER: 3 CM
ECHO LA MAJOR AXIS: 4.2 CM
ECHO LA MINOR AXIS: 4.5 CM
ECHO LA VOL BP: 33 ML (ref 18–58)
ECHO LA VOL MOD A2C: 37 ML (ref 18–58)
ECHO LA VOL MOD A4C: 28 ML (ref 18–58)
ECHO LA VOL/BSA BIPLANE: 18 ML/M2 (ref 16–34)
ECHO LA VOLUME INDEX MOD A2C: 20 ML/M2 (ref 16–34)
ECHO LA VOLUME INDEX MOD A4C: 15 ML/M2 (ref 16–34)
ECHO LV E' LATERAL VELOCITY: 5.9 CM/S
ECHO LV E' SEPTAL VELOCITY: 6.9 CM/S
ECHO LV EF PHYSICIAN: 55 %
ECHO LV FRACTIONAL SHORTENING: 29 % (ref 28–44)
ECHO LV INTERNAL DIMENSION DIASTOLE INDEX: 2.62 CM/M2
ECHO LV INTERNAL DIMENSION DIASTOLIC: 4.9 CM (ref 4.2–5.9)
ECHO LV INTERNAL DIMENSION SYSTOLIC INDEX: 1.87 CM/M2
ECHO LV INTERNAL DIMENSION SYSTOLIC: 3.5 CM
ECHO LV ISOVOLUMETRIC RELAXATION TIME (IVRT): 63 MS
ECHO LV IVSD: 0.7 CM (ref 0.6–1)
ECHO LV MASS 2D: 110.8 G (ref 88–224)
ECHO LV MASS INDEX 2D: 59.2 G/M2 (ref 49–115)
ECHO LV POSTERIOR WALL DIASTOLIC: 0.7 CM (ref 0.6–1)
ECHO LV RELATIVE WALL THICKNESS RATIO: 0.29
ECHO LVOT PEAK GRADIENT: 7 MMHG
ECHO LVOT PEAK VELOCITY: 1.4 M/S
ECHO MV A VELOCITY: 1.16 M/S
ECHO MV E DECELERATION TIME (DT): 184 MS
ECHO MV E VELOCITY: 1.05 M/S
ECHO MV E/A RATIO: 0.91
ECHO MV E/E' LATERAL: 17.8
ECHO MV E/E' RATIO (AVERAGED): 16.51
ECHO MV E/E' SEPTAL: 15.22
ECHO MV REGURGITANT PEAK GRADIENT: 58 MMHG
ECHO MV REGURGITANT PEAK VELOCITY: 3.8 M/S
ECHO PV MAX VELOCITY: 0.6 M/S
ECHO PV PEAK GRADIENT: 1 MMHG
ECHO RV INTERNAL DIMENSION: 2.3 CM
ECHO RV TAPSE: 2.1 CM (ref 1.7–?)
ECHO TV E WAVE: 0.5 M/S
ECHO TV REGURGITANT MAX VELOCITY: 2.34 M/S
ECHO TV REGURGITANT PEAK GRADIENT: 22 MMHG
EOSINOPHIL NFR FLD MANUAL: 6 %
EPITHELIAL CELLS, UA: ABNORMAL /HPF
GFR SERPL CREATININE-BSD FRML MDRD: 45 ML/MIN/1.73M2
GFR SERPL CREATININE-BSD FRML MDRD: 52 ML/MIN/1.73M2
GLUCOSE BLD STRIP.AUTO-MCNC: 160 MG/DL (ref 70–108)
GLUCOSE BLD STRIP.AUTO-MCNC: 215 MG/DL (ref 70–108)
GLUCOSE BLD STRIP.AUTO-MCNC: 232 MG/DL (ref 70–108)
GLUCOSE BLD STRIP.AUTO-MCNC: 237 MG/DL (ref 70–108)
GLUCOSE FLD-MCNC: 16 MG/DL
GLUCOSE SERPL-MCNC: 181 MG/DL (ref 74–109)
GLUCOSE SERPL-MCNC: 184 MG/DL (ref 74–109)
GLUCOSE UR QL STRIP.AUTO: 100 MG/DL
GRANULOCYTES NFR FLD AUTO: 0 %
HBA1C MFR BLD HPLC: 8.7 % (ref 4–6)
HDLC SERPL-MCNC: 28 MG/DL
HEPARIN UNFRACTIONATED: 0.16 U/ML (ref 0.3–0.7)
HEPARIN UNFRACTIONATED: 0.2 U/ML (ref 0.3–0.7)
HEPARIN UNFRACTIONATED: < 0.04 U/ML (ref 0.3–0.7)
HEPARIN UNFRACTIONATED: < 0.04 U/ML (ref 0.3–0.7)
HGB UR QL STRIP.AUTO: NEGATIVE
KETONES UR QL STRIP.AUTO: ABNORMAL
LDH FLD L TO P-CCNC: 349 U/L
LDH SERPL L TO P-CCNC: 506 U/L (ref 135–225)
LDLC SERPL CALC-MCNC: 50 MG/DL
LEUKOCYTE ESTERASE UR QL STRIP.AUTO: NEGATIVE
LYMPHOCYTES NFR FLD MANUAL: 10 %
MONOCYTES NFR FLD MANUAL: 4 %
MONONUC CELLS NFR FLD AUTO: 0 %
NEUTS SEG NFR FLD MANUAL: 80 %
NITRITE UR QL STRIP.AUTO: NEGATIVE
NUC CELL # FLD AUTO: 652 /CUMM (ref 0–500)
OSMOLALITY UR: 550 MOSMOL/KG (ref 250–750)
PATHOLOGIST REVIEW: ABNORMAL
PH BIFL: 7.05 [PH]
PH UR STRIP.AUTO: 5 [PH] (ref 5–9)
POTASSIUM SERPL-SCNC: 5.2 MEQ/L (ref 3.5–5.2)
POTASSIUM SERPL-SCNC: 6 MEQ/L (ref 3.5–5.2)
POTASSIUM UR-SCNC: 95.4 MEQ/L
PROT FLD-MCNC: 4 GM/DL
PROT SERPL-MCNC: 5.8 G/DL (ref 6.4–8.3)
PROT UR STRIP.AUTO-MCNC: 30 MG/DL
RBC # FLD AUTO: 4000 /CUMM
RBC #/AREA URNS HPF: ABNORMAL /HPF
RENAL EPI CELLS #/AREA URNS HPF: ABNORMAL /[HPF]
SODIUM SERPL-SCNC: 130 MEQ/L (ref 135–145)
SODIUM SERPL-SCNC: 135 MEQ/L (ref 135–145)
SODIUM UR-SCNC: < 20 MEQ/L
SPECIFIC GRAVITY UA: > 1.03 (ref 1–1.03)
SPECIMEN: ABNORMAL
TOTAL VOLUME RECEIVED BODY FLUID: 30 ML
TRIGL SERPL-MCNC: 53 MG/DL (ref 0–199)
UROBILINOGEN, URINE: 1 EU/DL (ref 0–1)
WBC #/AREA URNS HPF: ABNORMAL /HPF
YEAST LIKE FUNGI URNS QL MICRO: ABNORMAL

## 2025-03-27 PROCEDURE — 0W9B30Z DRAINAGE OF LEFT PLEURAL CAVITY WITH DRAINAGE DEVICE, PERCUTANEOUS APPROACH: ICD-10-PCS | Performed by: RADIOLOGY

## 2025-03-27 PROCEDURE — 71045 X-RAY EXAM CHEST 1 VIEW: CPT

## 2025-03-27 PROCEDURE — 84133 ASSAY OF URINE POTASSIUM: CPT

## 2025-03-27 PROCEDURE — 32555 ASPIRATE PLEURA W/ IMAGING: CPT

## 2025-03-27 PROCEDURE — 83615 LACTATE (LD) (LDH) ENZYME: CPT

## 2025-03-27 PROCEDURE — 80061 LIPID PANEL: CPT

## 2025-03-27 PROCEDURE — 82570 ASSAY OF URINE CREATININE: CPT

## 2025-03-27 PROCEDURE — 85520 HEPARIN ASSAY: CPT

## 2025-03-27 PROCEDURE — 6370000000 HC RX 637 (ALT 250 FOR IP): Performed by: PHYSICIAN ASSISTANT

## 2025-03-27 PROCEDURE — 99223 1ST HOSP IP/OBS HIGH 75: CPT | Performed by: THORACIC SURGERY (CARDIOTHORACIC VASCULAR SURGERY)

## 2025-03-27 PROCEDURE — 87086 URINE CULTURE/COLONY COUNT: CPT

## 2025-03-27 PROCEDURE — 80048 BASIC METABOLIC PNL TOTAL CA: CPT

## 2025-03-27 PROCEDURE — 81001 URINALYSIS AUTO W/SCOPE: CPT

## 2025-03-27 PROCEDURE — 36415 COLL VENOUS BLD VENIPUNCTURE: CPT

## 2025-03-27 PROCEDURE — 87075 CULTR BACTERIA EXCEPT BLOOD: CPT

## 2025-03-27 PROCEDURE — 2500000003 HC RX 250 WO HCPCS

## 2025-03-27 PROCEDURE — 87070 CULTURE OTHR SPECIMN AEROBIC: CPT

## 2025-03-27 PROCEDURE — 82945 GLUCOSE OTHER FLUID: CPT

## 2025-03-27 PROCEDURE — 83935 ASSAY OF URINE OSMOLALITY: CPT

## 2025-03-27 PROCEDURE — 87077 CULTURE AEROBIC IDENTIFY: CPT

## 2025-03-27 PROCEDURE — 83986 ASSAY PH BODY FLUID NOS: CPT

## 2025-03-27 PROCEDURE — 84157 ASSAY OF PROTEIN OTHER: CPT

## 2025-03-27 PROCEDURE — 6360000002 HC RX W HCPCS

## 2025-03-27 PROCEDURE — 84300 ASSAY OF URINE SODIUM: CPT

## 2025-03-27 PROCEDURE — APPSS60 APP SPLIT SHARED TIME 46-60 MINUTES: Performed by: PHYSICIAN ASSISTANT

## 2025-03-27 PROCEDURE — 99223 1ST HOSP IP/OBS HIGH 75: CPT | Performed by: INTERNAL MEDICINE

## 2025-03-27 PROCEDURE — 99233 SBSQ HOSP IP/OBS HIGH 50: CPT | Performed by: PHYSICIAN ASSISTANT

## 2025-03-27 PROCEDURE — 87205 SMEAR GRAM STAIN: CPT

## 2025-03-27 PROCEDURE — 83036 HEMOGLOBIN GLYCOSYLATED A1C: CPT

## 2025-03-27 PROCEDURE — 82948 REAGENT STRIP/BLOOD GLUCOSE: CPT

## 2025-03-27 PROCEDURE — 2580000003 HC RX 258: Performed by: PHYSICIAN ASSISTANT

## 2025-03-27 PROCEDURE — 82436 ASSAY OF URINE CHLORIDE: CPT

## 2025-03-27 PROCEDURE — 93306 TTE W/DOPPLER COMPLETE: CPT | Performed by: INTERNAL MEDICINE

## 2025-03-27 PROCEDURE — 0W9B3ZZ DRAINAGE OF LEFT PLEURAL CAVITY, PERCUTANEOUS APPROACH: ICD-10-PCS | Performed by: RADIOLOGY

## 2025-03-27 PROCEDURE — 93306 TTE W/DOPPLER COMPLETE: CPT

## 2025-03-27 PROCEDURE — 1200000003 HC TELEMETRY R&B

## 2025-03-27 PROCEDURE — 2709999900 CT GUIDED PLEURAL DRAINAGE W CATH PERC

## 2025-03-27 PROCEDURE — 84155 ASSAY OF PROTEIN SERUM: CPT

## 2025-03-27 PROCEDURE — 89051 BODY FLUID CELL COUNT: CPT

## 2025-03-27 PROCEDURE — 6370000000 HC RX 637 (ALT 250 FOR IP)

## 2025-03-27 RX ORDER — LEVOFLOXACIN 5 MG/ML
750 INJECTION, SOLUTION INTRAVENOUS EVERY 24 HOURS
Status: DISCONTINUED | OUTPATIENT
Start: 2025-03-27 | End: 2025-03-27

## 2025-03-27 RX ORDER — SODIUM CHLORIDE 9 MG/ML
INJECTION, SOLUTION INTRAVENOUS CONTINUOUS
Status: ACTIVE | OUTPATIENT
Start: 2025-03-27 | End: 2025-03-28

## 2025-03-27 RX ORDER — LOSARTAN POTASSIUM AND HYDROCHLOROTHIAZIDE 12.5; 1 MG/1; MG/1
1 TABLET ORAL DAILY
Status: ON HOLD | COMMUNITY
End: 2025-04-07 | Stop reason: HOSPADM

## 2025-03-27 RX ORDER — LOSARTAN POTASSIUM 100 MG/1
100 TABLET ORAL DAILY
Status: DISCONTINUED | OUTPATIENT
Start: 2025-03-27 | End: 2025-04-04

## 2025-03-27 RX ORDER — OXYCODONE HYDROCHLORIDE 5 MG/1
5 TABLET ORAL EVERY 6 HOURS PRN
Refills: 0 | Status: DISCONTINUED | OUTPATIENT
Start: 2025-03-27 | End: 2025-03-28

## 2025-03-27 RX ORDER — LEVOFLOXACIN 5 MG/ML
500 INJECTION, SOLUTION INTRAVENOUS EVERY 24 HOURS
Status: DISCONTINUED | OUTPATIENT
Start: 2025-03-28 | End: 2025-03-28

## 2025-03-27 RX ORDER — HYDROCHLOROTHIAZIDE 12.5 MG/1
12.5 CAPSULE ORAL DAILY
Status: DISCONTINUED | OUTPATIENT
Start: 2025-03-27 | End: 2025-04-04

## 2025-03-27 RX ORDER — OXYCODONE HYDROCHLORIDE 5 MG/1
2.5 TABLET ORAL EVERY 6 HOURS PRN
Refills: 0 | Status: DISCONTINUED | OUTPATIENT
Start: 2025-03-27 | End: 2025-03-28

## 2025-03-27 RX ORDER — KETOROLAC TROMETHAMINE 30 MG/ML
15 INJECTION, SOLUTION INTRAMUSCULAR; INTRAVENOUS ONCE
Status: COMPLETED | OUTPATIENT
Start: 2025-03-27 | End: 2025-03-27

## 2025-03-27 RX ADMIN — OXYCODONE 5 MG: 5 TABLET ORAL at 18:52

## 2025-03-27 RX ADMIN — HEPARIN SODIUM 26 UNITS/KG/HR: 10000 INJECTION, SOLUTION INTRAVENOUS at 11:16

## 2025-03-27 RX ADMIN — KETOROLAC TROMETHAMINE 15 MG: 30 INJECTION, SOLUTION INTRAMUSCULAR at 07:43

## 2025-03-27 RX ADMIN — HEPARIN SODIUM 24 UNITS/KG/HR: 10000 INJECTION, SOLUTION INTRAVENOUS at 07:21

## 2025-03-27 RX ADMIN — SODIUM CHLORIDE, PRESERVATIVE FREE 10 ML: 5 INJECTION INTRAVENOUS at 07:49

## 2025-03-27 RX ADMIN — ACETAMINOPHEN 650 MG: 325 TABLET ORAL at 17:44

## 2025-03-27 RX ADMIN — HEPARIN SODIUM 2000 UNITS: 1000 INJECTION INTRAVENOUS; SUBCUTANEOUS at 11:20

## 2025-03-27 RX ADMIN — ASPIRIN 81 MG: 81 TABLET, CHEWABLE ORAL at 07:50

## 2025-03-27 RX ADMIN — ATORVASTATIN CALCIUM 80 MG: 80 TABLET, FILM COATED ORAL at 20:49

## 2025-03-27 RX ADMIN — HEPARIN SODIUM 2000 UNITS: 1000 INJECTION INTRAVENOUS; SUBCUTANEOUS at 03:43

## 2025-03-27 RX ADMIN — ONDANSETRON 4 MG: 2 INJECTION, SOLUTION INTRAMUSCULAR; INTRAVENOUS at 03:50

## 2025-03-27 RX ADMIN — LEVOFLOXACIN 750 MG: 5 INJECTION, SOLUTION INTRAVENOUS at 01:39

## 2025-03-27 RX ADMIN — SODIUM CHLORIDE: 0.9 INJECTION, SOLUTION INTRAVENOUS at 17:42

## 2025-03-27 ASSESSMENT — PAIN DESCRIPTION - ORIENTATION: ORIENTATION: LEFT

## 2025-03-27 ASSESSMENT — PAIN DESCRIPTION - LOCATION: LOCATION: FLANK

## 2025-03-27 ASSESSMENT — PAIN DESCRIPTION - DESCRIPTORS: DESCRIPTORS: ACHING;SHARP

## 2025-03-27 ASSESSMENT — PAIN SCALES - GENERAL
PAINLEVEL_OUTOF10: 10
PAINLEVEL_OUTOF10: 9

## 2025-03-27 NOTE — H&P
[facility]  Sedation/Analgesia History & Physical    Pt Name: Dona Kaur  MRN: [id number]  YOB: 1960  Provider Performing Procedure: Ted Mcmahan MD, MD  Primary Care Physician: Larisa Abbasi, APRN - CNP    Formulation and discussion of sedation / procedure plans, risks, benefits, side effects and alternatives with patient and/or responsible adult completed.    PRE-PROCEDURE   DNR-CCA/DNR-CC []Yes [x]No  Brief History/Pre-Procedure Diagnosis: Complex loculated left pleural effusion          MEDICAL HISTORY  []CAD/Valve  []Liver Disease  []Lung Disease []Diabetes  []Hypertension []Renal Disease  [x]Additional information:       has a past medical history of Diabetes mellitus (HCC) and Hypertension.    SURGICAL HISTORY   has no past surgical history on file.  Additional information:       ALLERGIES   Allergies as of 03/26/2025 - Fully Reviewed 03/26/2025   Allergen Reaction Noted    Pcn [penicillins] Hives and Shortness Of Breath 02/18/2020     Additional information:       MEDICATIONS   Coumadin Use Last 5 Days [x]No []Yes  Antiplatelet drug therapy use last 5 days  [x]No []Yes  Other anticoagulant use last 5 days  [x]No []Yes    Current Facility-Administered Medications:     levoFLOXacin (LEVAQUIN) 750 MG/150ML infusion 750 mg, 750 mg, IntraVENous, Q24H, Linnette Nettles, APRN - CNP, Stopped at 03/27/25 0333    [Held by provider] losartan (COZAAR) tablet 100 mg, 100 mg, Oral, Daily **AND** [Held by provider] hydroCHLOROthiazide capsule 12.5 mg, 12.5 mg, Oral, Daily, Katharine Leiva, PA    sodium chloride flush 0.9 % injection 5-40 mL, 5-40 mL, IntraVENous, 2 times per day, Linnette Nettles, APRN - CNP, 10 mL at 03/27/25 0749    sodium chloride flush 0.9 % injection 5-40 mL, 5-40 mL, IntraVENous, PRN, Jordana Nettlese A, APRN - CNP    0.9 % sodium chloride infusion, , IntraVENous, PRN, Ronald Nettleslie A, APRN - CNP    potassium chloride (KLOR-CON M) extended release tablet 
clubbing, cyanosis, or edema x 4.    Vasculature: capillary refill < 3 seconds. Palpable LE pulses bilaterally.  Skin:  warm and dry. No rashes or lesions.   Psych:  Alert and oriented x3.  Affect appropriate  Lymph:  No supraclavicular adenopathy.  Neurologic:  No focal deficit.  No seizures.    Data: (All radiographs, tracings, PFTs, and imaging are personally viewed and interpreted unless otherwise noted)  Labs:   Recent Labs     03/26/25  0845   WBC 10.8   HGB 10.2*   HCT 31.2*        Recent Labs     03/26/25  0845   *   K 4.9      CO2 25   BUN 26*   CREATININE 1.0   CALCIUM 8.8     Recent Labs     03/26/25  0845   AST 11   ALT 9*   BILITOT 0.3   ALKPHOS 110     Recent Labs     03/26/25  0845   INR 1.0     No results for input(s): \"CKTOTAL\", \"TROPONINI\" in the last 72 hours.  Urinalysis:   Lab Results   Component Value Date/Time    NITRU NEGATIVE 02/18/2020 11:51 AM    WBCUA 0 TO 2 02/18/2020 11:51 AM    BACTERIA TRACE 02/18/2020 11:51 AM    RBCUA 0 TO 2 02/18/2020 11:51 AM    GLUCOSEU 4+ 02/18/2020 11:51 AM       Radiology:  No orders to display     XR CHEST PORTABLE  Result Date: 3/26/2025  EXAMINATION: ONE XRAY VIEW OF THE CHEST 3/26/2025 5:02 pm COMPARISON: Chest radiograph obtained earlier the same date. HISTORY: ORDERING SYSTEM PROVIDED HISTORY: Shortness of breath chest pain TECHNOLOGIST PROVIDED HISTORY: Shortness of breath chest pain FINDINGS: There is persistent moderate left-sided pleural effusion, with adjacent atelectasis.  No pneumothorax is identified.  The visualized right lung is clear.     Persistent moderate left-sided pleural effusion, unchanged.  No evidence of pneumothorax.     XR CHEST PORTABLE  Result Date: 3/26/2025  EXAMINATION: ONE XRAY VIEW OF THE CHEST 3/26/2025 1:11 pm COMPARISON: CT 03/26/2025, chest x-ray 03/20/2025 HISTORY: ORDERING SYSTEM PROVIDED HISTORY: post thora TECHNOLOGIST PROVIDED HISTORY: post thora FINDINGS: No evidence of pneumothorax status post

## 2025-03-27 NOTE — PROCEDURES
PROCEDURE NOTE  Date: 3/26/2025   Name: Dona Kaur  YOB: 1960    Procedures        EKG was completed and handed to RN

## 2025-03-27 NOTE — CARE COORDINATION
Case Management Assessment Initial Evaluation    Date/Time of Evaluation: 3/27/2025 7:25 AM  Assessment Completed by: Sheila Trotter RN    If patient is discharged prior to next notation, then this note serves as note for discharge by case management.    Patient Name: Dona Kaur                   YOB: 1960  Diagnosis: Elevated troponin [R79.89]  Pleural effusion [J90]  NSTEMI (non-ST elevated myocardial infarction) (HCC) [I21.4]                   Date / Time: 3/26/2025  6:54 PM  Location: [address] [place] [address]     Patient Admission Status: Inpatient   Readmission Risk Low 0-14, Mod 15-19), High > 20: Readmission Risk Score: 13.1    Current PCP: Larisa Abbasi APRN - CNP  Health Care Decision Makers:     Additional [facility] Management [facility] diabetes management, Cardiology consult, Asa, Heparin drip, IV Levaquin, pain and nausea control, electrolyte replacement protocols.     Procedures:   3/26 Ultrasound guided thoracentesis @ [facility]    3/27 ECHO    Imaging:   3/26 CXR 1. Large left basilar pleural effusion with passive atelectasis and/or   left basilar consolidation. Given size of the left pleural effusion   recommend continued interval follow-up to resolution.     Patient Goals/Plan/Treatment Preferences: Met with Dona. He currently lives at home alone. He is independent. Plan is to return at discharge. At this time he denies need for DME and declines HH. Will follow.           03/27/25 1101   Service Assessment   Patient Orientation Alert and Oriented   Cognition Alert   History Provided By Patient   Primary Caregiver Self   Support Systems Spouse/Significant Other   Patient's Healthcare Decision Maker is: Patient Declined (Legal Next of Kin Remains as Decision Maker)   PCP Verified by CM Yes   Last Visit to PCP Within last 3 months   Prior Functional Level Independent in ADLs/IADLs   Current Functional Level Assistance with the following:;Mobility   Can

## 2025-03-28 ENCOUNTER — APPOINTMENT (OUTPATIENT)
Dept: GENERAL RADIOLOGY | Age: 65
DRG: 121 | End: 2025-03-28
Attending: INTERNAL MEDICINE
Payer: COMMERCIAL

## 2025-03-28 PROBLEM — Z88.0 PENICILLIN ALLERGY: Status: ACTIVE | Noted: 2025-03-28

## 2025-03-28 PROBLEM — J18.9 PNEUMONIA OF LEFT LUNG DUE TO INFECTIOUS ORGANISM: Status: ACTIVE | Noted: 2025-03-28

## 2025-03-28 PROBLEM — J86.9 EMPYEMA (HCC): Status: ACTIVE | Noted: 2025-03-28

## 2025-03-28 PROBLEM — I24.9 ACUTE CORONARY SYNDROME (HCC): Status: ACTIVE | Noted: 2025-03-28

## 2025-03-28 LAB
ANION GAP SERPL CALC-SCNC: 10 MEQ/L (ref 8–16)
BACTERIA UR CULT: ABNORMAL
BUN SERPL-MCNC: 28 MG/DL (ref 8–23)
CALCIUM SERPL-MCNC: 8.2 MG/DL (ref 8.8–10.2)
CHLORIDE SERPL-SCNC: 98 MEQ/L (ref 98–111)
CO2 SERPL-SCNC: 24 MEQ/L (ref 22–29)
CREAT SERPL-MCNC: 1.2 MG/DL (ref 0.7–1.2)
DEPRECATED RDW RBC AUTO: 47.6 FL (ref 35–45)
ERYTHROCYTE [DISTWIDTH] IN BLOOD BY AUTOMATED COUNT: 14.7 % (ref 11.5–14.5)
GFR SERPL CREATININE-BSD FRML MDRD: 68 ML/MIN/1.73M2
GLUCOSE BLD STRIP.AUTO-MCNC: 250 MG/DL (ref 70–108)
GLUCOSE SERPL-MCNC: 167 MG/DL (ref 74–109)
HCT VFR BLD AUTO: 27.3 % (ref 42–52)
HGB BLD-MCNC: 8.8 GM/DL (ref 14–18)
MCH RBC QN AUTO: 28.3 PG (ref 26–33)
MCHC RBC AUTO-ENTMCNC: 32.2 GM/DL (ref 32.2–35.5)
MCV RBC AUTO: 87.8 FL (ref 80–94)
ORGANISM: ABNORMAL
PLATELET # BLD AUTO: 418 THOU/MM3 (ref 130–400)
PMV BLD AUTO: 9.7 FL (ref 9.4–12.4)
POTASSIUM SERPL-SCNC: 4.6 MEQ/L (ref 3.5–5.2)
RBC # BLD AUTO: 3.11 MILL/MM3 (ref 4.7–6.1)
SODIUM SERPL-SCNC: 132 MEQ/L (ref 135–145)
WBC # BLD AUTO: 17.5 THOU/MM3 (ref 4.8–10.8)

## 2025-03-28 PROCEDURE — 87205 SMEAR GRAM STAIN: CPT

## 2025-03-28 PROCEDURE — 32561 LYSE CHEST FIBRIN INIT DAY: CPT | Performed by: PHYSICIAN ASSISTANT

## 2025-03-28 PROCEDURE — 87070 CULTURE OTHR SPECIMN AEROBIC: CPT

## 2025-03-28 PROCEDURE — 2500000003 HC RX 250 WO HCPCS: Performed by: PHYSICIAN ASSISTANT

## 2025-03-28 PROCEDURE — APPSS30 APP SPLIT SHARED TIME 16-30 MINUTES: Performed by: PHYSICIAN ASSISTANT

## 2025-03-28 PROCEDURE — 6360000002 HC RX W HCPCS: Performed by: PHYSICIAN ASSISTANT

## 2025-03-28 PROCEDURE — 82948 REAGENT STRIP/BLOOD GLUCOSE: CPT

## 2025-03-28 PROCEDURE — 1200000003 HC TELEMETRY R&B

## 2025-03-28 PROCEDURE — 6370000000 HC RX 637 (ALT 250 FOR IP): Performed by: PHYSICIAN ASSISTANT

## 2025-03-28 PROCEDURE — 6360000002 HC RX W HCPCS: Performed by: STUDENT IN AN ORGANIZED HEALTH CARE EDUCATION/TRAINING PROGRAM

## 2025-03-28 PROCEDURE — 87040 BLOOD CULTURE FOR BACTERIA: CPT

## 2025-03-28 PROCEDURE — 6370000000 HC RX 637 (ALT 250 FOR IP)

## 2025-03-28 PROCEDURE — 85027 COMPLETE CBC AUTOMATED: CPT

## 2025-03-28 PROCEDURE — 2580000003 HC RX 258: Performed by: PHYSICIAN ASSISTANT

## 2025-03-28 PROCEDURE — 99255 IP/OBS CONSLTJ NEW/EST HI 80: CPT | Performed by: STUDENT IN AN ORGANIZED HEALTH CARE EDUCATION/TRAINING PROGRAM

## 2025-03-28 PROCEDURE — 36415 COLL VENOUS BLD VENIPUNCTURE: CPT

## 2025-03-28 PROCEDURE — 99233 SBSQ HOSP IP/OBS HIGH 50: CPT | Performed by: PHYSICIAN ASSISTANT

## 2025-03-28 PROCEDURE — 6360000002 HC RX W HCPCS

## 2025-03-28 PROCEDURE — 80048 BASIC METABOLIC PNL TOTAL CA: CPT

## 2025-03-28 PROCEDURE — 2500000003 HC RX 250 WO HCPCS

## 2025-03-28 PROCEDURE — 71045 X-RAY EXAM CHEST 1 VIEW: CPT

## 2025-03-28 RX ORDER — METRONIDAZOLE 500 MG/100ML
500 INJECTION, SOLUTION INTRAVENOUS EVERY 12 HOURS
Status: DISCONTINUED | OUTPATIENT
Start: 2025-03-28 | End: 2025-03-30

## 2025-03-28 RX ORDER — OXYCODONE HYDROCHLORIDE 5 MG/1
5 TABLET ORAL EVERY 4 HOURS PRN
Refills: 0 | Status: DISCONTINUED | OUTPATIENT
Start: 2025-03-28 | End: 2025-04-07 | Stop reason: HOSPADM

## 2025-03-28 RX ORDER — OXYCODONE HYDROCHLORIDE 5 MG/1
2.5 TABLET ORAL EVERY 4 HOURS PRN
Refills: 0 | Status: DISCONTINUED | OUTPATIENT
Start: 2025-03-28 | End: 2025-04-07 | Stop reason: HOSPADM

## 2025-03-28 RX ORDER — LINEZOLID 2 MG/ML
600 INJECTION, SOLUTION INTRAVENOUS EVERY 12 HOURS
Status: DISCONTINUED | OUTPATIENT
Start: 2025-03-28 | End: 2025-03-30

## 2025-03-28 RX ORDER — ACETAMINOPHEN 500 MG
1000 TABLET ORAL EVERY 6 HOURS SCHEDULED
Status: DISCONTINUED | OUTPATIENT
Start: 2025-03-28 | End: 2025-04-07 | Stop reason: HOSPADM

## 2025-03-28 RX ADMIN — OXYCODONE 5 MG: 5 TABLET ORAL at 12:25

## 2025-03-28 RX ADMIN — OXYCODONE 5 MG: 5 TABLET ORAL at 17:47

## 2025-03-28 RX ADMIN — LEVOFLOXACIN 500 MG: 500 INJECTION, SOLUTION INTRAVENOUS at 00:28

## 2025-03-28 RX ADMIN — ASPIRIN 81 MG: 81 TABLET, CHEWABLE ORAL at 08:51

## 2025-03-28 RX ADMIN — ATORVASTATIN CALCIUM 80 MG: 80 TABLET, FILM COATED ORAL at 21:52

## 2025-03-28 RX ADMIN — ALTEPLASE 6 MG: 2.2 INJECTION, POWDER, LYOPHILIZED, FOR SOLUTION INTRAVENOUS at 10:00

## 2025-03-28 RX ADMIN — ACETAMINOPHEN 1000 MG: 500 TABLET ORAL at 12:24

## 2025-03-28 RX ADMIN — SODIUM CHLORIDE, PRESERVATIVE FREE 10 ML: 5 INJECTION INTRAVENOUS at 21:54

## 2025-03-28 RX ADMIN — METRONIDAZOLE 500 MG: 500 INJECTION, SOLUTION INTRAVENOUS at 12:13

## 2025-03-28 RX ADMIN — LINEZOLID 600 MG: 600 INJECTION, SOLUTION INTRAVENOUS at 13:56

## 2025-03-28 RX ADMIN — DORNASE ALFA 5 MG: 1 SOLUTION RESPIRATORY (INHALATION) at 10:00

## 2025-03-28 RX ADMIN — WATER 2000 MG: 1 INJECTION INTRAMUSCULAR; INTRAVENOUS; SUBCUTANEOUS at 12:02

## 2025-03-28 RX ADMIN — OXYCODONE 5 MG: 5 TABLET ORAL at 06:17

## 2025-03-28 RX ADMIN — OXYCODONE 5 MG: 5 TABLET ORAL at 00:22

## 2025-03-28 RX ADMIN — ACETAMINOPHEN 1000 MG: 500 TABLET ORAL at 18:36

## 2025-03-28 RX ADMIN — OXYCODONE 5 MG: 5 TABLET ORAL at 21:52

## 2025-03-28 ASSESSMENT — PAIN DESCRIPTION - DESCRIPTORS
DESCRIPTORS: STABBING;SHARP
DESCRIPTORS: JABBING;SHARP
DESCRIPTORS: JABBING;SHARP
DESCRIPTORS: SHARP
DESCRIPTORS: ACHING
DESCRIPTORS: ACHING

## 2025-03-28 ASSESSMENT — PAIN SCALES - GENERAL
PAINLEVEL_OUTOF10: 10
PAINLEVEL_OUTOF10: 10
PAINLEVEL_OUTOF10: 0
PAINLEVEL_OUTOF10: 10
PAINLEVEL_OUTOF10: 9
PAINLEVEL_OUTOF10: 10

## 2025-03-28 ASSESSMENT — PAIN - FUNCTIONAL ASSESSMENT
PAIN_FUNCTIONAL_ASSESSMENT: ACTIVITIES ARE NOT PREVENTED
PAIN_FUNCTIONAL_ASSESSMENT: ACTIVITIES ARE NOT PREVENTED

## 2025-03-28 ASSESSMENT — PAIN DESCRIPTION - ORIENTATION
ORIENTATION: LEFT;DISTAL
ORIENTATION: LEFT
ORIENTATION: LEFT;DISTAL
ORIENTATION: LEFT;DISTAL

## 2025-03-28 ASSESSMENT — PAIN DESCRIPTION - LOCATION
LOCATION: CHEST
LOCATION: FLANK;CHEST
LOCATION: CHEST
LOCATION: CHEST
LOCATION: FLANK
LOCATION: CHEST

## 2025-03-28 NOTE — CARE COORDINATION
3/28/25, 9:12 AM EDT    DISCHARGE ON GOING EVALUATION    Dona Kaur       [facility] day: 2  Location: -32/032-A Reason for admit: Elevated troponin [R79.89]  Pleural effusion [J90]  NSTEMI (non-ST elevated myocardial infarction) (HCC) [I21.4]     Procedures:   3/26 Ultrasound guided thoracentesis @ [facility]     3/27 ECHO EF 55-60%    3/27 CT guided chest tube placement per IR    Imaging since last note:   3/28 CXR nterval placement of a left-sided pigtail drainage catheter with a decrease in   size of the left pleural effusion.     Barriers to Discharge: CTS, Cardiology  following, chest tube maintenance, diabetes management, telemetry, Asa, IV Levaquin, pain and nausea control.    PCP: Larisa Abbasi APRN - CNP  Readmission Risk Score: 14.6    Patient Goals/Plan/Treatment Preferences: Pt from home alone. Denies needs at present. Will follow.

## 2025-03-29 ENCOUNTER — APPOINTMENT (OUTPATIENT)
Dept: GENERAL RADIOLOGY | Age: 65
DRG: 121 | End: 2025-03-29
Attending: INTERNAL MEDICINE
Payer: COMMERCIAL

## 2025-03-29 LAB
ANION GAP SERPL CALC-SCNC: 7 MEQ/L (ref 8–16)
BUN SERPL-MCNC: 28 MG/DL (ref 8–23)
CALCIUM SERPL-MCNC: 7.9 MG/DL (ref 8.8–10.2)
CHLORIDE SERPL-SCNC: 98 MEQ/L (ref 98–111)
CO2 SERPL-SCNC: 24 MEQ/L (ref 22–29)
CREAT SERPL-MCNC: 1.2 MG/DL (ref 0.7–1.2)
DEPRECATED RDW RBC AUTO: 47.6 FL (ref 35–45)
ERYTHROCYTE [DISTWIDTH] IN BLOOD BY AUTOMATED COUNT: 14.8 % (ref 11.5–14.5)
GFR SERPL CREATININE-BSD FRML MDRD: 68 ML/MIN/1.73M2
GLUCOSE BLD STRIP.AUTO-MCNC: 143 MG/DL (ref 70–108)
GLUCOSE BLD STRIP.AUTO-MCNC: 231 MG/DL (ref 70–108)
GLUCOSE BLD STRIP.AUTO-MCNC: 289 MG/DL (ref 70–108)
GLUCOSE BLD STRIP.AUTO-MCNC: 300 MG/DL (ref 70–108)
GLUCOSE SERPL-MCNC: 160 MG/DL (ref 74–109)
HCT VFR BLD AUTO: 27.4 % (ref 42–52)
HGB BLD-MCNC: 8.9 GM/DL (ref 14–18)
MCH RBC QN AUTO: 28.8 PG (ref 26–33)
MCHC RBC AUTO-ENTMCNC: 32.5 GM/DL (ref 32.2–35.5)
MCV RBC AUTO: 88.7 FL (ref 80–94)
PLATELET # BLD AUTO: 446 THOU/MM3 (ref 130–400)
PMV BLD AUTO: 9.6 FL (ref 9.4–12.4)
POTASSIUM SERPL-SCNC: 4.4 MEQ/L (ref 3.5–5.2)
RBC # BLD AUTO: 3.09 MILL/MM3 (ref 4.7–6.1)
SODIUM SERPL-SCNC: 129 MEQ/L (ref 135–145)
WBC # BLD AUTO: 15 THOU/MM3 (ref 4.8–10.8)

## 2025-03-29 PROCEDURE — 2500000003 HC RX 250 WO HCPCS: Performed by: PHYSICIAN ASSISTANT

## 2025-03-29 PROCEDURE — 71045 X-RAY EXAM CHEST 1 VIEW: CPT

## 2025-03-29 PROCEDURE — 6360000002 HC RX W HCPCS: Performed by: STUDENT IN AN ORGANIZED HEALTH CARE EDUCATION/TRAINING PROGRAM

## 2025-03-29 PROCEDURE — 80048 BASIC METABOLIC PNL TOTAL CA: CPT

## 2025-03-29 PROCEDURE — 82948 REAGENT STRIP/BLOOD GLUCOSE: CPT

## 2025-03-29 PROCEDURE — 99232 SBSQ HOSP IP/OBS MODERATE 35: CPT | Performed by: STUDENT IN AN ORGANIZED HEALTH CARE EDUCATION/TRAINING PROGRAM

## 2025-03-29 PROCEDURE — 6370000000 HC RX 637 (ALT 250 FOR IP)

## 2025-03-29 PROCEDURE — 2580000003 HC RX 258: Performed by: THORACIC SURGERY (CARDIOTHORACIC VASCULAR SURGERY)

## 2025-03-29 PROCEDURE — 2500000003 HC RX 250 WO HCPCS: Performed by: THORACIC SURGERY (CARDIOTHORACIC VASCULAR SURGERY)

## 2025-03-29 PROCEDURE — 36415 COLL VENOUS BLD VENIPUNCTURE: CPT

## 2025-03-29 PROCEDURE — 85027 COMPLETE CBC AUTOMATED: CPT

## 2025-03-29 PROCEDURE — 1200000003 HC TELEMETRY R&B

## 2025-03-29 PROCEDURE — 6370000000 HC RX 637 (ALT 250 FOR IP): Performed by: PHYSICIAN ASSISTANT

## 2025-03-29 PROCEDURE — 6360000002 HC RX W HCPCS: Performed by: THORACIC SURGERY (CARDIOTHORACIC VASCULAR SURGERY)

## 2025-03-29 PROCEDURE — 2500000003 HC RX 250 WO HCPCS

## 2025-03-29 PROCEDURE — 99232 SBSQ HOSP IP/OBS MODERATE 35: CPT | Performed by: THORACIC SURGERY (CARDIOTHORACIC VASCULAR SURGERY)

## 2025-03-29 PROCEDURE — 99233 SBSQ HOSP IP/OBS HIGH 50: CPT | Performed by: PHYSICIAN ASSISTANT

## 2025-03-29 PROCEDURE — 6360000002 HC RX W HCPCS: Performed by: PHYSICIAN ASSISTANT

## 2025-03-29 RX ORDER — POLYETHYLENE GLYCOL 3350 17 G/17G
17 POWDER, FOR SOLUTION ORAL DAILY
Status: DISCONTINUED | OUTPATIENT
Start: 2025-03-29 | End: 2025-04-07 | Stop reason: HOSPADM

## 2025-03-29 RX ORDER — LACTULOSE 10 G/15ML
20 SOLUTION ORAL 3 TIMES DAILY
Status: COMPLETED | OUTPATIENT
Start: 2025-03-29 | End: 2025-03-30

## 2025-03-29 RX ADMIN — METRONIDAZOLE 500 MG: 500 INJECTION, SOLUTION INTRAVENOUS at 23:36

## 2025-03-29 RX ADMIN — ASPIRIN 81 MG: 81 TABLET, CHEWABLE ORAL at 09:35

## 2025-03-29 RX ADMIN — LACTULOSE 20 G: 20 SOLUTION ORAL at 13:36

## 2025-03-29 RX ADMIN — OXYCODONE 5 MG: 5 TABLET ORAL at 06:16

## 2025-03-29 RX ADMIN — ACETAMINOPHEN 1000 MG: 500 TABLET ORAL at 13:36

## 2025-03-29 RX ADMIN — OXYCODONE 5 MG: 5 TABLET ORAL at 02:04

## 2025-03-29 RX ADMIN — POLYETHYLENE GLYCOL 3350 17 G: 17 POWDER, FOR SOLUTION ORAL at 13:36

## 2025-03-29 RX ADMIN — TRAZODONE HYDROCHLORIDE 50 MG: 50 TABLET ORAL at 23:33

## 2025-03-29 RX ADMIN — SODIUM CHLORIDE, PRESERVATIVE FREE 10 ML: 5 INJECTION INTRAVENOUS at 19:30

## 2025-03-29 RX ADMIN — ACETAMINOPHEN 1000 MG: 500 TABLET ORAL at 00:30

## 2025-03-29 RX ADMIN — LINEZOLID 600 MG: 600 INJECTION, SOLUTION INTRAVENOUS at 00:41

## 2025-03-29 RX ADMIN — OXYCODONE 5 MG: 5 TABLET ORAL at 10:12

## 2025-03-29 RX ADMIN — OXYCODONE 5 MG: 5 TABLET ORAL at 23:31

## 2025-03-29 RX ADMIN — DORNASE ALFA 5 MG: 1 SOLUTION RESPIRATORY (INHALATION) at 10:30

## 2025-03-29 RX ADMIN — ACETAMINOPHEN 1000 MG: 500 TABLET ORAL at 20:54

## 2025-03-29 RX ADMIN — METRONIDAZOLE 500 MG: 500 INJECTION, SOLUTION INTRAVENOUS at 00:36

## 2025-03-29 RX ADMIN — ALTEPLASE 6 MG: 2.2 INJECTION, POWDER, LYOPHILIZED, FOR SOLUTION INTRAVENOUS at 10:30

## 2025-03-29 RX ADMIN — OXYCODONE 5 MG: 5 TABLET ORAL at 14:53

## 2025-03-29 RX ADMIN — LINEZOLID 600 MG: 600 INJECTION, SOLUTION INTRAVENOUS at 13:44

## 2025-03-29 RX ADMIN — WATER 2000 MG: 1 INJECTION INTRAMUSCULAR; INTRAVENOUS; SUBCUTANEOUS at 11:49

## 2025-03-29 RX ADMIN — LACTULOSE 20 G: 20 SOLUTION ORAL at 19:34

## 2025-03-29 RX ADMIN — ATORVASTATIN CALCIUM 80 MG: 80 TABLET, FILM COATED ORAL at 19:28

## 2025-03-29 RX ADMIN — OXYCODONE 5 MG: 5 TABLET ORAL at 19:28

## 2025-03-29 RX ADMIN — METRONIDAZOLE 500 MG: 500 INJECTION, SOLUTION INTRAVENOUS at 11:58

## 2025-03-29 RX ADMIN — SODIUM CHLORIDE, PRESERVATIVE FREE 10 ML: 5 INJECTION INTRAVENOUS at 11:51

## 2025-03-29 RX ADMIN — ACETAMINOPHEN 1000 MG: 500 TABLET ORAL at 06:58

## 2025-03-29 ASSESSMENT — PAIN DESCRIPTION - LOCATION
LOCATION: CHEST
LOCATION: FLANK;CHEST
LOCATION: BACK;CHEST
LOCATION: CHEST

## 2025-03-29 ASSESSMENT — PAIN SCALES - GENERAL
PAINLEVEL_OUTOF10: 3
PAINLEVEL_OUTOF10: 5
PAINLEVEL_OUTOF10: 10
PAINLEVEL_OUTOF10: 0
PAINLEVEL_OUTOF10: 5
PAINLEVEL_OUTOF10: 4
PAINLEVEL_OUTOF10: 9
PAINLEVEL_OUTOF10: 10
PAINLEVEL_OUTOF10: 10
PAINLEVEL_OUTOF10: 7
PAINLEVEL_OUTOF10: 9
PAINLEVEL_OUTOF10: 10
PAINLEVEL_OUTOF10: 9
PAINLEVEL_OUTOF10: 3

## 2025-03-29 ASSESSMENT — PAIN DESCRIPTION - ORIENTATION
ORIENTATION: LEFT

## 2025-03-29 ASSESSMENT — PAIN DESCRIPTION - DESCRIPTORS
DESCRIPTORS: SHARP
DESCRIPTORS: SHARP

## 2025-03-30 ENCOUNTER — APPOINTMENT (OUTPATIENT)
Dept: GENERAL RADIOLOGY | Age: 65
DRG: 121 | End: 2025-03-30
Attending: INTERNAL MEDICINE
Payer: COMMERCIAL

## 2025-03-30 ENCOUNTER — RESULTS FOLLOW-UP (OUTPATIENT)
Dept: EMERGENCY DEPT | Age: 65
End: 2025-03-30

## 2025-03-30 LAB
ANION GAP SERPL CALC-SCNC: 6 MEQ/L (ref 8–16)
BACTERIA SPEC RESP CULT: NORMAL
BUN SERPL-MCNC: 28 MG/DL (ref 8–23)
CALCIUM SERPL-MCNC: 8.5 MG/DL (ref 8.8–10.2)
CHLORIDE SERPL-SCNC: 99 MEQ/L (ref 98–111)
CO2 SERPL-SCNC: 26 MEQ/L (ref 22–29)
CREAT SERPL-MCNC: 1.2 MG/DL (ref 0.7–1.2)
DEPRECATED RDW RBC AUTO: 49.1 FL (ref 35–45)
ERYTHROCYTE [DISTWIDTH] IN BLOOD BY AUTOMATED COUNT: 15.1 % (ref 11.5–14.5)
GFR SERPL CREATININE-BSD FRML MDRD: 68 ML/MIN/1.73M2
GLUCOSE BLD STRIP.AUTO-MCNC: 203 MG/DL (ref 70–108)
GLUCOSE BLD STRIP.AUTO-MCNC: 279 MG/DL (ref 70–108)
GLUCOSE SERPL-MCNC: 312 MG/DL (ref 74–109)
GRAM STN SPEC: NORMAL
HCT VFR BLD AUTO: 26.1 % (ref 42–52)
HGB BLD-MCNC: 8.3 GM/DL (ref 14–18)
MCH RBC QN AUTO: 28.5 PG (ref 26–33)
MCHC RBC AUTO-ENTMCNC: 31.8 GM/DL (ref 32.2–35.5)
MCV RBC AUTO: 89.7 FL (ref 80–94)
MICROORGANISM SPEC CULT: ABNORMAL
MICROORGANISM/AGENT SPEC: ABNORMAL
PLATELET # BLD AUTO: 464 THOU/MM3 (ref 130–400)
PMV BLD AUTO: 9.6 FL (ref 9.4–12.4)
POTASSIUM SERPL-SCNC: 4.6 MEQ/L (ref 3.5–5.2)
POTASSIUM SERPL-SCNC: 5.6 MEQ/L (ref 3.5–5.2)
RBC # BLD AUTO: 2.91 MILL/MM3 (ref 4.7–6.1)
SERVICE CMNT-IMP: ABNORMAL
SODIUM SERPL-SCNC: 131 MEQ/L (ref 135–145)
SPECIMEN DESCRIPTION: ABNORMAL
WBC # BLD AUTO: 15.3 THOU/MM3 (ref 4.8–10.8)

## 2025-03-30 PROCEDURE — 82948 REAGENT STRIP/BLOOD GLUCOSE: CPT

## 2025-03-30 PROCEDURE — 99232 SBSQ HOSP IP/OBS MODERATE 35: CPT | Performed by: THORACIC SURGERY (CARDIOTHORACIC VASCULAR SURGERY)

## 2025-03-30 PROCEDURE — 80048 BASIC METABOLIC PNL TOTAL CA: CPT

## 2025-03-30 PROCEDURE — 6370000000 HC RX 637 (ALT 250 FOR IP): Performed by: PHYSICIAN ASSISTANT

## 2025-03-30 PROCEDURE — 2500000003 HC RX 250 WO HCPCS

## 2025-03-30 PROCEDURE — 6360000002 HC RX W HCPCS: Performed by: STUDENT IN AN ORGANIZED HEALTH CARE EDUCATION/TRAINING PROGRAM

## 2025-03-30 PROCEDURE — 6360000002 HC RX W HCPCS: Performed by: PHYSICIAN ASSISTANT

## 2025-03-30 PROCEDURE — 2500000003 HC RX 250 WO HCPCS: Performed by: PHYSICIAN ASSISTANT

## 2025-03-30 PROCEDURE — 87899 AGENT NOS ASSAY W/OPTIC: CPT

## 2025-03-30 PROCEDURE — 99233 SBSQ HOSP IP/OBS HIGH 50: CPT | Performed by: PHYSICIAN ASSISTANT

## 2025-03-30 PROCEDURE — 87081 CULTURE SCREEN ONLY: CPT

## 2025-03-30 PROCEDURE — 6370000000 HC RX 637 (ALT 250 FOR IP)

## 2025-03-30 PROCEDURE — 87449 NOS EACH ORGANISM AG IA: CPT

## 2025-03-30 PROCEDURE — 6360000002 HC RX W HCPCS: Performed by: THORACIC SURGERY (CARDIOTHORACIC VASCULAR SURGERY)

## 2025-03-30 PROCEDURE — 99232 SBSQ HOSP IP/OBS MODERATE 35: CPT | Performed by: STUDENT IN AN ORGANIZED HEALTH CARE EDUCATION/TRAINING PROGRAM

## 2025-03-30 PROCEDURE — 6370000000 HC RX 637 (ALT 250 FOR IP): Performed by: STUDENT IN AN ORGANIZED HEALTH CARE EDUCATION/TRAINING PROGRAM

## 2025-03-30 PROCEDURE — 85027 COMPLETE CBC AUTOMATED: CPT

## 2025-03-30 PROCEDURE — 87385 HISTOPLASMA CAPSUL AG IA: CPT

## 2025-03-30 PROCEDURE — 2500000003 HC RX 250 WO HCPCS: Performed by: THORACIC SURGERY (CARDIOTHORACIC VASCULAR SURGERY)

## 2025-03-30 PROCEDURE — 74018 RADEX ABDOMEN 1 VIEW: CPT

## 2025-03-30 PROCEDURE — 84132 ASSAY OF SERUM POTASSIUM: CPT

## 2025-03-30 PROCEDURE — 36415 COLL VENOUS BLD VENIPUNCTURE: CPT

## 2025-03-30 PROCEDURE — 6360000002 HC RX W HCPCS

## 2025-03-30 PROCEDURE — 71045 X-RAY EXAM CHEST 1 VIEW: CPT

## 2025-03-30 PROCEDURE — 2580000003 HC RX 258: Performed by: THORACIC SURGERY (CARDIOTHORACIC VASCULAR SURGERY)

## 2025-03-30 PROCEDURE — 1200000003 HC TELEMETRY R&B

## 2025-03-30 RX ORDER — METRONIDAZOLE 500 MG/1
500 TABLET ORAL EVERY 12 HOURS SCHEDULED
Status: DISCONTINUED | OUTPATIENT
Start: 2025-03-30 | End: 2025-04-04

## 2025-03-30 RX ORDER — LINEZOLID 600 MG/1
600 TABLET, FILM COATED ORAL EVERY 12 HOURS SCHEDULED
Status: DISCONTINUED | OUTPATIENT
Start: 2025-03-30 | End: 2025-04-04

## 2025-03-30 RX ORDER — DOCUSATE SODIUM 100 MG/1
100 CAPSULE, LIQUID FILLED ORAL 2 TIMES DAILY
Status: DISCONTINUED | OUTPATIENT
Start: 2025-03-30 | End: 2025-04-07 | Stop reason: HOSPADM

## 2025-03-30 RX ADMIN — ACETAMINOPHEN 1000 MG: 500 TABLET ORAL at 11:23

## 2025-03-30 RX ADMIN — METRONIDAZOLE 500 MG: 500 TABLET ORAL at 19:41

## 2025-03-30 RX ADMIN — LACTULOSE 20 G: 20 SOLUTION ORAL at 08:28

## 2025-03-30 RX ADMIN — ALTEPLASE 6 MG: 2.2 INJECTION, POWDER, LYOPHILIZED, FOR SOLUTION INTRAVENOUS at 11:23

## 2025-03-30 RX ADMIN — SODIUM CHLORIDE, PRESERVATIVE FREE 10 ML: 5 INJECTION INTRAVENOUS at 19:41

## 2025-03-30 RX ADMIN — OXYCODONE 5 MG: 5 TABLET ORAL at 03:59

## 2025-03-30 RX ADMIN — DOCUSATE SODIUM 100 MG: 100 CAPSULE, LIQUID FILLED ORAL at 19:41

## 2025-03-30 RX ADMIN — OXYCODONE 5 MG: 5 TABLET ORAL at 15:29

## 2025-03-30 RX ADMIN — WATER 2000 MG: 1 INJECTION INTRAMUSCULAR; INTRAVENOUS; SUBCUTANEOUS at 11:28

## 2025-03-30 RX ADMIN — ACETAMINOPHEN 1000 MG: 500 TABLET ORAL at 06:39

## 2025-03-30 RX ADMIN — LINEZOLID 600 MG: 600 INJECTION, SOLUTION INTRAVENOUS at 00:42

## 2025-03-30 RX ADMIN — SODIUM CHLORIDE, PRESERVATIVE FREE 10 ML: 5 INJECTION INTRAVENOUS at 08:29

## 2025-03-30 RX ADMIN — DOCUSATE SODIUM 100 MG: 100 CAPSULE, LIQUID FILLED ORAL at 11:23

## 2025-03-30 RX ADMIN — ATORVASTATIN CALCIUM 80 MG: 80 TABLET, FILM COATED ORAL at 19:41

## 2025-03-30 RX ADMIN — METRONIDAZOLE 500 MG: 500 TABLET ORAL at 10:40

## 2025-03-30 RX ADMIN — LINEZOLID 600 MG: 600 TABLET, FILM COATED ORAL at 10:40

## 2025-03-30 RX ADMIN — ONDANSETRON 4 MG: 2 INJECTION, SOLUTION INTRAMUSCULAR; INTRAVENOUS at 20:48

## 2025-03-30 RX ADMIN — ASPIRIN 81 MG: 81 TABLET, CHEWABLE ORAL at 08:28

## 2025-03-30 RX ADMIN — OXYCODONE 5 MG: 5 TABLET ORAL at 08:28

## 2025-03-30 RX ADMIN — DORNASE ALFA 5 MG: 1 SOLUTION RESPIRATORY (INHALATION) at 11:24

## 2025-03-30 RX ADMIN — ACETAMINOPHEN 1000 MG: 500 TABLET ORAL at 17:50

## 2025-03-30 RX ADMIN — LINEZOLID 600 MG: 600 TABLET, FILM COATED ORAL at 19:41

## 2025-03-30 RX ADMIN — POLYETHYLENE GLYCOL 3350 17 G: 17 POWDER, FOR SOLUTION ORAL at 08:28

## 2025-03-30 RX ADMIN — OXYCODONE 5 MG: 5 TABLET ORAL at 19:40

## 2025-03-30 ASSESSMENT — PAIN SCALES - GENERAL
PAINLEVEL_OUTOF10: 6
PAINLEVEL_OUTOF10: 10
PAINLEVEL_OUTOF10: 10
PAINLEVEL_OUTOF10: 6
PAINLEVEL_OUTOF10: 8
PAINLEVEL_OUTOF10: 7
PAINLEVEL_OUTOF10: 8
PAINLEVEL_OUTOF10: 8
PAINLEVEL_OUTOF10: 2

## 2025-03-30 ASSESSMENT — PAIN DESCRIPTION - ORIENTATION
ORIENTATION: LEFT

## 2025-03-30 ASSESSMENT — PAIN DESCRIPTION - FREQUENCY: FREQUENCY: CONTINUOUS

## 2025-03-30 ASSESSMENT — PAIN DESCRIPTION - LOCATION
LOCATION: CHEST;BACK
LOCATION: BACK
LOCATION: BACK;CHEST

## 2025-03-30 ASSESSMENT — PAIN DESCRIPTION - DESCRIPTORS
DESCRIPTORS: SHARP
DESCRIPTORS: SHARP

## 2025-03-30 ASSESSMENT — PAIN DESCRIPTION - PAIN TYPE: TYPE: ACUTE PAIN

## 2025-03-31 ENCOUNTER — APPOINTMENT (OUTPATIENT)
Dept: GENERAL RADIOLOGY | Age: 65
DRG: 121 | End: 2025-03-31
Attending: INTERNAL MEDICINE
Payer: COMMERCIAL

## 2025-03-31 LAB
GLUCOSE BLD STRIP.AUTO-MCNC: 138 MG/DL (ref 70–108)
L PNEUMO1 AG UR QL IA.RAPID: NEGATIVE
STREP PNEUMO AG, UR: NEGATIVE

## 2025-03-31 PROCEDURE — 6360000002 HC RX W HCPCS

## 2025-03-31 PROCEDURE — 36415 COLL VENOUS BLD VENIPUNCTURE: CPT

## 2025-03-31 PROCEDURE — 1200000003 HC TELEMETRY R&B

## 2025-03-31 PROCEDURE — 32562 LYSE CHEST FIBRIN SUBQ DAY: CPT | Performed by: PHYSICIAN ASSISTANT

## 2025-03-31 PROCEDURE — 2500000003 HC RX 250 WO HCPCS: Performed by: PHYSICIAN ASSISTANT

## 2025-03-31 PROCEDURE — 2580000003 HC RX 258: Performed by: PHYSICIAN ASSISTANT

## 2025-03-31 PROCEDURE — 99232 SBSQ HOSP IP/OBS MODERATE 35: CPT | Performed by: STUDENT IN AN ORGANIZED HEALTH CARE EDUCATION/TRAINING PROGRAM

## 2025-03-31 PROCEDURE — 6370000000 HC RX 637 (ALT 250 FOR IP): Performed by: STUDENT IN AN ORGANIZED HEALTH CARE EDUCATION/TRAINING PROGRAM

## 2025-03-31 PROCEDURE — 82948 REAGENT STRIP/BLOOD GLUCOSE: CPT

## 2025-03-31 PROCEDURE — 6360000002 HC RX W HCPCS: Performed by: PHYSICIAN ASSISTANT

## 2025-03-31 PROCEDURE — 6370000000 HC RX 637 (ALT 250 FOR IP)

## 2025-03-31 PROCEDURE — APPSS30 APP SPLIT SHARED TIME 16-30 MINUTES: Performed by: PHYSICIAN ASSISTANT

## 2025-03-31 PROCEDURE — 71045 X-RAY EXAM CHEST 1 VIEW: CPT

## 2025-03-31 PROCEDURE — 2500000003 HC RX 250 WO HCPCS

## 2025-03-31 PROCEDURE — 6370000000 HC RX 637 (ALT 250 FOR IP): Performed by: PHYSICIAN ASSISTANT

## 2025-03-31 PROCEDURE — 86480 TB TEST CELL IMMUN MEASURE: CPT

## 2025-03-31 PROCEDURE — 99233 SBSQ HOSP IP/OBS HIGH 50: CPT | Performed by: PHYSICIAN ASSISTANT

## 2025-03-31 RX ORDER — PROCHLORPERAZINE EDISYLATE 5 MG/ML
10 INJECTION INTRAMUSCULAR; INTRAVENOUS EVERY 6 HOURS PRN
Status: DISCONTINUED | OUTPATIENT
Start: 2025-03-31 | End: 2025-04-07 | Stop reason: HOSPADM

## 2025-03-31 RX ADMIN — OXYCODONE 5 MG: 5 TABLET ORAL at 12:19

## 2025-03-31 RX ADMIN — DORNASE ALFA 5 MG: 1 SOLUTION RESPIRATORY (INHALATION) at 10:54

## 2025-03-31 RX ADMIN — ATORVASTATIN CALCIUM 80 MG: 80 TABLET, FILM COATED ORAL at 20:37

## 2025-03-31 RX ADMIN — LINEZOLID 600 MG: 600 TABLET, FILM COATED ORAL at 20:37

## 2025-03-31 RX ADMIN — TRAZODONE HYDROCHLORIDE 50 MG: 50 TABLET ORAL at 00:27

## 2025-03-31 RX ADMIN — METRONIDAZOLE 500 MG: 500 TABLET ORAL at 20:37

## 2025-03-31 RX ADMIN — ALTEPLASE 6 MG: 2.2 INJECTION, POWDER, LYOPHILIZED, FOR SOLUTION INTRAVENOUS at 10:53

## 2025-03-31 RX ADMIN — OXYCODONE 5 MG: 5 TABLET ORAL at 08:29

## 2025-03-31 RX ADMIN — OXYCODONE 5 MG: 5 TABLET ORAL at 23:41

## 2025-03-31 RX ADMIN — OXYCODONE 5 MG: 5 TABLET ORAL at 04:31

## 2025-03-31 RX ADMIN — WATER 2000 MG: 1 INJECTION INTRAMUSCULAR; INTRAVENOUS; SUBCUTANEOUS at 12:10

## 2025-03-31 RX ADMIN — PROCHLORPERAZINE EDISYLATE 10 MG: 5 INJECTION INTRAMUSCULAR; INTRAVENOUS at 12:21

## 2025-03-31 RX ADMIN — DOCUSATE SODIUM 100 MG: 100 CAPSULE, LIQUID FILLED ORAL at 20:37

## 2025-03-31 RX ADMIN — OXYCODONE 5 MG: 5 TABLET ORAL at 00:27

## 2025-03-31 RX ADMIN — SODIUM CHLORIDE, PRESERVATIVE FREE 10 ML: 5 INJECTION INTRAVENOUS at 20:38

## 2025-03-31 RX ADMIN — OXYCODONE 5 MG: 5 TABLET ORAL at 18:09

## 2025-03-31 RX ADMIN — ONDANSETRON 4 MG: 2 INJECTION, SOLUTION INTRAMUSCULAR; INTRAVENOUS at 08:53

## 2025-03-31 RX ADMIN — SODIUM CHLORIDE, PRESERVATIVE FREE 10 ML: 5 INJECTION INTRAVENOUS at 09:28

## 2025-03-31 ASSESSMENT — PAIN DESCRIPTION - DESCRIPTORS
DESCRIPTORS: ACHING;DISCOMFORT
DESCRIPTORS: SHARP
DESCRIPTORS: DISCOMFORT
DESCRIPTORS: SHARP;ACHING
DESCRIPTORS: ACHING;DISCOMFORT

## 2025-03-31 ASSESSMENT — PAIN DESCRIPTION - ORIENTATION
ORIENTATION: LEFT

## 2025-03-31 ASSESSMENT — PAIN DESCRIPTION - LOCATION
LOCATION: RIB CAGE
LOCATION: BACK;CHEST
LOCATION: RIB CAGE
LOCATION: BACK;CHEST
LOCATION: RIB CAGE
LOCATION: BACK;CHEST
LOCATION: RIB CAGE

## 2025-03-31 ASSESSMENT — PAIN SCALES - GENERAL
PAINLEVEL_OUTOF10: 7
PAINLEVEL_OUTOF10: 8
PAINLEVEL_OUTOF10: 9
PAINLEVEL_OUTOF10: 10
PAINLEVEL_OUTOF10: 8
PAINLEVEL_OUTOF10: 10
PAINLEVEL_OUTOF10: 6
PAINLEVEL_OUTOF10: 10

## 2025-03-31 ASSESSMENT — PAIN DESCRIPTION - PAIN TYPE
TYPE: ACUTE PAIN
TYPE: ACUTE PAIN

## 2025-04-01 ENCOUNTER — APPOINTMENT (OUTPATIENT)
Dept: CT IMAGING | Age: 65
DRG: 121 | End: 2025-04-01
Attending: INTERNAL MEDICINE
Payer: COMMERCIAL

## 2025-04-01 ENCOUNTER — APPOINTMENT (OUTPATIENT)
Dept: GENERAL RADIOLOGY | Age: 65
DRG: 121 | End: 2025-04-01
Attending: INTERNAL MEDICINE
Payer: COMMERCIAL

## 2025-04-01 LAB
ANION GAP SERPL CALC-SCNC: 5 MEQ/L (ref 8–16)
APTT PPP: 31.2 SECONDS (ref 22–38)
BASOPHILS ABSOLUTE: 0.1 THOU/MM3 (ref 0–0.1)
BASOPHILS NFR BLD AUTO: 0.6 %
BUN SERPL-MCNC: 25 MG/DL (ref 8–23)
CALCIUM SERPL-MCNC: 8 MG/DL (ref 8.8–10.2)
CHLORIDE SERPL-SCNC: 96 MEQ/L (ref 98–111)
CO2 SERPL-SCNC: 29 MEQ/L (ref 22–29)
CREAT SERPL-MCNC: 1.3 MG/DL (ref 0.7–1.2)
DEPRECATED RDW RBC AUTO: 49.6 FL (ref 35–45)
EOSINOPHIL NFR BLD AUTO: 2.1 %
EOSINOPHILS ABSOLUTE: 0.3 THOU/MM3 (ref 0–0.4)
ERYTHROCYTE [DISTWIDTH] IN BLOOD BY AUTOMATED COUNT: 15.6 % (ref 11.5–14.5)
GFR SERPL CREATININE-BSD FRML MDRD: 62 ML/MIN/1.73M2
GLUCOSE SERPL-MCNC: 266 MG/DL (ref 74–109)
HCT VFR BLD AUTO: 29.7 % (ref 42–52)
HGB BLD-MCNC: 9.4 GM/DL (ref 14–18)
IMM GRANULOCYTES # BLD AUTO: 0.12 THOU/MM3 (ref 0–0.07)
IMM GRANULOCYTES NFR BLD AUTO: 1 %
INR PPP: 1.16 (ref 0.85–1.13)
LYMPHOCYTES ABSOLUTE: 0.9 THOU/MM3 (ref 1–4.8)
LYMPHOCYTES NFR BLD AUTO: 7.1 %
MCH RBC QN AUTO: 27.6 PG (ref 26–33)
MCHC RBC AUTO-ENTMCNC: 31.6 GM/DL (ref 32.2–35.5)
MCV RBC AUTO: 87.4 FL (ref 80–94)
MONOCYTES ABSOLUTE: 1.1 THOU/MM3 (ref 0.4–1.3)
MONOCYTES NFR BLD AUTO: 8.6 %
MRSA SPEC QL CULT: NORMAL
NEUTROPHILS ABSOLUTE: 10.2 THOU/MM3 (ref 1.8–7.7)
NEUTROPHILS NFR BLD AUTO: 80.6 %
NRBC BLD AUTO-RTO: 0 /100 WBC
PLATELET # BLD AUTO: 606 THOU/MM3 (ref 130–400)
PMV BLD AUTO: 9.1 FL (ref 9.4–12.4)
POTASSIUM SERPL-SCNC: 5.5 MEQ/L (ref 3.5–5.2)
RBC # BLD AUTO: 3.4 MILL/MM3 (ref 4.7–6.1)
SODIUM SERPL-SCNC: 130 MEQ/L (ref 135–145)
WBC # BLD AUTO: 12.6 THOU/MM3 (ref 4.8–10.8)

## 2025-04-01 PROCEDURE — 71045 X-RAY EXAM CHEST 1 VIEW: CPT

## 2025-04-01 PROCEDURE — 6370000000 HC RX 637 (ALT 250 FOR IP)

## 2025-04-01 PROCEDURE — 80048 BASIC METABOLIC PNL TOTAL CA: CPT

## 2025-04-01 PROCEDURE — 6360000004 HC RX CONTRAST MEDICATION: Performed by: PHYSICIAN ASSISTANT

## 2025-04-01 PROCEDURE — 6370000000 HC RX 637 (ALT 250 FOR IP): Performed by: STUDENT IN AN ORGANIZED HEALTH CARE EDUCATION/TRAINING PROGRAM

## 2025-04-01 PROCEDURE — 99223 1ST HOSP IP/OBS HIGH 75: CPT | Performed by: INTERNAL MEDICINE

## 2025-04-01 PROCEDURE — APPSS30 APP SPLIT SHARED TIME 16-30 MINUTES: Performed by: PHYSICIAN ASSISTANT

## 2025-04-01 PROCEDURE — 36415 COLL VENOUS BLD VENIPUNCTURE: CPT

## 2025-04-01 PROCEDURE — 1200000003 HC TELEMETRY R&B

## 2025-04-01 PROCEDURE — 2500000003 HC RX 250 WO HCPCS: Performed by: PHYSICIAN ASSISTANT

## 2025-04-01 PROCEDURE — 85025 COMPLETE CBC W/AUTO DIFF WBC: CPT

## 2025-04-01 PROCEDURE — 6370000000 HC RX 637 (ALT 250 FOR IP): Performed by: PHYSICIAN ASSISTANT

## 2025-04-01 PROCEDURE — 6360000002 HC RX W HCPCS: Performed by: PHYSICIAN ASSISTANT

## 2025-04-01 PROCEDURE — 2500000003 HC RX 250 WO HCPCS

## 2025-04-01 PROCEDURE — 71260 CT THORAX DX C+: CPT

## 2025-04-01 PROCEDURE — 85610 PROTHROMBIN TIME: CPT

## 2025-04-01 PROCEDURE — 99233 SBSQ HOSP IP/OBS HIGH 50: CPT | Performed by: PHYSICIAN ASSISTANT

## 2025-04-01 PROCEDURE — 85730 THROMBOPLASTIN TIME PARTIAL: CPT

## 2025-04-01 PROCEDURE — 6360000002 HC RX W HCPCS

## 2025-04-01 PROCEDURE — 99232 SBSQ HOSP IP/OBS MODERATE 35: CPT | Performed by: STUDENT IN AN ORGANIZED HEALTH CARE EDUCATION/TRAINING PROGRAM

## 2025-04-01 RX ORDER — KETOROLAC TROMETHAMINE 30 MG/ML
30 INJECTION, SOLUTION INTRAMUSCULAR; INTRAVENOUS EVERY 6 HOURS PRN
Status: DISCONTINUED | OUTPATIENT
Start: 2025-04-01 | End: 2025-04-03

## 2025-04-01 RX ORDER — IOPAMIDOL 755 MG/ML
80 INJECTION, SOLUTION INTRAVASCULAR
Status: COMPLETED | OUTPATIENT
Start: 2025-04-01 | End: 2025-04-01

## 2025-04-01 RX ADMIN — LINEZOLID 600 MG: 600 TABLET, FILM COATED ORAL at 20:13

## 2025-04-01 RX ADMIN — OXYCODONE 5 MG: 5 TABLET ORAL at 12:37

## 2025-04-01 RX ADMIN — WATER 2000 MG: 1 INJECTION INTRAMUSCULAR; INTRAVENOUS; SUBCUTANEOUS at 11:12

## 2025-04-01 RX ADMIN — METRONIDAZOLE 500 MG: 500 TABLET ORAL at 08:07

## 2025-04-01 RX ADMIN — OXYCODONE 5 MG: 5 TABLET ORAL at 21:22

## 2025-04-01 RX ADMIN — LINEZOLID 600 MG: 600 TABLET, FILM COATED ORAL at 08:06

## 2025-04-01 RX ADMIN — ASPIRIN 81 MG: 81 TABLET, CHEWABLE ORAL at 08:07

## 2025-04-01 RX ADMIN — SODIUM CHLORIDE, PRESERVATIVE FREE 10 ML: 5 INJECTION INTRAVENOUS at 20:13

## 2025-04-01 RX ADMIN — ATORVASTATIN CALCIUM 80 MG: 80 TABLET, FILM COATED ORAL at 20:12

## 2025-04-01 RX ADMIN — OXYCODONE 5 MG: 5 TABLET ORAL at 08:07

## 2025-04-01 RX ADMIN — ONDANSETRON 4 MG: 2 INJECTION, SOLUTION INTRAMUSCULAR; INTRAVENOUS at 08:12

## 2025-04-01 RX ADMIN — DOCUSATE SODIUM 100 MG: 100 CAPSULE, LIQUID FILLED ORAL at 20:13

## 2025-04-01 RX ADMIN — IOPAMIDOL 80 ML: 755 INJECTION, SOLUTION INTRAVENOUS at 10:04

## 2025-04-01 RX ADMIN — KETOROLAC TROMETHAMINE 30 MG: 30 INJECTION, SOLUTION INTRAMUSCULAR at 18:33

## 2025-04-01 RX ADMIN — OXYCODONE 5 MG: 5 TABLET ORAL at 17:02

## 2025-04-01 RX ADMIN — OXYCODONE 5 MG: 5 TABLET ORAL at 03:49

## 2025-04-01 RX ADMIN — METRONIDAZOLE 500 MG: 500 TABLET ORAL at 20:13

## 2025-04-01 ASSESSMENT — PAIN DESCRIPTION - ORIENTATION
ORIENTATION: LEFT

## 2025-04-01 ASSESSMENT — PAIN DESCRIPTION - DESCRIPTORS
DESCRIPTORS: ACHING;DISCOMFORT
DESCRIPTORS: ACHING;DISCOMFORT
DESCRIPTORS: DISCOMFORT;ACHING

## 2025-04-01 ASSESSMENT — PAIN DESCRIPTION - LOCATION
LOCATION: RIB CAGE

## 2025-04-01 ASSESSMENT — PAIN SCALES - GENERAL
PAINLEVEL_OUTOF10: 0
PAINLEVEL_OUTOF10: 3
PAINLEVEL_OUTOF10: 9
PAINLEVEL_OUTOF10: 8
PAINLEVEL_OUTOF10: 7
PAINLEVEL_OUTOF10: 0
PAINLEVEL_OUTOF10: 2

## 2025-04-01 ASSESSMENT — PAIN SCALES - WONG BAKER: WONGBAKER_NUMERICALRESPONSE: NO HURT

## 2025-04-01 NOTE — CONSULTS
Hx and P/E :Infectious D.       Patient - Dona Kaur,  Age - 64 y.o.    - 1960      Room Number - 8B-32/032-A   N -  [id number]   [facility] # - [id number]  Date of Admission -  3/26/2025  6:54 PM  Patient's PCP: Larisa Abbasi APRN - CNP     Requesting Physician: Katharine Leiva PA    CHIEF COMPLAINT:     Empyema    ASSESSMENT AND PLAN     Patient is a 64-year-old male who I am consulted for empyema management.    CTA chest from  with evidence of a loculated left pleural effusion.  Due to multiple loculations and thick fluid, CT-guided chest tube placed on .  Initial fluid cultures from pleura thus far with no growth though specimen obtained via thoracentesis at outside hospital with gram-positive cocci in clusters.  I would recommend continuing anaerobic coverage with metronidazole and utilizing both linezolid and ceftriaxone until this is determined to be MSSA versus MRSA.  I will call daily for updates from [place] to determine if new growth present from cultures.    Continue therapy with linezolid 600 mg twice daily  Ceftriaxone 2 g daily  Metronidazole 500 mg 2 times daily  If drain output decreases, may need adjustment of chest tube  Will check daily on cultures obtained at [place]  Duration of therapy will be largely based on imaging  Empyema duration is generally 4 to 6 weeks of IV versus oral antimicrobial      HISTORY OF PRESENT ILLNESS       This is a very pleasant 64 y.o. male who was admitted to the hospital with a chief complaints of left-sided chest discomfort.  Infectious disease consulted given concerns for parenchymal lung consolidation.    Patient states that he originally started having left-sided posterior chest discomfort approximately 1 week prior to admission.  He notes that he had an episode of pneumonia approximately 3 to 4 weeks ago and had been noticing some discomfort since then.  He states that he was not treated with antibiotics.  
  CT/CV Surgery Consult Note    3/27/2025 11:30 AM  Surgeon: Dr. Rose    Reason for Consult: Left pleural effusion    HPI:    Mr. Kaur  is a 64 year old with hx of IDDM Type I, HTN, and Tobacco Abuse who presented to [facility] with complaint of cough and SOB.  Retrospectively, he states he had pneumonia around 5-6 weeks ago with productive cough and fever at that time.  He took Zpack back then and felt a little better, but within 2 weeks he started feeling fatigue, cough, and SOB.  He denies any hx of MI or CHF.  He denies any hx of fever, chills, or sweats at this time.  He has been through 2 thoracentesis with 450 and 50 cc removed, respectively.    Vital Signs: /73   Pulse 98   Temp 98.1 °F (36.7 °C) (Oral)   Resp 20   Ht 1.803 m (5' 11\")   Wt 68.7 kg (151 lb 6.4 oz)   SpO2 98%   BMI 21.12 kg/m²    Temp (24hrs), Av.9 °F (37.2 °C), Min:98.1 °F (36.7 °C), Max:100.6 °F (38.1 °C)      Labs:   CBC:  Recent Labs     25  0845 25  1345 25   WBC 10.8  --  11.6*   HGB 10.2*  --  9.5*   HCT 31.2*  --  29.4*   MCV 89.1  --  88.3     --  404*   APTT  --  30.3 31.3   PROTIME 13.2  --   --    INR 1.0  --  1.18*     BMP:   Recent Labs     25  2045 25  0734 25  0846 25  0944   * 130*  --  135   K 4.8 6.0*  --  5.2    99  --  102   CO2 25 23  --  24   BUN 24* 28*  --  30*   CREATININE 1.1 1.5*  --  1.7*   MG 2.0  --   --   --    POCGLU  --   --  215*  --      Last HgA1C:   Lab Results   Component Value Date    LABA1C 8.7 (H) 2025     Imaging:  CTA Chest: 3/26/25  IMPRESSION:  1. No evidence of thoracic or abdominal aortic aneurysm or dissection.  2. Moderate loculated left pleural effusion with adjacent atelectasis and  fluid in the left fissure.  Follow-up to resolution to exclude underlying  nodule or mass without separate isolated nodule clearly evident on the  current exam however largely atelectatic left lung adjacent  3. Hepatic 
  [facility]      Patient - Dona Kaur   MRN -  [id number]   [facility] # - [id number]   - 1960      Date of Admission -  3/26/2025  6:54 PM  Date of evaluation -  2025  Room - 8B-32/032-A   Hospital Day - 6  Consulting - Katharine Leiva PA Primary Care Physician - Larisa Abbasi APRN - AMIRAH     Problem List      Active Hospital Problems    Diagnosis Date Noted    Empyema (HCC) [J86.9] 2025    Acute coronary syndrome (HCC) [I24.9] 2025    Pneumonia of left lung due to infectious organism [J18.9] 2025    Penicillin allergy [Z88.0] 2025    Loculated pleural effusion [J90] 2025    SOB (shortness of breath) [R06.02] 2025    NSTEMI (non-ST elevated myocardial infarction) (HCC) [I21.4] 2025     Reason for Consult    Left pleural effusion left. Need for Bronch  HPI   History Obtained From: Patient  and electronic medical record.    Dona Kaur is a 64 y.o. male with pmhx of  HTN, IDDM2 who presented to [facility] for evaluation of left-sided chest pain.  Patient stated that on 3/27 he developed left-sided chest pain was given pain medicine and sent home after presenting ED.  However on Del 3/30 he he noted his left-sided chest pain, worse and could not tolerate the pain and decided to be checked at ED.  CTA on 3/26 noted mod loculated left pleural effusion with adjacent atelectasis and fluid in left fissure. Had thoracentesis done on 3/27 at [facility] prior transfer. Had another thoracentesis on admission which removed little due to viscosity. Repeat CXR noted persistent left sided effusion and chest tube placed on 3/27 by CTS. Serial daily CXR noted persistent left sided pleural effusion. Pigtail catheter placed on 3/27 & tpaDNAse and pt completed 4 doses.   The Thoracentesis BF from [place] grew GPC in clutters and zyvox, flagyl and CTX were initiated with ID consult.   Pulmonology was consulted on  for bronchoscopy 
  Diagnosis Date    Diabetes mellitus (HCC)     Hypertension      History reviewed. No pertinent surgical history.  Current Facility-Administered Medications   Medication Dose Route Frequency Provider Last Rate Last Admin    levoFLOXacin (LEVAQUIN) 750 MG/150ML infusion 750 mg  750 mg IntraVENous Q24H Linnette Nettles APRN - CNP   Stopped at 03/27/25 0333    sodium chloride flush 0.9 % injection 5-40 mL  5-40 mL IntraVENous 2 times per day Linnette Nettles, APRN - CNP   10 mL at 03/27/25 0749    sodium chloride flush 0.9 % injection 5-40 mL  5-40 mL IntraVENous PRN Jordana Nettlese PEDRITO, APRN - CNP        0.9 % sodium chloride infusion   IntraVENous PRN Linnette Nettles, APRN - CNP        potassium chloride (KLOR-CON M) extended release tablet 40 mEq  40 mEq Oral PRN Ronald Nettleslie A APRN - CNP        Or    potassium bicarb-citric acid (EFFER-K) effervescent tablet 40 mEq  40 mEq Oral PRN Jordana Nettlese A, APRN - CNP        Or    potassium chloride 10 mEq/100 mL IVPB (Peripheral Line)  10 mEq IntraVENous PRN Jordana Nettlese A, APRN - CNP        magnesium sulfate 2000 mg in 50 mL IVPB premix  2,000 mg IntraVENous PRN Linnette Nettles APRN - CNP        ondansetron (ZOFRAN-ODT) disintegrating tablet 4 mg  4 mg Oral Q8H PRN Jordana Nettlese PEDRITO APRN - CNP        Or    ondansetron (ZOFRAN) injection 4 mg  4 mg IntraVENous Q6H PRN Linnette Nettles APRN - CNP   4 mg at 03/27/25 0350    acetaminophen (TYLENOL) tablet 650 mg  650 mg Oral Q6H PRN Jordana Nettlese PEDRITO, APRN - CNP   650 mg at 03/26/25 2130    Or    acetaminophen (TYLENOL) suppository 650 mg  650 mg Rectal Q6H PRN Jordana Nettlese A, APRN - CNP        polyethylene glycol (GLYCOLAX) packet 17 g  17 g Oral Daily PRN Linnette Nettles APRN - CNP        aspirin chewable tablet 81 mg  81 mg Oral Daily Longmeier, AZALEA Stock CNP   81 mg at 03/27/25 0750    atorvastatin (LIPITOR) tablet 80 mg  80 mg Oral Nightly Linnette Nettles APRN - CNP   80 mg

## 2025-04-02 ENCOUNTER — APPOINTMENT (OUTPATIENT)
Dept: GENERAL RADIOLOGY | Age: 65
DRG: 121 | End: 2025-04-02
Attending: INTERNAL MEDICINE
Payer: COMMERCIAL

## 2025-04-02 ENCOUNTER — ANESTHESIA (OUTPATIENT)
Dept: ENDOSCOPY | Age: 65
End: 2025-04-02
Payer: COMMERCIAL

## 2025-04-02 ENCOUNTER — ANESTHESIA EVENT (OUTPATIENT)
Dept: ENDOSCOPY | Age: 65
End: 2025-04-02
Payer: COMMERCIAL

## 2025-04-02 ENCOUNTER — ANESTHESIA EVENT (OUTPATIENT)
Dept: OPERATING ROOM | Age: 65
End: 2025-04-02
Payer: COMMERCIAL

## 2025-04-02 ENCOUNTER — APPOINTMENT (OUTPATIENT)
Dept: ENDOSCOPY | Age: 65
DRG: 121 | End: 2025-04-02
Attending: INTERNAL MEDICINE
Payer: COMMERCIAL

## 2025-04-02 PROBLEM — Z77.22 TOBACCO SMOKE EXPOSURE: Status: ACTIVE | Noted: 2025-04-02

## 2025-04-02 LAB
ACINETOBACTER CALCOACETICUS-BAUMANNII BY PCR: NOT DETECTED
ACINETOBACTER CALCOACETICUS-BAUMANNII BY PCR: NOT DETECTED
ANION GAP SERPL CALC-SCNC: 9 MEQ/L (ref 8–16)
BACTERIA BLD AEROBE CULT: NORMAL
BACTERIA BLD AEROBE CULT: NORMAL
BACTERIA SPEC ANAEROBE CULT: ABNORMAL
BACTERIA SPEC BFLD CULT: ABNORMAL
BACTERIA SPEC BFLD CULT: ABNORMAL
BAL CHARACTER: ABNORMAL
BAL COLLECTION SITE: ABNORMAL
BAL COLOR: COLORLESS
BLACTX-M ISLT/SPM QL: NORMAL
BLACTX-M ISLT/SPM QL: NORMAL
BLAIMP ISLT/SPM QL: NORMAL
BLAIMP ISLT/SPM QL: NORMAL
BLAKPC ISLT/SPM QL: NORMAL
BLAKPC ISLT/SPM QL: NORMAL
BLAOXA-48-LIKE ISLT/SPM QL: NORMAL
BLAOXA-48-LIKE ISLT/SPM QL: NORMAL
BLAVIM ISLT/SPM QL: NORMAL
BLAVIM ISLT/SPM QL: NORMAL
BUN SERPL-MCNC: 27 MG/DL (ref 8–23)
C PNEUM DNA LOWER RESP QL NAA+NON-PROBE: NOT DETECTED
C PNEUM DNA LOWER RESP QL NAA+NON-PROBE: NOT DETECTED
CALCIUM SERPL-MCNC: 8.5 MG/DL (ref 8.8–10.2)
CHLORIDE SERPL-SCNC: 100 MEQ/L (ref 98–111)
CO2 SERPL-SCNC: 26 MEQ/L (ref 22–29)
CREAT SERPL-MCNC: 1.2 MG/DL (ref 0.7–1.2)
CYTOLOGY-NON GYN: NORMAL
CYTOLOGY-NON GYN: NORMAL
DEPRECATED RDW RBC AUTO: 49.5 FL (ref 35–45)
ENTEROBACTER CLOACAE COMPLEX BY PCR: NOT DETECTED
ENTEROBACTER CLOACAE COMPLEX BY PCR: NOT DETECTED
ERYTHROCYTE [DISTWIDTH] IN BLOOD BY AUTOMATED COUNT: 15.5 % (ref 11.5–14.5)
ESCHERICHIA COLI BY PCR: NOT DETECTED
ESCHERICHIA COLI BY PCR: NOT DETECTED
FLUAV RNA LOWER RESP QL NAA+NON-PROBE: NOT DETECTED
FLUAV RNA LOWER RESP QL NAA+NON-PROBE: NOT DETECTED
FLUBV RNA LOWER RESP QL NAA+NON-PROBE: NOT DETECTED
FLUBV RNA LOWER RESP QL NAA+NON-PROBE: NOT DETECTED
GAMMA INTERFERON BACKGROUND BLD IA-ACNC: 0.04 IU/ML
GFR SERPL CREATININE-BSD FRML MDRD: 68 ML/MIN/1.73M2
GLUCOSE SERPL-MCNC: 120 MG/DL (ref 74–109)
GRAM STN SPEC: ABNORMAL
H CAPSUL AG UR QL IA: NOT DETECTED
H CAPSUL AG UR-MCNC: NOT DETECTED NG/ML
HADV DNA LOWER RESP QL NAA+NON-PROBE: NOT DETECTED
HADV DNA LOWER RESP QL NAA+NON-PROBE: NOT DETECTED
HAEMOPHILUS INFLUENZAE BY PCR: NOT DETECTED
HAEMOPHILUS INFLUENZAE BY PCR: NOT DETECTED
HCOV RNA LOWER RESP QL NAA+NON-PROBE: NOT DETECTED
HCOV RNA LOWER RESP QL NAA+NON-PROBE: NOT DETECTED
HCT VFR BLD AUTO: 29.3 % (ref 42–52)
HGB BLD-MCNC: 9.5 GM/DL (ref 14–18)
HMPV RNA LOWER RESP QL NAA+NON-PROBE: NOT DETECTED
HMPV RNA LOWER RESP QL NAA+NON-PROBE: NOT DETECTED
HPIV RNA LOWER RESP QL NAA+NON-PROBE: NOT DETECTED
HPIV RNA LOWER RESP QL NAA+NON-PROBE: NOT DETECTED
KLEBSIELLA AEROGENES BY PCR: NOT DETECTED
KLEBSIELLA AEROGENES BY PCR: NOT DETECTED
KLEBSIELLA OXYTOCA BY PCR: NOT DETECTED
KLEBSIELLA OXYTOCA BY PCR: NOT DETECTED
KLEBSIELLA PNEUMONIAE GROUP BY PCR: NOT DETECTED
KLEBSIELLA PNEUMONIAE GROUP BY PCR: NOT DETECTED
L PNEUMO DNA LOWER RESP QL NAA+NON-PROBE: NOT DETECTED
L PNEUMO DNA LOWER RESP QL NAA+NON-PROBE: NOT DETECTED
LYMPHOCYTES NFR BRONCH MANUAL: 2 % (ref 10–15)
M PNEUMO DNA LOWER RESP QL NAA+NON-PROBE: NOT DETECTED
M PNEUMO DNA LOWER RESP QL NAA+NON-PROBE: NOT DETECTED
M TB IFN-G BLD-IMP: NEGATIVE
M TB IFN-G CD4+ BCKGRND COR BLD-ACNC: 0.01 IU/ML
M TB IFN-G CD4+CD8+ BCKGRND COR BLD-ACNC: 0 IU/ML
MACROPHAGE/MONOCYTE BAL: 4 % (ref 86–100)
MCH RBC QN AUTO: 28.1 PG (ref 26–33)
MCHC RBC AUTO-ENTMCNC: 32.4 GM/DL (ref 32.2–35.5)
MCV RBC AUTO: 86.7 FL (ref 80–94)
MITOGEN IGNF BCKGRD COR BLD-ACNC: 1.73 IU/ML
MORAXELLA CATARRHALIS BY PCR: NOT DETECTED
MORAXELLA CATARRHALIS BY PCR: NOT DETECTED
NEUTROPHILS NFR BRONCH MANUAL: 94 % (ref 0–3)
ORGANISM: ABNORMAL
PATHOLOGIST REVIEW: ABNORMAL
PLATELET # BLD AUTO: 607 THOU/MM3 (ref 130–400)
PMV BLD AUTO: 9.1 FL (ref 9.4–12.4)
POTASSIUM SERPL-SCNC: 5.2 MEQ/L (ref 3.5–5.2)
PROTEUS SPECIES BY PCR: NOT DETECTED
PROTEUS SPECIES BY PCR: NOT DETECTED
PSEUDOMONAS AERUGINOSA BY PCR: NOT DETECTED
PSEUDOMONAS AERUGINOSA BY PCR: NOT DETECTED
RBC # BLD AUTO: 3.38 MILL/MM3 (ref 4.7–6.1)
RBC BAL: 56 /CUMM
RESISTANT GENE MECA/C & MREJ BY PCR: NORMAL
RESISTANT GENE MECA/C & MREJ BY PCR: NORMAL
RESISTANT GENE NDM BY PCR: NORMAL
RESISTANT GENE NDM BY PCR: NORMAL
RSV RNA LOWER RESP QL NAA+NON-PROBE: NOT DETECTED
RSV RNA LOWER RESP QL NAA+NON-PROBE: NOT DETECTED
RV+EV RNA LOWER RESP QL NAA+NON-PROBE: NOT DETECTED
RV+EV RNA LOWER RESP QL NAA+NON-PROBE: NOT DETECTED
SERRATIA MARCESCENS BY PCR: NOT DETECTED
SERRATIA MARCESCENS BY PCR: NOT DETECTED
SODIUM SERPL-SCNC: 135 MEQ/L (ref 135–145)
SOURCE: NORMAL
SOURCE: NORMAL
SPECIMEN ACCEPTABILITY: NORMAL
SPECIMEN ACCEPTABILITY: NORMAL
STAPH AUREUS BY PCR: NOT DETECTED
STAPH AUREUS BY PCR: NOT DETECTED
STREP AGALACTIAE BY PCR: NOT DETECTED
STREP AGALACTIAE BY PCR: NOT DETECTED
STREP PNEUMONIAE BY PCR: NOT DETECTED
STREP PNEUMONIAE BY PCR: NOT DETECTED
STREP PYOGENES BY PCR: NOT DETECTED
STREP PYOGENES BY PCR: NOT DETECTED
TOTAL NUCLEATED CELLS BAL: 5033 /CUMM
TOTAL VOLUME RECEIVED BAL: 35 ML
WBC # BLD AUTO: 11.2 THOU/MM3 (ref 4.8–10.8)

## 2025-04-02 PROCEDURE — 87116 MYCOBACTERIA CULTURE: CPT

## 2025-04-02 PROCEDURE — 99233 SBSQ HOSP IP/OBS HIGH 50: CPT | Performed by: INTERNAL MEDICINE

## 2025-04-02 PROCEDURE — 87496 CYTOMEG DNA AMP PROBE: CPT

## 2025-04-02 PROCEDURE — 31624 DX BRONCHOSCOPE/LAVAGE: CPT | Performed by: INTERNAL MEDICINE

## 2025-04-02 PROCEDURE — 3609010800 HC BRONCHOSCOPY ALVEOLAR LAVAGE: Performed by: INTERNAL MEDICINE

## 2025-04-02 PROCEDURE — 2500000003 HC RX 250 WO HCPCS

## 2025-04-02 PROCEDURE — 3609027000 HC BRONCHOSCOPY: Performed by: INTERNAL MEDICINE

## 2025-04-02 PROCEDURE — 87631 RESP VIRUS 3-5 TARGETS: CPT

## 2025-04-02 PROCEDURE — 71045 X-RAY EXAM CHEST 1 VIEW: CPT

## 2025-04-02 PROCEDURE — 0BBJ8ZX EXCISION OF LEFT LOWER LUNG LOBE, VIA NATURAL OR ARTIFICIAL OPENING ENDOSCOPIC, DIAGNOSTIC: ICD-10-PCS | Performed by: INTERNAL MEDICINE

## 2025-04-02 PROCEDURE — 87486 CHLMYD PNEUM DNA AMP PROBE: CPT

## 2025-04-02 PROCEDURE — 99233 SBSQ HOSP IP/OBS HIGH 50: CPT | Performed by: PHYSICIAN ASSISTANT

## 2025-04-02 PROCEDURE — 0B9G8ZZ DRAINAGE OF LEFT UPPER LUNG LOBE, VIA NATURAL OR ARTIFICIAL OPENING ENDOSCOPIC: ICD-10-PCS | Performed by: INTERNAL MEDICINE

## 2025-04-02 PROCEDURE — 2500000003 HC RX 250 WO HCPCS: Performed by: NURSE ANESTHETIST, CERTIFIED REGISTERED

## 2025-04-02 PROCEDURE — 88305 TISSUE EXAM BY PATHOLOGIST: CPT

## 2025-04-02 PROCEDURE — 6370000000 HC RX 637 (ALT 250 FOR IP)

## 2025-04-02 PROCEDURE — 80048 BASIC METABOLIC PNL TOTAL CA: CPT

## 2025-04-02 PROCEDURE — 88112 CYTOPATH CELL ENHANCE TECH: CPT

## 2025-04-02 PROCEDURE — 87541 LEGION PNEUMO DNA AMP PROB: CPT

## 2025-04-02 PROCEDURE — 6370000000 HC RX 637 (ALT 250 FOR IP): Performed by: STUDENT IN AN ORGANIZED HEALTH CARE EDUCATION/TRAINING PROGRAM

## 2025-04-02 PROCEDURE — 36415 COLL VENOUS BLD VENIPUNCTURE: CPT

## 2025-04-02 PROCEDURE — 0B998ZZ DRAINAGE OF LINGULA BRONCHUS, VIA NATURAL OR ARTIFICIAL OPENING ENDOSCOPIC: ICD-10-PCS | Performed by: INTERNAL MEDICINE

## 2025-04-02 PROCEDURE — 89051 BODY FLUID CELL COUNT: CPT

## 2025-04-02 PROCEDURE — 6360000002 HC RX W HCPCS: Performed by: NURSE ANESTHETIST, CERTIFIED REGISTERED

## 2025-04-02 PROCEDURE — 2709999900 HC NON-CHARGEABLE SUPPLY: Performed by: INTERNAL MEDICINE

## 2025-04-02 PROCEDURE — 87798 DETECT AGENT NOS DNA AMP: CPT

## 2025-04-02 PROCEDURE — 88104 CYTOPATH FL NONGYN SMEARS: CPT

## 2025-04-02 PROCEDURE — 0B9J8ZZ DRAINAGE OF LEFT LOWER LUNG LOBE, VIA NATURAL OR ARTIFICIAL OPENING ENDOSCOPIC: ICD-10-PCS | Performed by: INTERNAL MEDICINE

## 2025-04-02 PROCEDURE — 3700000001 HC ADD 15 MINUTES (ANESTHESIA): Performed by: INTERNAL MEDICINE

## 2025-04-02 PROCEDURE — 31623 DX BRONCHOSCOPE/BRUSH: CPT | Performed by: INTERNAL MEDICINE

## 2025-04-02 PROCEDURE — 85027 COMPLETE CBC AUTOMATED: CPT

## 2025-04-02 PROCEDURE — 2580000003 HC RX 258: Performed by: NURSE ANESTHETIST, CERTIFIED REGISTERED

## 2025-04-02 PROCEDURE — 0B9J8ZX DRAINAGE OF LEFT LOWER LUNG LOBE, VIA NATURAL OR ARTIFICIAL OPENING ENDOSCOPIC, DIAGNOSTIC: ICD-10-PCS | Performed by: INTERNAL MEDICINE

## 2025-04-02 PROCEDURE — 87305 ASPERGILLUS AG IA: CPT

## 2025-04-02 PROCEDURE — 87205 SMEAR GRAM STAIN: CPT

## 2025-04-02 PROCEDURE — 6370000000 HC RX 637 (ALT 250 FOR IP): Performed by: PHYSICIAN ASSISTANT

## 2025-04-02 PROCEDURE — 7100000000 HC PACU RECOVERY - FIRST 15 MIN: Performed by: INTERNAL MEDICINE

## 2025-04-02 PROCEDURE — 1200000000 HC SEMI PRIVATE

## 2025-04-02 PROCEDURE — 3609011300 HC BRONCHOSCOPY BRONCHIAL/ENDOBRNCL BX 1+ SITES: Performed by: INTERNAL MEDICINE

## 2025-04-02 PROCEDURE — 6360000002 HC RX W HCPCS: Performed by: INTERNAL MEDICINE

## 2025-04-02 PROCEDURE — 6360000002 HC RX W HCPCS: Performed by: PHYSICIAN ASSISTANT

## 2025-04-02 PROCEDURE — 99232 SBSQ HOSP IP/OBS MODERATE 35: CPT | Performed by: STUDENT IN AN ORGANIZED HEALTH CARE EDUCATION/TRAINING PROGRAM

## 2025-04-02 PROCEDURE — 31628 BRONCHOSCOPY/LUNG BX EACH: CPT | Performed by: INTERNAL MEDICINE

## 2025-04-02 PROCEDURE — 87206 SMEAR FLUORESCENT/ACID STAI: CPT

## 2025-04-02 PROCEDURE — 87581 M.PNEUMON DNA AMP PROBE: CPT

## 2025-04-02 PROCEDURE — 87529 HSV DNA AMP PROBE: CPT

## 2025-04-02 PROCEDURE — 3609011100 HC BRONCHOSCOPY BRUSHINGS: Performed by: INTERNAL MEDICINE

## 2025-04-02 PROCEDURE — 2500000003 HC RX 250 WO HCPCS: Performed by: PHYSICIAN ASSISTANT

## 2025-04-02 PROCEDURE — 87070 CULTURE OTHR SPECIMN AEROBIC: CPT

## 2025-04-02 PROCEDURE — 3700000000 HC ANESTHESIA ATTENDED CARE: Performed by: INTERNAL MEDICINE

## 2025-04-02 PROCEDURE — 87102 FUNGUS ISOLATION CULTURE: CPT

## 2025-04-02 PROCEDURE — 7100000001 HC PACU RECOVERY - ADDTL 15 MIN: Performed by: INTERNAL MEDICINE

## 2025-04-02 RX ORDER — ROCURONIUM BROMIDE 10 MG/ML
INJECTION, SOLUTION INTRAVENOUS
Status: DISCONTINUED | OUTPATIENT
Start: 2025-04-02 | End: 2025-04-02 | Stop reason: SDUPTHER

## 2025-04-02 RX ORDER — SODIUM CHLORIDE 9 MG/ML
INJECTION, SOLUTION INTRAVENOUS
Status: DISCONTINUED | OUTPATIENT
Start: 2025-04-02 | End: 2025-04-02 | Stop reason: SDUPTHER

## 2025-04-02 RX ORDER — MIDAZOLAM HYDROCHLORIDE 1 MG/ML
INJECTION, SOLUTION INTRAMUSCULAR; INTRAVENOUS
Status: DISCONTINUED | OUTPATIENT
Start: 2025-04-02 | End: 2025-04-02 | Stop reason: SDUPTHER

## 2025-04-02 RX ORDER — DEXAMETHASONE SODIUM PHOSPHATE 4 MG/ML
INJECTION, SOLUTION INTRA-ARTICULAR; INTRALESIONAL; INTRAMUSCULAR; INTRAVENOUS; SOFT TISSUE
Status: DISCONTINUED | OUTPATIENT
Start: 2025-04-02 | End: 2025-04-02 | Stop reason: SDUPTHER

## 2025-04-02 RX ORDER — LIDOCAINE HYDROCHLORIDE 20 MG/ML
INJECTION, SOLUTION EPIDURAL; INFILTRATION; INTRACAUDAL; PERINEURAL
Status: DISCONTINUED | OUTPATIENT
Start: 2025-04-02 | End: 2025-04-02 | Stop reason: SDUPTHER

## 2025-04-02 RX ORDER — PHENYLEPHRINE HCL IN 0.9% NACL 1 MG/10 ML
SYRINGE (ML) INTRAVENOUS
Status: DISCONTINUED | OUTPATIENT
Start: 2025-04-02 | End: 2025-04-02 | Stop reason: SDUPTHER

## 2025-04-02 RX ORDER — PROPOFOL 10 MG/ML
INJECTION, EMULSION INTRAVENOUS
Status: DISCONTINUED | OUTPATIENT
Start: 2025-04-02 | End: 2025-04-02 | Stop reason: SDUPTHER

## 2025-04-02 RX ORDER — ONDANSETRON 2 MG/ML
INJECTION INTRAMUSCULAR; INTRAVENOUS
Status: DISCONTINUED | OUTPATIENT
Start: 2025-04-02 | End: 2025-04-02 | Stop reason: SDUPTHER

## 2025-04-02 RX ADMIN — ONDANSETRON 4 MG: 2 INJECTION, SOLUTION INTRAMUSCULAR; INTRAVENOUS at 07:43

## 2025-04-02 RX ADMIN — Medication 200 MCG: at 07:35

## 2025-04-02 RX ADMIN — KETOROLAC TROMETHAMINE 30 MG: 30 INJECTION, SOLUTION INTRAMUSCULAR at 11:23

## 2025-04-02 RX ADMIN — ROCURONIUM BROMIDE 50 MG: 10 INJECTION INTRAVENOUS at 07:25

## 2025-04-02 RX ADMIN — OXYCODONE 5 MG: 5 TABLET ORAL at 14:06

## 2025-04-02 RX ADMIN — TRAZODONE HYDROCHLORIDE 50 MG: 50 TABLET ORAL at 01:25

## 2025-04-02 RX ADMIN — Medication 200 MCG: at 07:32

## 2025-04-02 RX ADMIN — OXYCODONE 5 MG: 5 TABLET ORAL at 05:38

## 2025-04-02 RX ADMIN — OXYCODONE 5 MG: 5 TABLET ORAL at 19:47

## 2025-04-02 RX ADMIN — OXYCODONE 5 MG: 5 TABLET ORAL at 09:12

## 2025-04-02 RX ADMIN — SODIUM CHLORIDE, PRESERVATIVE FREE 10 ML: 5 INJECTION INTRAVENOUS at 19:47

## 2025-04-02 RX ADMIN — ASPIRIN 81 MG: 81 TABLET, CHEWABLE ORAL at 09:11

## 2025-04-02 RX ADMIN — DEXAMETHASONE SODIUM PHOSPHATE 10 MG: 4 INJECTION, SOLUTION INTRAMUSCULAR; INTRAVENOUS at 07:43

## 2025-04-02 RX ADMIN — PROPOFOL 120 MG: 10 INJECTION, EMULSION INTRAVENOUS at 07:25

## 2025-04-02 RX ADMIN — METRONIDAZOLE 500 MG: 500 TABLET ORAL at 09:11

## 2025-04-02 RX ADMIN — SODIUM CHLORIDE: 9 INJECTION, SOLUTION INTRAVENOUS at 07:18

## 2025-04-02 RX ADMIN — METRONIDAZOLE 500 MG: 500 TABLET ORAL at 19:47

## 2025-04-02 RX ADMIN — SUGAMMADEX 200 MG: 100 INJECTION, SOLUTION INTRAVENOUS at 07:58

## 2025-04-02 RX ADMIN — KETOROLAC TROMETHAMINE 30 MG: 30 INJECTION, SOLUTION INTRAMUSCULAR at 17:39

## 2025-04-02 RX ADMIN — MIDAZOLAM 2 MG: 1 INJECTION INTRAMUSCULAR; INTRAVENOUS at 07:20

## 2025-04-02 RX ADMIN — LINEZOLID 600 MG: 600 TABLET, FILM COATED ORAL at 19:47

## 2025-04-02 RX ADMIN — DOCUSATE SODIUM 100 MG: 100 CAPSULE, LIQUID FILLED ORAL at 19:47

## 2025-04-02 RX ADMIN — OXYCODONE 5 MG: 5 TABLET ORAL at 01:25

## 2025-04-02 RX ADMIN — ATORVASTATIN CALCIUM 80 MG: 80 TABLET, FILM COATED ORAL at 19:47

## 2025-04-02 RX ADMIN — LINEZOLID 600 MG: 600 TABLET, FILM COATED ORAL at 09:11

## 2025-04-02 RX ADMIN — WATER 2000 MG: 1 INJECTION INTRAMUSCULAR; INTRAVENOUS; SUBCUTANEOUS at 11:23

## 2025-04-02 RX ADMIN — LIDOCAINE HYDROCHLORIDE 40 MG: 20 INJECTION, SOLUTION EPIDURAL; INFILTRATION; INTRACAUDAL; PERINEURAL at 07:25

## 2025-04-02 RX ADMIN — ACETAMINOPHEN 1000 MG: 500 TABLET ORAL at 17:39

## 2025-04-02 ASSESSMENT — PAIN SCALES - GENERAL
PAINLEVEL_OUTOF10: 8
PAINLEVEL_OUTOF10: 7
PAINLEVEL_OUTOF10: 3
PAINLEVEL_OUTOF10: 1
PAINLEVEL_OUTOF10: 0
PAINLEVEL_OUTOF10: 3
PAINLEVEL_OUTOF10: 2
PAINLEVEL_OUTOF10: 3
PAINLEVEL_OUTOF10: 8
PAINLEVEL_OUTOF10: 3

## 2025-04-02 ASSESSMENT — PAIN - FUNCTIONAL ASSESSMENT: PAIN_FUNCTIONAL_ASSESSMENT: NONE - DENIES PAIN

## 2025-04-02 ASSESSMENT — PAIN DESCRIPTION - ORIENTATION
ORIENTATION: LEFT
ORIENTATION: LEFT

## 2025-04-02 ASSESSMENT — PAIN DESCRIPTION - DESCRIPTORS: DESCRIPTORS: ACHING;DISCOMFORT

## 2025-04-02 ASSESSMENT — PAIN DESCRIPTION - LOCATION
LOCATION: RIB CAGE
LOCATION: CHEST
LOCATION: CHEST
LOCATION: RIB CAGE

## 2025-04-02 ASSESSMENT — ENCOUNTER SYMPTOMS: SHORTNESS OF BREATH: 1

## 2025-04-02 NOTE — CARE COORDINATION
4/2/25, 3:41 PM EDT    DISCHARGE ON GOING EVALUATION    Dona Kaur       [facility] day: 7  Location: -32/032-A Reason for admit: Elevated troponin [R79.89]  Pleural effusion [J90]  NSTEMI (non-ST elevated myocardial infarction) (HCC) [I21.4]     Procedures:   3/26 Ultrasound guided thoracentesis @ [facility]   3/27 ECHO EF 55-60%  3/27 CT guided chest tube placement per IR  4/2 Flexible diagnostic fiberoptic bronchoscopy with fluoroscopy.     Imaging since last note:   3/29 CXR: No changes   3/30 CXR: Small loculated left pleural effusion, decreased.   3/30 KUB: Negative   3/31 CXR: The small loculated left  pleural effusion is decreased possibly resolved.  4/1 CXR: Similar left lower lung consolidation.  Similar left pleural effusion. Left chest tube remains in place.  4/1 CT Chest: Left-sided chest tube in place.  2. Loculated fluid collections in the left hemithorax as described above. 3. Abnormal density in the left lower lobe consistent with atelectasis or infiltrate. Small abnormal density in the right upper lobe posteriorly. 4. Small mediastinal lymph nodes.  4/2 CXR: No changes     Barriers to Discharge: Hospitalist, CTS, ID and Pulmonology following. Bronchoscopy this AM.  Plan for left thoracotomy w/ decortication Friday. Asa. IV rocephin q24h. DM management. IV toradol prn. PO zyvox. PO flagyl. Roxicodone prn. 93% on 2L NC. Chest tube to continuous suction.     PCP: Larisa Abbasi, APRN - CNP  Readmission Risk Score: 14.4    Patient Goals/Plan/Treatment Preferences: From home alone. Continue to follow for needs; OR Friday.

## 2025-04-02 NOTE — ANESTHESIA PRE PROCEDURE
04/02/2025 05:45 AM    BILITOT 0.3 03/26/2025 08:45 AM    ALKPHOS 110 03/26/2025 08:45 AM    AST 11 03/26/2025 08:45 AM    ALT 9 03/26/2025 08:45 AM       POC Tests:   Recent Labs     03/31/25  0812   POCGLU 138*       Coags:   Lab Results   Component Value Date/Time    PROTIME 13.2 03/26/2025 08:45 AM    INR 1.16 04/01/2025 08:45 PM    APTT 31.2 04/01/2025 08:45 PM       HCG (If Applicable): No results found for: \"PREGTESTUR\", \"PREGSERUM\", \"HCG\", \"HCGQUANT\"     ABGs:   Lab Results   Component Value Date/Time    PHART 7.330 02/18/2020 12:30 PM    PO2ART 89.0 02/18/2020 12:30 PM    VHI6DVO 26.3 02/18/2020 12:30 PM    PHT9HWR 13.6 02/18/2020 12:30 PM    O2RBAVMK 96.4 02/18/2020 12:30 PM        Type & Screen (If Applicable):  No results found for: \"ABORH\", \"LABANTI\"    Drug/Infectious Status (If Applicable):  No results found for: \"HIV\", \"HEPCAB\"    COVID-19 Screening (If Applicable):   Lab Results   Component Value Date/Time    COVID19 NOT DETECTED 03/26/2025 09:39 PM           Anesthesia Evaluation  Patient summary reviewed   no history of anesthetic complications:   Airway: Mallampati: II  TM distance: >3 FB   Neck ROM: full  Mouth opening: > = 3 FB   Dental: normal exam         Pulmonary: breath sounds clear to auscultation  (+) pneumonia: unresolved,   shortness of breath:             (-) asthma and sleep apnea                           Cardiovascular:    (+) hypertension:, past MI:                  Neuro/Psych:   Negative Neuro/Psych ROS     (-) seizures, TIA and CVA            ROS comment: Psoriasis  GI/Hepatic/Renal:        (-) GERD and liver disease       Endo/Other:    (+) DiabetesType I DM, well controlled, using insulin.    (-) hypothyroidism               Abdominal:             Vascular: negative vascular ROS.    - PVD, DVT and PE.      Other Findings:             Anesthesia Plan      general     ASA 3       Induction: intravenous.    MIPS: Prophylactic antiemetics administered.  Anesthetic plan and risks

## 2025-04-02 NOTE — PRE SEDATION
A, APRN - CNP        insulin lispro (HUMALOG,ADMELOG) injection vial 4 Units  4 Units SubCUTAneous TID AC LongRonald mallorylie A, APRN - CNP        traZODone (DESYREL) tablet 50 mg  50 mg Oral Nightly PRN Longmeier, Linnette A, APRN - CNP   50 mg at 04/02/25 0125    glucose chewable tablet 16 g  4 tablet Oral PRN Longmeier, Linnette A, APRN - CNP        dextrose bolus 10% 125 mL  125 mL IntraVENous PRN Longmeier, Linnette A, APRN - CNP        Or    dextrose bolus 10% 250 mL  250 mL IntraVENous PRN Longmeier, Linnette A, APRN - CNP        glucagon injection 1 mg  1 mg SubCUTAneous PRN Longmeier, Linnette A, APRN - CNP        dextrose 10 % infusion   IntraVENous Continuous PRN Longmeier, Linnette A, APRN - CNP             Coumadin Use Last 7 Days:  no  Antiplatelet drug therapy use last 7 days: Aspirin 81 mg p.o. daily  Other anticoagulant use last 7 days: no       Pre-Sedation Documentation and Exam:   I have personally completed a history, physical exam & review of systems for this patient (see notes).    Mallampati Airway Assessment:  Please see CRNA note for details.    Prior History of Anesthesia Complications:   Please see CRNA note for details.    ASA Classification:  Please see CRNA note for details.    Sedation/ Anesthesia Plan:   Patient was given anesthesia by CRNA Ms. Haley from anesthesiology dept. Please see detailed note by CRNA for details.      Electronically signed by Susana Munoz MD on 4/2/2025 at 8:21 AM

## 2025-04-03 ENCOUNTER — APPOINTMENT (OUTPATIENT)
Dept: GENERAL RADIOLOGY | Age: 65
DRG: 121 | End: 2025-04-03
Attending: INTERNAL MEDICINE
Payer: COMMERCIAL

## 2025-04-03 PROBLEM — E44.0 MODERATE MALNUTRITION: Status: ACTIVE | Noted: 2025-04-03

## 2025-04-03 LAB
ANION GAP SERPL CALC-SCNC: 10 MEQ/L (ref 8–16)
BACTERIA: ABNORMAL
BILIRUB UR QL STRIP: NEGATIVE
BUN SERPL-MCNC: 42 MG/DL (ref 8–23)
CALCIUM SERPL-MCNC: 8 MG/DL (ref 8.8–10.2)
CASTS #/AREA URNS LPF: ABNORMAL /LPF
CASTS #/AREA URNS LPF: ABNORMAL /LPF
CHARACTER UR: CLEAR
CHARCOAL URNS QL MICRO: ABNORMAL
CHLORIDE SERPL-SCNC: 99 MEQ/L (ref 98–111)
CO2 SERPL-SCNC: 22 MEQ/L (ref 22–29)
COLOR UR: YELLOW
CREAT SERPL-MCNC: 1.5 MG/DL (ref 0.7–1.2)
CRYSTALS URNS QL MICRO: ABNORMAL
DEPRECATED RDW RBC AUTO: 49.1 FL (ref 35–45)
EPITHELIAL CELLS, UA: ABNORMAL /HPF
ERYTHROCYTE [DISTWIDTH] IN BLOOD BY AUTOMATED COUNT: 15.3 % (ref 11.5–14.5)
GFR SERPL CREATININE-BSD FRML MDRD: 52 ML/MIN/1.73M2
GLUCOSE BLD STRIP.AUTO-MCNC: 251 MG/DL (ref 70–108)
GLUCOSE BLD STRIP.AUTO-MCNC: 273 MG/DL (ref 70–108)
GLUCOSE BLD STRIP.AUTO-MCNC: 283 MG/DL (ref 70–108)
GLUCOSE SERPL-MCNC: 348 MG/DL (ref 74–109)
GLUCOSE UR QL STRIP.AUTO: 500 MG/DL
HCT VFR BLD AUTO: 27.8 % (ref 42–52)
HGB BLD-MCNC: 9.1 GM/DL (ref 14–18)
HGB UR QL STRIP.AUTO: NEGATIVE
KETONES UR QL STRIP.AUTO: ABNORMAL
LEUKOCYTE ESTERASE UR QL STRIP.AUTO: ABNORMAL
MCH RBC QN AUTO: 28.4 PG (ref 26–33)
MCHC RBC AUTO-ENTMCNC: 32.7 GM/DL (ref 32.2–35.5)
MCV RBC AUTO: 86.9 FL (ref 80–94)
NITRITE UR QL STRIP.AUTO: NEGATIVE
PH UR STRIP.AUTO: 5 [PH] (ref 5–9)
PLATELET # BLD AUTO: 564 THOU/MM3 (ref 130–400)
PMV BLD AUTO: 9.4 FL (ref 9.4–12.4)
POTASSIUM SERPL-SCNC: 5 MEQ/L (ref 3.5–5.2)
PROT UR STRIP.AUTO-MCNC: 30 MG/DL
RBC # BLD AUTO: 3.2 MILL/MM3 (ref 4.7–6.1)
RBC #/AREA URNS HPF: ABNORMAL /HPF
RENAL EPI CELLS #/AREA URNS HPF: ABNORMAL /[HPF]
SODIUM SERPL-SCNC: 131 MEQ/L (ref 135–145)
SPECIFIC GRAVITY UA: > 1.03 (ref 1–1.03)
UROBILINOGEN, URINE: 0.2 EU/DL (ref 0–1)
WBC # BLD AUTO: 12.4 THOU/MM3 (ref 4.8–10.8)
WBC #/AREA URNS HPF: ABNORMAL /HPF
YEAST LIKE FUNGI URNS QL MICRO: ABNORMAL

## 2025-04-03 PROCEDURE — 99233 SBSQ HOSP IP/OBS HIGH 50: CPT | Performed by: INTERNAL MEDICINE

## 2025-04-03 PROCEDURE — 2580000003 HC RX 258

## 2025-04-03 PROCEDURE — 97535 SELF CARE MNGMENT TRAINING: CPT

## 2025-04-03 PROCEDURE — 6370000000 HC RX 637 (ALT 250 FOR IP): Performed by: PHYSICIAN ASSISTANT

## 2025-04-03 PROCEDURE — 6370000000 HC RX 637 (ALT 250 FOR IP)

## 2025-04-03 PROCEDURE — APPSS30 APP SPLIT SHARED TIME 16-30 MINUTES: Performed by: PHYSICIAN ASSISTANT

## 2025-04-03 PROCEDURE — 85027 COMPLETE CBC AUTOMATED: CPT

## 2025-04-03 PROCEDURE — 97116 GAIT TRAINING THERAPY: CPT

## 2025-04-03 PROCEDURE — 81001 URINALYSIS AUTO W/SCOPE: CPT

## 2025-04-03 PROCEDURE — 99232 SBSQ HOSP IP/OBS MODERATE 35: CPT

## 2025-04-03 PROCEDURE — 2500000003 HC RX 250 WO HCPCS: Performed by: PHYSICIAN ASSISTANT

## 2025-04-03 PROCEDURE — 97166 OT EVAL MOD COMPLEX 45 MIN: CPT

## 2025-04-03 PROCEDURE — 99232 SBSQ HOSP IP/OBS MODERATE 35: CPT | Performed by: STUDENT IN AN ORGANIZED HEALTH CARE EDUCATION/TRAINING PROGRAM

## 2025-04-03 PROCEDURE — 6370000000 HC RX 637 (ALT 250 FOR IP): Performed by: STUDENT IN AN ORGANIZED HEALTH CARE EDUCATION/TRAINING PROGRAM

## 2025-04-03 PROCEDURE — 1200000000 HC SEMI PRIVATE

## 2025-04-03 PROCEDURE — 6360000002 HC RX W HCPCS: Performed by: PHYSICIAN ASSISTANT

## 2025-04-03 PROCEDURE — 71045 X-RAY EXAM CHEST 1 VIEW: CPT

## 2025-04-03 PROCEDURE — 97161 PT EVAL LOW COMPLEX 20 MIN: CPT

## 2025-04-03 PROCEDURE — 80048 BASIC METABOLIC PNL TOTAL CA: CPT

## 2025-04-03 PROCEDURE — 2500000003 HC RX 250 WO HCPCS

## 2025-04-03 PROCEDURE — 36415 COLL VENOUS BLD VENIPUNCTURE: CPT

## 2025-04-03 PROCEDURE — 97530 THERAPEUTIC ACTIVITIES: CPT

## 2025-04-03 PROCEDURE — 82948 REAGENT STRIP/BLOOD GLUCOSE: CPT

## 2025-04-03 RX ORDER — SODIUM CHLORIDE 9 MG/ML
INJECTION, SOLUTION INTRAVENOUS PRN
Status: DISCONTINUED | OUTPATIENT
Start: 2025-04-03 | End: 2025-04-04

## 2025-04-03 RX ORDER — SODIUM CHLORIDE 9 MG/ML
INJECTION, SOLUTION INTRAVENOUS CONTINUOUS
Status: ACTIVE | OUTPATIENT
Start: 2025-04-03 | End: 2025-04-04

## 2025-04-03 RX ORDER — KETOROLAC TROMETHAMINE 30 MG/ML
15 INJECTION, SOLUTION INTRAMUSCULAR; INTRAVENOUS EVERY 6 HOURS PRN
Status: DISCONTINUED | OUTPATIENT
Start: 2025-04-03 | End: 2025-04-04

## 2025-04-03 RX ORDER — SODIUM CHLORIDE 9 MG/ML
INJECTION, SOLUTION INTRAVENOUS CONTINUOUS
Status: DISCONTINUED | OUTPATIENT
Start: 2025-04-03 | End: 2025-04-03

## 2025-04-03 RX ORDER — SODIUM CHLORIDE 0.9 % (FLUSH) 0.9 %
5-40 SYRINGE (ML) INJECTION EVERY 12 HOURS SCHEDULED
Status: DISCONTINUED | OUTPATIENT
Start: 2025-04-03 | End: 2025-04-04

## 2025-04-03 RX ORDER — SODIUM CHLORIDE 0.9 % (FLUSH) 0.9 %
5-40 SYRINGE (ML) INJECTION PRN
Status: DISCONTINUED | OUTPATIENT
Start: 2025-04-03 | End: 2025-04-04

## 2025-04-03 RX ADMIN — OXYCODONE 5 MG: 5 TABLET ORAL at 10:01

## 2025-04-03 RX ADMIN — ACETAMINOPHEN 1000 MG: 500 TABLET ORAL at 20:57

## 2025-04-03 RX ADMIN — OXYCODONE 5 MG: 5 TABLET ORAL at 18:20

## 2025-04-03 RX ADMIN — SODIUM CHLORIDE, PRESERVATIVE FREE 10 ML: 5 INJECTION INTRAVENOUS at 20:58

## 2025-04-03 RX ADMIN — METRONIDAZOLE 500 MG: 500 TABLET ORAL at 10:01

## 2025-04-03 RX ADMIN — SODIUM CHLORIDE: 0.9 INJECTION, SOLUTION INTRAVENOUS at 15:47

## 2025-04-03 RX ADMIN — LINEZOLID 600 MG: 600 TABLET, FILM COATED ORAL at 10:01

## 2025-04-03 RX ADMIN — OXYCODONE 5 MG: 5 TABLET ORAL at 14:19

## 2025-04-03 RX ADMIN — OXYCODONE 5 MG: 5 TABLET ORAL at 22:57

## 2025-04-03 RX ADMIN — ATORVASTATIN CALCIUM 80 MG: 80 TABLET, FILM COATED ORAL at 20:57

## 2025-04-03 RX ADMIN — ASPIRIN 81 MG: 81 TABLET, CHEWABLE ORAL at 09:58

## 2025-04-03 RX ADMIN — WATER 2000 MG: 1 INJECTION INTRAMUSCULAR; INTRAVENOUS; SUBCUTANEOUS at 12:41

## 2025-04-03 RX ADMIN — ACETAMINOPHEN 1000 MG: 500 TABLET ORAL at 02:55

## 2025-04-03 RX ADMIN — TRAZODONE HYDROCHLORIDE 50 MG: 50 TABLET ORAL at 22:57

## 2025-04-03 RX ADMIN — LINEZOLID 600 MG: 600 TABLET, FILM COATED ORAL at 20:57

## 2025-04-03 RX ADMIN — METRONIDAZOLE 500 MG: 500 TABLET ORAL at 20:57

## 2025-04-03 RX ADMIN — DOCUSATE SODIUM 100 MG: 100 CAPSULE, LIQUID FILLED ORAL at 20:57

## 2025-04-03 ASSESSMENT — PAIN SCALES - GENERAL
PAINLEVEL_OUTOF10: 3
PAINLEVEL_OUTOF10: 2
PAINLEVEL_OUTOF10: 7
PAINLEVEL_OUTOF10: 3
PAINLEVEL_OUTOF10: 6
PAINLEVEL_OUTOF10: 6
PAINLEVEL_OUTOF10: 3

## 2025-04-03 ASSESSMENT — PAIN DESCRIPTION - LOCATION
LOCATION: CHEST
LOCATION: BACK

## 2025-04-03 NOTE — ANESTHESIA POSTPROCEDURE EVALUATION
Department of Anesthesiology  Postprocedure Note    Patient: Dona Kaur  MRN: [id number]  YOB: 1960  Date of evaluation: 4/3/2025    Procedure Summary       Date: 04/02/25 Room / Location: [place] [address] / [facility]    Anesthesia Start: 0718 Anesthesia Stop: 0815    Procedures:       BRONCHOSCOPY BIOPSY BRONCHUS      BRONCHOSCOPY BRUSHINGS      BRONCHOSCOPY ALVEOLAR LAVAGE AND WASHINGS      BRONCHOSCOPY FLUOROSCOPY Diagnosis:       Elevated troponin      (Elevated troponin [R79.89])    Surgeons: Susana Munoz MD Responsible Provider: Zachariah Odell DO    Anesthesia Type: general ASA Status: 3            Anesthesia Type: No value filed.    Jocelyne Phase I: Jocelyne Score: 9    Jocelyne Phase II:      Anesthesia Post Evaluation    Patient location during evaluation: PACU  Patient participation: complete - patient participated  Level of consciousness: awake  Airway patency: patent  Nausea & Vomiting: no nausea  Cardiovascular status: hemodynamically stable  Respiratory status: acceptable  Hydration status: stable  Pain management: adequate    No notable events documented.

## 2025-04-03 NOTE — CARE COORDINATION
4/3/25, 3:23 PM EDT    DISCHARGE ON GOING EVALUATION    Dona Kaur       [facility] day: 8  Location: -32/032-A Reason for admit: Elevated troponin [R79.89]  Pleural effusion [J90]  NSTEMI (non-ST elevated myocardial infarction) (HCC) [I21.4]     Procedures:   3/26 Ultrasound guided thoracentesis @ [facility]   3/27 ECHO EF 55-60%  3/27 CT guided chest tube placement per IR  4/2 Flexible diagnostic fiberoptic bronchoscopy with fluoroscopy.     Imaging since last note:   4/3 CXR: Small loculated pleural effusion, decreased     Barriers to Discharge: Hospitalist, CTS, ID and Pulmonology following. NPO at midnight. Plan for left thoracotomy w/ decortication tomorrow. IVF. Asa. IV rocephin q24h. IV toradol prn. PO zyvox. PO flagyl. Roxicodone prn. 95% on room air. Chest tube to continuous suction.     PCP: Larisa Abbasi, APRN - CNP  Readmission Risk Score: 16.1    Patient Goals/Plan/Treatment Preferences: From home alone. Continue to follow for needs; OR Friday.

## 2025-04-04 ENCOUNTER — ANESTHESIA (OUTPATIENT)
Dept: OPERATING ROOM | Age: 65
End: 2025-04-04
Payer: COMMERCIAL

## 2025-04-04 ENCOUNTER — APPOINTMENT (OUTPATIENT)
Dept: GENERAL RADIOLOGY | Age: 65
DRG: 121 | End: 2025-04-04
Attending: INTERNAL MEDICINE
Payer: COMMERCIAL

## 2025-04-04 PROBLEM — Z98.890 S/P THORACOTOMY: Status: ACTIVE | Noted: 2025-04-04

## 2025-04-04 LAB
ABO GROUP BLD: NORMAL
ACID FAST MOD KINY STN SPEC: NORMAL
ACID FAST MOD KINY STN SPEC: NORMAL
ANION GAP SERPL CALC-SCNC: 8 MEQ/L (ref 8–16)
BACTERIA SPEC RESP CULT: NORMAL
BASE EXCESS BLDA CALC-SCNC: -1.9 MMOL/L (ref -2.5–2.5)
BDY SITE: ABNORMAL
BUN SERPL-MCNC: 29 MG/DL (ref 8–23)
CA-I BLD ISE-SCNC: 1.14 MMOL/L (ref 1.12–1.32)
CALCIUM SERPL-MCNC: 8.4 MG/DL (ref 8.8–10.2)
CHLORIDE SERPL-SCNC: 104 MEQ/L (ref 98–111)
CO2 SERPL-SCNC: 25 MEQ/L (ref 22–29)
COLLECTED BY:: ABNORMAL
CREAT SERPL-MCNC: 1.1 MG/DL (ref 0.7–1.2)
DEPRECATED RDW RBC AUTO: 48.6 FL (ref 35–45)
DEPRECATED RDW RBC AUTO: 49.5 FL (ref 35–45)
DEVICE: ABNORMAL
ERYTHROCYTE [DISTWIDTH] IN BLOOD BY AUTOMATED COUNT: 15.5 % (ref 11.5–14.5)
ERYTHROCYTE [DISTWIDTH] IN BLOOD BY AUTOMATED COUNT: 15.6 % (ref 11.5–14.5)
GALACTOMANNAN AG BAL QL: NEGATIVE
GALACTOMANNAN AG SPEC IA-ACNC: 0.04
GFR SERPL CREATININE-BSD FRML MDRD: 76 ML/MIN/1.73M2
GLUCOSE BLD STRIP.AUTO-MCNC: 250 MG/DL (ref 70–108)
GLUCOSE BLD STRIP.AUTO-MCNC: 259 MG/DL (ref 70–108)
GLUCOSE BLD STRIP.AUTO-MCNC: 270 MG/DL (ref 70–108)
GLUCOSE BLD-MCNC: 146 MG/DL (ref 70–108)
GLUCOSE SERPL-MCNC: 137 MG/DL (ref 74–109)
GRAM STN SPEC: NORMAL
HCO3 BLDA-SCNC: 23 MMOL/L (ref 23–28)
HCT VFR BLD AUTO: 27.1 % (ref 42–52)
HCT VFR BLD AUTO: 29.3 % (ref 42–52)
HEMOGLOBIN FINGERSTICK, POC: 8.4 G/DL (ref 14–18)
HGB BLD-MCNC: 8.6 GM/DL (ref 14–18)
HGB BLD-MCNC: 9.6 GM/DL (ref 14–18)
IAT IGG-SP REAG SERPL QL: NORMAL
MCH RBC QN AUTO: 27.7 PG (ref 26–33)
MCH RBC QN AUTO: 28.1 PG (ref 26–33)
MCHC RBC AUTO-ENTMCNC: 31.7 GM/DL (ref 32.2–35.5)
MCHC RBC AUTO-ENTMCNC: 32.8 GM/DL (ref 32.2–35.5)
MCV RBC AUTO: 85.7 FL (ref 80–94)
MCV RBC AUTO: 87.4 FL (ref 80–94)
PCO2 BLDA: 37 MMHG (ref 35–45)
PH BLDA: 7.4 [PH] (ref 7.35–7.45)
PLATELET # BLD AUTO: 560 THOU/MM3 (ref 130–400)
PLATELET # BLD AUTO: 630 THOU/MM3 (ref 130–400)
PMV BLD AUTO: 9 FL (ref 9.4–12.4)
PMV BLD AUTO: 9 FL (ref 9.4–12.4)
PO2 BLDA: 295 MMHG (ref 71–104)
POTASSIUM BLD-SCNC: 4 MEQ/L (ref 3.5–4.9)
POTASSIUM SERPL-SCNC: 5.2 MEQ/L (ref 3.5–5.2)
RBC # BLD AUTO: 3.1 MILL/MM3 (ref 4.7–6.1)
RBC # BLD AUTO: 3.42 MILL/MM3 (ref 4.7–6.1)
RH BLD: NORMAL
SAO2 % BLDA: 100 %
SODIUM BLD-SCNC: 138 MEQ/L (ref 138–146)
SODIUM SERPL-SCNC: 137 MEQ/L (ref 135–145)
VENTILATION MODE VENT: ABNORMAL
WBC # BLD AUTO: 12.1 THOU/MM3 (ref 4.8–10.8)
WBC # BLD AUTO: 17.7 THOU/MM3 (ref 4.8–10.8)

## 2025-04-04 PROCEDURE — 6360000002 HC RX W HCPCS: Performed by: THORACIC SURGERY (CARDIOTHORACIC VASCULAR SURGERY)

## 2025-04-04 PROCEDURE — 2580000003 HC RX 258: Performed by: PHYSICIAN ASSISTANT

## 2025-04-04 PROCEDURE — 6360000002 HC RX W HCPCS: Performed by: STUDENT IN AN ORGANIZED HEALTH CARE EDUCATION/TRAINING PROGRAM

## 2025-04-04 PROCEDURE — 94669 MECHANICAL CHEST WALL OSCILL: CPT

## 2025-04-04 PROCEDURE — 71045 X-RAY EXAM CHEST 1 VIEW: CPT

## 2025-04-04 PROCEDURE — 85027 COMPLETE CBC AUTOMATED: CPT

## 2025-04-04 PROCEDURE — 82947 ASSAY GLUCOSE BLOOD QUANT: CPT

## 2025-04-04 PROCEDURE — 80048 BASIC METABOLIC PNL TOTAL CA: CPT

## 2025-04-04 PROCEDURE — 86885 COOMBS TEST INDIRECT QUAL: CPT

## 2025-04-04 PROCEDURE — 3600000003 HC SURGERY LEVEL 3 BASE: Performed by: THORACIC SURGERY (CARDIOTHORACIC VASCULAR SURGERY)

## 2025-04-04 PROCEDURE — 36415 COLL VENOUS BLD VENIPUNCTURE: CPT

## 2025-04-04 PROCEDURE — 82330 ASSAY OF CALCIUM: CPT

## 2025-04-04 PROCEDURE — 2580000003 HC RX 258

## 2025-04-04 PROCEDURE — 2500000003 HC RX 250 WO HCPCS

## 2025-04-04 PROCEDURE — 32220 RELEASE OF LUNG: CPT | Performed by: PHYSICIAN ASSISTANT

## 2025-04-04 PROCEDURE — 86901 BLOOD TYPING SEROLOGIC RH(D): CPT

## 2025-04-04 PROCEDURE — 32220 RELEASE OF LUNG: CPT | Performed by: THORACIC SURGERY (CARDIOTHORACIC VASCULAR SURGERY)

## 2025-04-04 PROCEDURE — 2060000000 HC ICU INTERMEDIATE R&B

## 2025-04-04 PROCEDURE — 3700000000 HC ANESTHESIA ATTENDED CARE: Performed by: THORACIC SURGERY (CARDIOTHORACIC VASCULAR SURGERY)

## 2025-04-04 PROCEDURE — 6360000002 HC RX W HCPCS

## 2025-04-04 PROCEDURE — 0BNL0ZZ RELEASE LEFT LUNG, OPEN APPROACH: ICD-10-PCS | Performed by: THORACIC SURGERY (CARDIOTHORACIC VASCULAR SURGERY)

## 2025-04-04 PROCEDURE — 2709999900 HC NON-CHARGEABLE SUPPLY: Performed by: THORACIC SURGERY (CARDIOTHORACIC VASCULAR SURGERY)

## 2025-04-04 PROCEDURE — 99232 SBSQ HOSP IP/OBS MODERATE 35: CPT | Performed by: INTERNAL MEDICINE

## 2025-04-04 PROCEDURE — P9045 ALBUMIN (HUMAN), 5%, 250 ML: HCPCS

## 2025-04-04 PROCEDURE — 86900 BLOOD TYPING SEROLOGIC ABO: CPT

## 2025-04-04 PROCEDURE — 6370000000 HC RX 637 (ALT 250 FOR IP): Performed by: THORACIC SURGERY (CARDIOTHORACIC VASCULAR SURGERY)

## 2025-04-04 PROCEDURE — 7100000001 HC PACU RECOVERY - ADDTL 15 MIN: Performed by: THORACIC SURGERY (CARDIOTHORACIC VASCULAR SURGERY)

## 2025-04-04 PROCEDURE — 82948 REAGENT STRIP/BLOOD GLUCOSE: CPT

## 2025-04-04 PROCEDURE — C1729 CATH, DRAINAGE: HCPCS | Performed by: THORACIC SURGERY (CARDIOTHORACIC VASCULAR SURGERY)

## 2025-04-04 PROCEDURE — 85018 HEMOGLOBIN: CPT

## 2025-04-04 PROCEDURE — 2500000003 HC RX 250 WO HCPCS: Performed by: PHYSICIAN ASSISTANT

## 2025-04-04 PROCEDURE — 6370000000 HC RX 637 (ALT 250 FOR IP): Performed by: PHYSICIAN ASSISTANT

## 2025-04-04 PROCEDURE — 2500000003 HC RX 250 WO HCPCS: Performed by: THORACIC SURGERY (CARDIOTHORACIC VASCULAR SURGERY)

## 2025-04-04 PROCEDURE — 7100000000 HC PACU RECOVERY - FIRST 15 MIN: Performed by: THORACIC SURGERY (CARDIOTHORACIC VASCULAR SURGERY)

## 2025-04-04 PROCEDURE — 82803 BLOOD GASES ANY COMBINATION: CPT

## 2025-04-04 PROCEDURE — 99232 SBSQ HOSP IP/OBS MODERATE 35: CPT

## 2025-04-04 PROCEDURE — 6360000002 HC RX W HCPCS: Performed by: PHYSICIAN ASSISTANT

## 2025-04-04 PROCEDURE — 84132 ASSAY OF SERUM POTASSIUM: CPT

## 2025-04-04 PROCEDURE — 3600000013 HC SURGERY LEVEL 3 ADDTL 15MIN: Performed by: THORACIC SURGERY (CARDIOTHORACIC VASCULAR SURGERY)

## 2025-04-04 PROCEDURE — 3700000001 HC ADD 15 MINUTES (ANESTHESIA): Performed by: THORACIC SURGERY (CARDIOTHORACIC VASCULAR SURGERY)

## 2025-04-04 PROCEDURE — 84295 ASSAY OF SERUM SODIUM: CPT

## 2025-04-04 PROCEDURE — 6370000000 HC RX 637 (ALT 250 FOR IP): Performed by: ANESTHESIOLOGY

## 2025-04-04 RX ORDER — HYDROCHLOROTHIAZIDE 12.5 MG/1
12.5 CAPSULE ORAL DAILY
Status: DISCONTINUED | OUTPATIENT
Start: 2025-04-05 | End: 2025-04-05

## 2025-04-04 RX ORDER — FENTANYL CITRATE 50 UG/ML
50 INJECTION, SOLUTION INTRAMUSCULAR; INTRAVENOUS EVERY 5 MIN PRN
Status: DISCONTINUED | OUTPATIENT
Start: 2025-04-04 | End: 2025-04-04

## 2025-04-04 RX ORDER — ALBUMIN HUMAN 50 G/1000ML
SOLUTION INTRAVENOUS
Status: DISCONTINUED | OUTPATIENT
Start: 2025-04-04 | End: 2025-04-04 | Stop reason: SDUPTHER

## 2025-04-04 RX ORDER — SODIUM CHLORIDE 0.9 % (FLUSH) 0.9 %
5-40 SYRINGE (ML) INJECTION EVERY 12 HOURS SCHEDULED
Status: DISCONTINUED | OUTPATIENT
Start: 2025-04-04 | End: 2025-04-07 | Stop reason: HOSPADM

## 2025-04-04 RX ORDER — MORPHINE SULFATE 2 MG/ML
2 INJECTION, SOLUTION INTRAMUSCULAR; INTRAVENOUS
Status: DISCONTINUED | OUTPATIENT
Start: 2025-04-04 | End: 2025-04-07 | Stop reason: HOSPADM

## 2025-04-04 RX ORDER — PROMETHAZINE HYDROCHLORIDE 25 MG/1
12.5 TABLET ORAL EVERY 6 HOURS PRN
Status: DISCONTINUED | OUTPATIENT
Start: 2025-04-04 | End: 2025-04-07 | Stop reason: HOSPADM

## 2025-04-04 RX ORDER — FENTANYL CITRATE 50 UG/ML
INJECTION, SOLUTION INTRAMUSCULAR; INTRAVENOUS
Status: COMPLETED
Start: 2025-04-04 | End: 2025-04-04

## 2025-04-04 RX ORDER — ONDANSETRON 2 MG/ML
4 INJECTION INTRAMUSCULAR; INTRAVENOUS EVERY 6 HOURS PRN
Status: DISCONTINUED | OUTPATIENT
Start: 2025-04-04 | End: 2025-04-07 | Stop reason: HOSPADM

## 2025-04-04 RX ORDER — POLYETHYLENE GLYCOL 3350 17 G
4 POWDER IN PACKET (EA) ORAL PRN
Status: DISCONTINUED | OUTPATIENT
Start: 2025-04-04 | End: 2025-04-07 | Stop reason: HOSPADM

## 2025-04-04 RX ORDER — SODIUM CHLORIDE 9 MG/ML
INJECTION, SOLUTION INTRAVENOUS PRN
Status: DISCONTINUED | OUTPATIENT
Start: 2025-04-04 | End: 2025-04-07 | Stop reason: HOSPADM

## 2025-04-04 RX ORDER — SODIUM CHLORIDE 9 MG/ML
INJECTION, SOLUTION INTRAVENOUS PRN
Status: DISCONTINUED | OUTPATIENT
Start: 2025-04-04 | End: 2025-04-04

## 2025-04-04 RX ORDER — SODIUM CHLORIDE 9 MG/ML
INJECTION, SOLUTION INTRAVENOUS CONTINUOUS
Status: DISCONTINUED | OUTPATIENT
Start: 2025-04-04 | End: 2025-04-04

## 2025-04-04 RX ORDER — INSULIN GLARGINE 100 [IU]/ML
25 INJECTION, SOLUTION SUBCUTANEOUS 2 TIMES DAILY
Status: DISCONTINUED | OUTPATIENT
Start: 2025-04-04 | End: 2025-04-05

## 2025-04-04 RX ORDER — DEXAMETHASONE SODIUM PHOSPHATE 4 MG/ML
INJECTION, SOLUTION INTRA-ARTICULAR; INTRALESIONAL; INTRAMUSCULAR; INTRAVENOUS; SOFT TISSUE
Status: DISCONTINUED | OUTPATIENT
Start: 2025-04-04 | End: 2025-04-04 | Stop reason: SDUPTHER

## 2025-04-04 RX ORDER — PROPOFOL 10 MG/ML
INJECTION, EMULSION INTRAVENOUS
Status: DISCONTINUED | OUTPATIENT
Start: 2025-04-04 | End: 2025-04-04 | Stop reason: SDUPTHER

## 2025-04-04 RX ORDER — ONDANSETRON 2 MG/ML
INJECTION INTRAMUSCULAR; INTRAVENOUS
Status: DISCONTINUED | OUTPATIENT
Start: 2025-04-04 | End: 2025-04-04 | Stop reason: SDUPTHER

## 2025-04-04 RX ORDER — ONDANSETRON 2 MG/ML
4 INJECTION INTRAMUSCULAR; INTRAVENOUS
Status: DISCONTINUED | OUTPATIENT
Start: 2025-04-04 | End: 2025-04-04

## 2025-04-04 RX ORDER — SODIUM CHLORIDE 0.9 % (FLUSH) 0.9 %
5-40 SYRINGE (ML) INJECTION PRN
Status: DISCONTINUED | OUTPATIENT
Start: 2025-04-04 | End: 2025-04-07 | Stop reason: HOSPADM

## 2025-04-04 RX ORDER — LOSARTAN POTASSIUM 50 MG/1
50 TABLET ORAL DAILY
Status: DISCONTINUED | OUTPATIENT
Start: 2025-04-05 | End: 2025-04-05

## 2025-04-04 RX ORDER — SODIUM CHLORIDE 0.9 % (FLUSH) 0.9 %
5-40 SYRINGE (ML) INJECTION EVERY 12 HOURS SCHEDULED
Status: DISCONTINUED | OUTPATIENT
Start: 2025-04-04 | End: 2025-04-04

## 2025-04-04 RX ORDER — NALOXONE HYDROCHLORIDE 0.4 MG/ML
INJECTION, SOLUTION INTRAMUSCULAR; INTRAVENOUS; SUBCUTANEOUS PRN
Status: DISCONTINUED | OUTPATIENT
Start: 2025-04-04 | End: 2025-04-04

## 2025-04-04 RX ORDER — MORPHINE SULFATE 4 MG/ML
4 INJECTION, SOLUTION INTRAMUSCULAR; INTRAVENOUS
Status: DISCONTINUED | OUTPATIENT
Start: 2025-04-04 | End: 2025-04-07 | Stop reason: HOSPADM

## 2025-04-04 RX ORDER — MORPHINE SULFATE 4 MG/ML
4 INJECTION, SOLUTION INTRAMUSCULAR; INTRAVENOUS EVERY 4 HOURS PRN
Status: DISCONTINUED | OUTPATIENT
Start: 2025-04-04 | End: 2025-04-04 | Stop reason: ALTCHOICE

## 2025-04-04 RX ORDER — ENOXAPARIN SODIUM 100 MG/ML
40 INJECTION SUBCUTANEOUS DAILY
Status: DISCONTINUED | OUTPATIENT
Start: 2025-04-05 | End: 2025-04-07 | Stop reason: HOSPADM

## 2025-04-04 RX ORDER — HYDROMORPHONE HYDROCHLORIDE 2 MG/ML
INJECTION, SOLUTION INTRAMUSCULAR; INTRAVENOUS; SUBCUTANEOUS
Status: DISCONTINUED | OUTPATIENT
Start: 2025-04-04 | End: 2025-04-04 | Stop reason: SDUPTHER

## 2025-04-04 RX ORDER — SODIUM CHLORIDE 0.9 % (FLUSH) 0.9 %
5-40 SYRINGE (ML) INJECTION PRN
Status: DISCONTINUED | OUTPATIENT
Start: 2025-04-04 | End: 2025-04-04

## 2025-04-04 RX ORDER — INSULIN LISPRO 100 [IU]/ML
0-16 INJECTION, SOLUTION INTRAVENOUS; SUBCUTANEOUS
Status: DISCONTINUED | OUTPATIENT
Start: 2025-04-04 | End: 2025-04-07 | Stop reason: HOSPADM

## 2025-04-04 RX ORDER — DIPHENHYDRAMINE HYDROCHLORIDE 50 MG/ML
12.5 INJECTION, SOLUTION INTRAMUSCULAR; INTRAVENOUS
Status: DISCONTINUED | OUTPATIENT
Start: 2025-04-04 | End: 2025-04-04

## 2025-04-04 RX ORDER — MIDAZOLAM HYDROCHLORIDE 1 MG/ML
INJECTION, SOLUTION INTRAMUSCULAR; INTRAVENOUS
Status: DISCONTINUED | OUTPATIENT
Start: 2025-04-04 | End: 2025-04-04 | Stop reason: SDUPTHER

## 2025-04-04 RX ORDER — LIDOCAINE HCL/PF 100 MG/5ML
SYRINGE (ML) INJECTION
Status: DISCONTINUED | OUTPATIENT
Start: 2025-04-04 | End: 2025-04-04 | Stop reason: SDUPTHER

## 2025-04-04 RX ORDER — ALBUTEROL SULFATE 90 UG/1
2 INHALANT RESPIRATORY (INHALATION) EVERY 6 HOURS PRN
Status: DISCONTINUED | OUTPATIENT
Start: 2025-04-04 | End: 2025-04-07 | Stop reason: HOSPADM

## 2025-04-04 RX ORDER — SODIUM CHLORIDE 9 MG/ML
INJECTION, SOLUTION INTRAVENOUS
Status: DISCONTINUED | OUTPATIENT
Start: 2025-04-04 | End: 2025-04-04 | Stop reason: SDUPTHER

## 2025-04-04 RX ORDER — ROCURONIUM BROMIDE 10 MG/ML
INJECTION, SOLUTION INTRAVENOUS
Status: DISCONTINUED | OUTPATIENT
Start: 2025-04-04 | End: 2025-04-04 | Stop reason: SDUPTHER

## 2025-04-04 RX ORDER — BUPIVACAINE HYDROCHLORIDE 5 MG/ML
INJECTION, SOLUTION PERINEURAL PRN
Status: DISCONTINUED | OUTPATIENT
Start: 2025-04-04 | End: 2025-04-04 | Stop reason: ALTCHOICE

## 2025-04-04 RX ORDER — PHENYLEPHRINE HCL IN 0.9% NACL 1 MG/10 ML
SYRINGE (ML) INTRAVENOUS
Status: DISCONTINUED | OUTPATIENT
Start: 2025-04-04 | End: 2025-04-04 | Stop reason: SDUPTHER

## 2025-04-04 RX ORDER — FENTANYL CITRATE 50 UG/ML
INJECTION, SOLUTION INTRAMUSCULAR; INTRAVENOUS
Status: DISCONTINUED | OUTPATIENT
Start: 2025-04-04 | End: 2025-04-04 | Stop reason: SDUPTHER

## 2025-04-04 RX ADMIN — MORPHINE SULFATE 4 MG: 4 INJECTION, SOLUTION INTRAMUSCULAR; INTRAVENOUS at 16:10

## 2025-04-04 RX ADMIN — ATORVASTATIN CALCIUM 80 MG: 80 TABLET, FILM COATED ORAL at 21:45

## 2025-04-04 RX ADMIN — SUGAMMADEX 400 MG: 100 INJECTION, SOLUTION INTRAVENOUS at 11:01

## 2025-04-04 RX ADMIN — FENTANYL CITRATE 50 MCG: 50 INJECTION, SOLUTION INTRAMUSCULAR; INTRAVENOUS at 11:40

## 2025-04-04 RX ADMIN — DOCUSATE SODIUM 100 MG: 100 CAPSULE, LIQUID FILLED ORAL at 19:53

## 2025-04-04 RX ADMIN — PHENYLEPHRINE HYDROCHLORIDE 30 MCG/MIN: 10 INJECTION INTRAVENOUS at 09:35

## 2025-04-04 RX ADMIN — SODIUM CHLORIDE: 0.9 INJECTION, SOLUTION INTRAVENOUS at 14:32

## 2025-04-04 RX ADMIN — ONDANSETRON 4 MG: 2 INJECTION INTRAMUSCULAR; INTRAVENOUS at 20:36

## 2025-04-04 RX ADMIN — Medication 100 MG: at 07:40

## 2025-04-04 RX ADMIN — Medication 100 MCG: at 09:03

## 2025-04-04 RX ADMIN — DOCUSATE SODIUM 100 MG: 100 CAPSULE, LIQUID FILLED ORAL at 14:26

## 2025-04-04 RX ADMIN — POLYETHYLENE GLYCOL 3350 17 G: 17 POWDER, FOR SOLUTION ORAL at 14:37

## 2025-04-04 RX ADMIN — INSULIN GLARGINE 25 UNITS: 100 INJECTION, SOLUTION SUBCUTANEOUS at 21:45

## 2025-04-04 RX ADMIN — PROPOFOL 50 MG: 10 INJECTION, EMULSION INTRAVENOUS at 08:29

## 2025-04-04 RX ADMIN — PROPOFOL 150 MG: 10 INJECTION, EMULSION INTRAVENOUS at 07:40

## 2025-04-04 RX ADMIN — FENTANYL CITRATE 50 MCG: 50 INJECTION, SOLUTION INTRAMUSCULAR; INTRAVENOUS at 11:35

## 2025-04-04 RX ADMIN — ROCURONIUM BROMIDE 20 MG: 10 INJECTION, SOLUTION INTRAVENOUS at 09:46

## 2025-04-04 RX ADMIN — INSULIN LISPRO 4 UNITS: 100 INJECTION, SOLUTION INTRAVENOUS; SUBCUTANEOUS at 17:12

## 2025-04-04 RX ADMIN — ROCURONIUM BROMIDE 20 MG: 10 INJECTION, SOLUTION INTRAVENOUS at 08:45

## 2025-04-04 RX ADMIN — WATER 2000 MG: 1 INJECTION INTRAMUSCULAR; INTRAVENOUS; SUBCUTANEOUS at 16:14

## 2025-04-04 RX ADMIN — SODIUM CHLORIDE 29 MCG: 9 INJECTION, SOLUTION INTRAVENOUS at 08:10

## 2025-04-04 RX ADMIN — Medication 100 MCG: at 10:17

## 2025-04-04 RX ADMIN — SODIUM CHLORIDE 15 MCG: 9 INJECTION, SOLUTION INTRAVENOUS at 10:53

## 2025-04-04 RX ADMIN — WATER 2000 MG: 1 INJECTION INTRAMUSCULAR; INTRAVENOUS; SUBCUTANEOUS at 07:50

## 2025-04-04 RX ADMIN — ONDANSETRON 4 MG: 2 INJECTION, SOLUTION INTRAMUSCULAR; INTRAVENOUS at 07:50

## 2025-04-04 RX ADMIN — ALBUMIN (HUMAN) 12.5 G: 12.5 SOLUTION INTRAVENOUS at 08:00

## 2025-04-04 RX ADMIN — SODIUM CHLORIDE: 9 INJECTION, SOLUTION INTRAVENOUS at 07:32

## 2025-04-04 RX ADMIN — ACETAMINOPHEN 1000 MG: 500 TABLET ORAL at 19:53

## 2025-04-04 RX ADMIN — Medication 200 MCG: at 07:49

## 2025-04-04 RX ADMIN — TRAZODONE HYDROCHLORIDE 50 MG: 50 TABLET ORAL at 23:29

## 2025-04-04 RX ADMIN — ACETAMINOPHEN 1000 MG: 500 TABLET ORAL at 05:12

## 2025-04-04 RX ADMIN — FENTANYL CITRATE 50 MCG: 50 INJECTION, SOLUTION INTRAMUSCULAR; INTRAVENOUS at 07:40

## 2025-04-04 RX ADMIN — Medication 200 MCG: at 07:55

## 2025-04-04 RX ADMIN — OXYCODONE 5 MG: 5 TABLET ORAL at 19:53

## 2025-04-04 RX ADMIN — FENTANYL CITRATE 50 MCG: 50 INJECTION, SOLUTION INTRAMUSCULAR; INTRAVENOUS at 13:20

## 2025-04-04 RX ADMIN — FENTANYL CITRATE 50 MCG: 50 INJECTION, SOLUTION INTRAMUSCULAR; INTRAVENOUS at 08:11

## 2025-04-04 RX ADMIN — OXYCODONE 5 MG: 5 TABLET ORAL at 14:26

## 2025-04-04 RX ADMIN — ACETAMINOPHEN 1000 MG: 500 TABLET ORAL at 14:26

## 2025-04-04 RX ADMIN — Medication 100 MCG: at 09:34

## 2025-04-04 RX ADMIN — ASPIRIN 81 MG: 81 TABLET, CHEWABLE ORAL at 14:32

## 2025-04-04 RX ADMIN — HYDROMORPHONE HYDROCHLORIDE 0.5 MG: 2 INJECTION INTRAMUSCULAR; INTRAVENOUS; SUBCUTANEOUS at 08:37

## 2025-04-04 RX ADMIN — ALBUMIN (HUMAN) 12.5 G: 12.5 SOLUTION INTRAVENOUS at 08:40

## 2025-04-04 RX ADMIN — SODIUM CHLORIDE 4 MCG: 9 INJECTION, SOLUTION INTRAVENOUS at 08:29

## 2025-04-04 RX ADMIN — INSULIN HUMAN 6 UNITS: 100 INJECTION, SOLUTION PARENTERAL at 11:52

## 2025-04-04 RX ADMIN — DEXAMETHASONE SODIUM PHOSPHATE 10 MG: 4 INJECTION, SOLUTION INTRAMUSCULAR; INTRAVENOUS at 07:50

## 2025-04-04 RX ADMIN — Medication 100 MCG: at 09:13

## 2025-04-04 RX ADMIN — MORPHINE SULFATE 4 MG: 4 INJECTION, SOLUTION INTRAMUSCULAR; INTRAVENOUS at 23:24

## 2025-04-04 RX ADMIN — ROCURONIUM BROMIDE 100 MG: 10 INJECTION, SOLUTION INTRAVENOUS at 07:40

## 2025-04-04 RX ADMIN — HYDROMORPHONE HYDROCHLORIDE 0.5 MG: 1 INJECTION, SOLUTION INTRAMUSCULAR; INTRAVENOUS; SUBCUTANEOUS at 11:52

## 2025-04-04 RX ADMIN — INSULIN LISPRO 8 UNITS: 100 INJECTION, SOLUTION INTRAVENOUS; SUBCUTANEOUS at 20:35

## 2025-04-04 RX ADMIN — MIDAZOLAM 2 MG: 1 INJECTION INTRAMUSCULAR; INTRAVENOUS at 07:35

## 2025-04-04 RX ADMIN — HYDROMORPHONE HYDROCHLORIDE 1 MG: 2 INJECTION INTRAMUSCULAR; INTRAVENOUS; SUBCUTANEOUS at 11:01

## 2025-04-04 RX ADMIN — HYDROMORPHONE HYDROCHLORIDE 0.5 MG: 2 INJECTION INTRAMUSCULAR; INTRAVENOUS; SUBCUTANEOUS at 10:10

## 2025-04-04 RX ADMIN — SODIUM CHLORIDE, PRESERVATIVE FREE 10 ML: 5 INJECTION INTRAVENOUS at 19:55

## 2025-04-04 RX ADMIN — HYDROMORPHONE HYDROCHLORIDE 0.5 MG: 1 INJECTION, SOLUTION INTRAMUSCULAR; INTRAVENOUS; SUBCUTANEOUS at 12:00

## 2025-04-04 ASSESSMENT — PAIN DESCRIPTION - DESCRIPTORS
DESCRIPTORS: ACHING;SHARP
DESCRIPTORS: ACHING;SHARP
DESCRIPTORS: STABBING
DESCRIPTORS: SHARP
DESCRIPTORS: ACHING;SHARP
DESCRIPTORS: ACHING;SORE

## 2025-04-04 ASSESSMENT — PAIN DESCRIPTION - LOCATION
LOCATION: CHEST;SHOULDER
LOCATION: CHEST
LOCATION: BACK;CHEST

## 2025-04-04 ASSESSMENT — PAIN DESCRIPTION - ONSET
ONSET: ON-GOING

## 2025-04-04 ASSESSMENT — PAIN SCALES - GENERAL
PAINLEVEL_OUTOF10: 10
PAINLEVEL_OUTOF10: 9
PAINLEVEL_OUTOF10: 7
PAINLEVEL_OUTOF10: 8
PAINLEVEL_OUTOF10: 6
PAINLEVEL_OUTOF10: 8
PAINLEVEL_OUTOF10: 6
PAINLEVEL_OUTOF10: 3
PAINLEVEL_OUTOF10: 5
PAINLEVEL_OUTOF10: 10
PAINLEVEL_OUTOF10: 6
PAINLEVEL_OUTOF10: 10
PAINLEVEL_OUTOF10: 3
PAINLEVEL_OUTOF10: 8

## 2025-04-04 ASSESSMENT — PAIN DESCRIPTION - FREQUENCY
FREQUENCY: CONTINUOUS

## 2025-04-04 ASSESSMENT — PAIN SCALES - WONG BAKER
WONGBAKER_NUMERICALRESPONSE: NO HURT
WONGBAKER_NUMERICALRESPONSE: HURTS LITTLE MORE

## 2025-04-04 ASSESSMENT — PAIN - FUNCTIONAL ASSESSMENT
PAIN_FUNCTIONAL_ASSESSMENT: PREVENTS OR INTERFERES SOME ACTIVE ACTIVITIES AND ADLS

## 2025-04-04 ASSESSMENT — PAIN DESCRIPTION - ORIENTATION
ORIENTATION: LEFT
ORIENTATION: LEFT;POSTERIOR;ANTERIOR
ORIENTATION: LEFT
ORIENTATION: LEFT;OUTER

## 2025-04-04 ASSESSMENT — PAIN DESCRIPTION - PAIN TYPE
TYPE: SURGICAL PAIN

## 2025-04-04 ASSESSMENT — ENCOUNTER SYMPTOMS: SHORTNESS OF BREATH: 1

## 2025-04-04 NOTE — ANESTHESIA PRE PROCEDURE
Pulmonary: breath sounds clear to auscultation  (+) pneumonia:   shortness of breath:             (-) asthma and sleep apnea                           Cardiovascular:    (+) hypertension:, past MI:                  Neuro/Psych:   Negative Neuro/Psych ROS     (-) seizures, TIA and CVA            ROS comment: Psoriasis  GI/Hepatic/Renal:        (-) GERD and liver disease       Endo/Other:    (+) Diabetes (inuslin pump. pt removed dexcom, states pump will not deliver when dexcom off)Type I DM, well controlled, using insulin.    (-) hypothyroidism               Abdominal:             Vascular: negative vascular ROS.    - PVD, DVT and PE.      Other Findings:         Anesthesia Plan      MAC     ASA 3     (YASMEEN. PIV. Additional access can be obtained after induction if needed. Standard ASA monitors. IV/PO opioids and other adjuncts as needed for pain control.     Possible anesthetics complications were discussed with the patient, including but not limited to: PONV, damage to the airway and surrounding structures (teeth, lips, gums, tongue, etc.), adverse reactions to medicine, cardiac complications (MI, CHF, arrhythmias, etc.), respiratory complications (post-op ventilation, respiratory failure, etc.), neurologic complications (nerve damage, stroke, seizure), and death. The patient was given the opportunity to ask questions and all questions were answered to the patient's satisfaction. The patient is in agreement with the anesthetic plan.  )  Induction: intravenous.    MIPS: Prophylactic antiemetics administered.  Anesthetic plan and risks discussed with patient.      Plan discussed with CRNA.                  Chris Brown, DO   4/4/2025

## 2025-04-04 NOTE — ANESTHESIA POSTPROCEDURE EVALUATION
Department of Anesthesiology  Postprocedure Note    Patient: Dona Kaur  MRN: [id number]  YOB: 1960  Date of evaluation: 4/4/2025    Procedure Summary       Date: 04/04/25 Room / Location: [facility] OR 06 / [facility] OR    Anesthesia Start: 0732 Anesthesia Stop: 1128    Procedure: LEFT THORACOTOMY WITH DECORTICATION (Left: Chest) Diagnosis:       ST elevation myocardial infarction (STEMI), unspecified artery (HCC)      (ST elevation myocardial infarction (STEMI), unspecified artery (HCC) [I21.3])    Surgeons: Gavin Rose MD Responsible Provider: Chris Brown DO    Anesthesia Type: General ASA Status: 3            Anesthesia Type: General    Jocelyne Phase I: Jocelyne Score: 9    Jocelyne Phase II:      Anesthesia Post Evaluation    Patient location during evaluation: PACU  Patient participation: complete - patient participated  Level of consciousness: awake and alert  Airway patency: patent  Nausea & Vomiting: no vomiting and no nausea  Cardiovascular status: hemodynamically stable  Respiratory status: acceptable and nasal cannula  Hydration status: stable  Pain management: adequate      There were no known notable events for this encounter.

## 2025-04-04 NOTE — ANESTHESIA PROCEDURE NOTES
Arterial Line:    An arterial line was placed using ultrasound guidance, in the OR for the following indication(s): continuous blood pressure monitoring and blood sampling needed.    A 20 gauge (size), 1 and 3/8 inch (length), Angiocath (type) catheter was placed, Seldinger technique used, into the right radial artery, secured by tape and Tegaderm.  Anesthesia type: General    Events:  patient tolerated procedure well with no complications.4/4/2025 7:45 AM4/4/2025 7:50 AM  Anesthesiologist: Chris Brown DO  Performed: Anesthesiologist   Preanesthetic Checklist  Completed: patient identified, IV checked, site marked, risks and benefits discussed, surgical/procedural consents, equipment checked, pre-op evaluation, timeout performed, anesthesia consent given, oxygen available, monitors applied/VS acknowledged, fire risk safety assessment completed and verbalized and blood product R/B/A discussed and consented

## 2025-04-04 NOTE — RT PROTOCOL NOTE
increased work of breathing using Per Protocol order mode.     RT to enter RT Home Evaluation for COPD & MDI Assessment order using Per Protocol order mode.    Electronically signed by Ravi Montero RCP on 4/4/2025 at 2:53 PM

## 2025-04-04 NOTE — OP NOTE
DATE: 04/04/25    PATIENT: Dona Kaur    MRN: [id number]     Acct: [id number]    PREOP DIAGNOSIS:   Loculated effusion left chest    Postop diagnosis:  Loculated effusion left chest    Procedure:  Left thoracotomy  Complete decortication left lung    SURGEON:  Gavin Rose MD    ASSISTANT: Omari Stokes PA-C    INDICATION:  The patient is a 64 y.o. year-old male who presents with a loculated effusion of the left chest, refractory to percutaneous thrombolytics, with resulting lung entrapment    FINDNGS: An extensive and densely adherent peel covered the visceral pleura of the left lung.     ESTIMATED BLOOD LOSS: 100 ml.    DESCRIPTION:  The patient was prepped and draped in sterile fashion in the right lateral decubitus position. A formal time-out was performed. A left posterolateral thoracotomy skin incision was made, with continuation through the latissimus and sparing of the serratus. The chest was entered without lung injury in the 5th ICS. The intercostal neurovascular bundle was mobilized to keep the intercostal nerve intact and uncompressed. With the usual elevation of the periosteum, the sixth rib was partially resected.  The chest was entered, and a retractor was placed.      As noted,  an extensive and densely adherent peel covered the visceral pleura of the left lung. An plane was established to mobilize completely the entire. Using an endarterectomy knife, a meticulous decortication was performed of the entire lung.  Complete expansion of the lung was verified on Valsalva maneuver; no air leaks were verified with the lung inflated. The chest was drained with two 32 Fr tubes introduced through separate incisions.    The wound was closed using 0 looped PDS for the intercostals, 2-0 PDS for the muscle, 3-0 vicryl for the subcutaneous tissue, and 4-0 for the skin.  The patient tolerated the procedure well, and transferred to the recovery room in stable condition.    DUE TO THE COMPLEXITY OF THE CASE, THE FIRST 
Department of Radiology  Post Procedure Progress Note      Pre-Procedure Diagnosis:  Loculated left pleural effusion    Procedure Performed:  CT guided chest tube placement    Anesthesia: local / versed and fentanyl    Findings: successful    Immediate Complications:  None    Estimated Blood Loss: minimal    SEE DICTATED PROCEDURE NOTE FOR COMPLETE DETAILS.    Electronically signed by Ted Mcmahan MD on 3/27/2025 at 1:41 PM  
JOSUE Munoz MD    Electronically signed by Susana Munoz MD on 4/2/2025 at 8:02 AM

## 2025-04-05 ENCOUNTER — APPOINTMENT (OUTPATIENT)
Dept: GENERAL RADIOLOGY | Age: 65
DRG: 121 | End: 2025-04-05
Attending: INTERNAL MEDICINE
Payer: COMMERCIAL

## 2025-04-05 LAB
ANION GAP SERPL CALC-SCNC: 12 MEQ/L (ref 8–16)
BUN SERPL-MCNC: 22 MG/DL (ref 8–23)
CA-I BLD ISE-SCNC: 1.13 MMOL/L (ref 1.12–1.32)
CALCIUM SERPL-MCNC: 7.7 MG/DL (ref 8.8–10.2)
CHLORIDE SERPL-SCNC: 101 MEQ/L (ref 98–111)
CMV DNA SPEC QL NAA+PROBE: NOT DETECTED
CO2 SERPL-SCNC: 21 MEQ/L (ref 22–29)
CREAT SERPL-MCNC: 0.8 MG/DL (ref 0.7–1.2)
DEPRECATED RDW RBC AUTO: 48.6 FL (ref 35–45)
ERYTHROCYTE [DISTWIDTH] IN BLOOD BY AUTOMATED COUNT: 15.4 % (ref 11.5–14.5)
GFR SERPL CREATININE-BSD FRML MDRD: > 90 ML/MIN/1.73M2
GLUCOSE BLD STRIP.AUTO-MCNC: 163 MG/DL (ref 70–108)
GLUCOSE BLD STRIP.AUTO-MCNC: 184 MG/DL (ref 70–108)
GLUCOSE BLD STRIP.AUTO-MCNC: 263 MG/DL (ref 70–108)
GLUCOSE BLD STRIP.AUTO-MCNC: 65 MG/DL (ref 70–108)
GLUCOSE BLD STRIP.AUTO-MCNC: 75 MG/DL (ref 70–108)
GLUCOSE SERPL-MCNC: 235 MG/DL (ref 74–109)
HCT VFR BLD AUTO: 24.4 % (ref 42–52)
HGB BLD-MCNC: 8 GM/DL (ref 14–18)
HSV1 DNA SPEC QL NAA+PROBE: NOT DETECTED
HSV2 DNA SPEC QL NAA+PROBE: NOT DETECTED
MCH RBC QN AUTO: 28.3 PG (ref 26–33)
MCHC RBC AUTO-ENTMCNC: 32.8 GM/DL (ref 32.2–35.5)
MCV RBC AUTO: 86.2 FL (ref 80–94)
PLATELET # BLD AUTO: 558 THOU/MM3 (ref 130–400)
PMV BLD AUTO: 9.1 FL (ref 9.4–12.4)
POTASSIUM SERPL-SCNC: 4.5 MEQ/L (ref 3.5–5.2)
RBC # BLD AUTO: 2.83 MILL/MM3 (ref 4.7–6.1)
SODIUM SERPL-SCNC: 134 MEQ/L (ref 135–145)
SPECIMEN SOURCE: NORMAL
WBC # BLD AUTO: 15.9 THOU/MM3 (ref 4.8–10.8)

## 2025-04-05 PROCEDURE — 6370000000 HC RX 637 (ALT 250 FOR IP): Performed by: THORACIC SURGERY (CARDIOTHORACIC VASCULAR SURGERY)

## 2025-04-05 PROCEDURE — 6370000000 HC RX 637 (ALT 250 FOR IP): Performed by: STUDENT IN AN ORGANIZED HEALTH CARE EDUCATION/TRAINING PROGRAM

## 2025-04-05 PROCEDURE — 36415 COLL VENOUS BLD VENIPUNCTURE: CPT

## 2025-04-05 PROCEDURE — 6360000002 HC RX W HCPCS: Performed by: PHYSICIAN ASSISTANT

## 2025-04-05 PROCEDURE — 85027 COMPLETE CBC AUTOMATED: CPT

## 2025-04-05 PROCEDURE — 80048 BASIC METABOLIC PNL TOTAL CA: CPT

## 2025-04-05 PROCEDURE — 6360000002 HC RX W HCPCS: Performed by: THORACIC SURGERY (CARDIOTHORACIC VASCULAR SURGERY)

## 2025-04-05 PROCEDURE — 2500000003 HC RX 250 WO HCPCS: Performed by: PHYSICIAN ASSISTANT

## 2025-04-05 PROCEDURE — 71045 X-RAY EXAM CHEST 1 VIEW: CPT

## 2025-04-05 PROCEDURE — 82330 ASSAY OF CALCIUM: CPT

## 2025-04-05 PROCEDURE — 2500000003 HC RX 250 WO HCPCS: Performed by: THORACIC SURGERY (CARDIOTHORACIC VASCULAR SURGERY)

## 2025-04-05 PROCEDURE — 2060000000 HC ICU INTERMEDIATE R&B

## 2025-04-05 PROCEDURE — 99233 SBSQ HOSP IP/OBS HIGH 50: CPT | Performed by: STUDENT IN AN ORGANIZED HEALTH CARE EDUCATION/TRAINING PROGRAM

## 2025-04-05 PROCEDURE — 94669 MECHANICAL CHEST WALL OSCILL: CPT

## 2025-04-05 PROCEDURE — 82948 REAGENT STRIP/BLOOD GLUCOSE: CPT

## 2025-04-05 RX ORDER — LISINOPRIL 20 MG/1
20 TABLET ORAL DAILY
Status: DISCONTINUED | OUTPATIENT
Start: 2025-04-05 | End: 2025-04-05

## 2025-04-05 RX ORDER — INSULIN GLARGINE 100 [IU]/ML
27 INJECTION, SOLUTION SUBCUTANEOUS 2 TIMES DAILY
Status: DISCONTINUED | OUTPATIENT
Start: 2025-04-05 | End: 2025-04-05

## 2025-04-05 RX ORDER — LISINOPRIL 20 MG/1
20 TABLET ORAL ONCE
Status: COMPLETED | OUTPATIENT
Start: 2025-04-05 | End: 2025-04-05

## 2025-04-05 RX ORDER — INSULIN GLARGINE 100 [IU]/ML
25 INJECTION, SOLUTION SUBCUTANEOUS ONCE
Status: COMPLETED | OUTPATIENT
Start: 2025-04-05 | End: 2025-04-05

## 2025-04-05 RX ORDER — INSULIN GLARGINE 100 [IU]/ML
50 INJECTION, SOLUTION SUBCUTANEOUS 2 TIMES DAILY
Status: DISCONTINUED | OUTPATIENT
Start: 2025-04-05 | End: 2025-04-06

## 2025-04-05 RX ORDER — SENNOSIDES A AND B 8.6 MG/1
1 TABLET, FILM COATED ORAL 2 TIMES DAILY
Status: DISCONTINUED | OUTPATIENT
Start: 2025-04-05 | End: 2025-04-07 | Stop reason: HOSPADM

## 2025-04-05 RX ORDER — LISINOPRIL 40 MG/1
40 TABLET ORAL DAILY
Status: DISCONTINUED | OUTPATIENT
Start: 2025-04-06 | End: 2025-04-07 | Stop reason: HOSPADM

## 2025-04-05 RX ORDER — POTASSIUM CHLORIDE 1500 MG/1
20 TABLET, EXTENDED RELEASE ORAL
Status: DISCONTINUED | OUTPATIENT
Start: 2025-04-06 | End: 2025-04-06

## 2025-04-05 RX ORDER — FUROSEMIDE 10 MG/ML
20 INJECTION INTRAMUSCULAR; INTRAVENOUS 2 TIMES DAILY
Status: DISCONTINUED | OUTPATIENT
Start: 2025-04-05 | End: 2025-04-07 | Stop reason: HOSPADM

## 2025-04-05 RX ADMIN — LISINOPRIL 20 MG: 20 TABLET ORAL at 11:43

## 2025-04-05 RX ADMIN — ATORVASTATIN CALCIUM 80 MG: 80 TABLET, FILM COATED ORAL at 21:01

## 2025-04-05 RX ADMIN — LOSARTAN POTASSIUM 50 MG: 50 TABLET, FILM COATED ORAL at 08:49

## 2025-04-05 RX ADMIN — MORPHINE SULFATE 4 MG: 4 INJECTION, SOLUTION INTRAMUSCULAR; INTRAVENOUS at 18:53

## 2025-04-05 RX ADMIN — OXYCODONE 5 MG: 5 TABLET ORAL at 15:33

## 2025-04-05 RX ADMIN — ONDANSETRON 4 MG: 2 INJECTION INTRAMUSCULAR; INTRAVENOUS at 02:57

## 2025-04-05 RX ADMIN — ACETAMINOPHEN 1000 MG: 500 TABLET ORAL at 21:00

## 2025-04-05 RX ADMIN — SODIUM CHLORIDE, PRESERVATIVE FREE 10 ML: 5 INJECTION INTRAVENOUS at 08:49

## 2025-04-05 RX ADMIN — MORPHINE SULFATE 2 MG: 2 INJECTION, SOLUTION INTRAMUSCULAR; INTRAVENOUS at 16:50

## 2025-04-05 RX ADMIN — INSULIN LISPRO 8 UNITS: 100 INJECTION, SOLUTION INTRAVENOUS; SUBCUTANEOUS at 08:50

## 2025-04-05 RX ADMIN — FUROSEMIDE 20 MG: 10 INJECTION, SOLUTION INTRAMUSCULAR; INTRAVENOUS at 11:43

## 2025-04-05 RX ADMIN — OXYCODONE 5 MG: 5 TABLET ORAL at 08:08

## 2025-04-05 RX ADMIN — LISINOPRIL 20 MG: 20 TABLET ORAL at 21:29

## 2025-04-05 RX ADMIN — ACETAMINOPHEN 1000 MG: 500 TABLET ORAL at 15:33

## 2025-04-05 RX ADMIN — ACETAMINOPHEN 1000 MG: 500 TABLET ORAL at 08:49

## 2025-04-05 RX ADMIN — ONDANSETRON 4 MG: 2 INJECTION INTRAMUSCULAR; INTRAVENOUS at 17:16

## 2025-04-05 RX ADMIN — POLYETHYLENE GLYCOL 3350 17 G: 17 POWDER, FOR SOLUTION ORAL at 08:50

## 2025-04-05 RX ADMIN — HYDROCHLOROTHIAZIDE 12.5 MG: 12.5 CAPSULE ORAL at 08:49

## 2025-04-05 RX ADMIN — SODIUM CHLORIDE, PRESERVATIVE FREE 10 ML: 5 INJECTION INTRAVENOUS at 21:29

## 2025-04-05 RX ADMIN — ONDANSETRON 4 MG: 2 INJECTION INTRAMUSCULAR; INTRAVENOUS at 09:52

## 2025-04-05 RX ADMIN — OXYCODONE 5 MG: 5 TABLET ORAL at 02:57

## 2025-04-05 RX ADMIN — WATER 2000 MG: 1 INJECTION INTRAMUSCULAR; INTRAVENOUS; SUBCUTANEOUS at 11:43

## 2025-04-05 RX ADMIN — FUROSEMIDE 20 MG: 10 INJECTION, SOLUTION INTRAMUSCULAR; INTRAVENOUS at 18:13

## 2025-04-05 RX ADMIN — ACETAMINOPHEN 1000 MG: 500 TABLET ORAL at 02:57

## 2025-04-05 RX ADMIN — MORPHINE SULFATE 4 MG: 4 INJECTION, SOLUTION INTRAMUSCULAR; INTRAVENOUS at 11:32

## 2025-04-05 RX ADMIN — DOCUSATE SODIUM 100 MG: 100 CAPSULE, LIQUID FILLED ORAL at 08:49

## 2025-04-05 RX ADMIN — INSULIN GLARGINE 27 UNITS: 100 INJECTION, SOLUTION SUBCUTANEOUS at 09:52

## 2025-04-05 RX ADMIN — SENNOSIDES 8.6 MG: 8.6 TABLET, FILM COATED ORAL at 09:52

## 2025-04-05 RX ADMIN — ASPIRIN 81 MG: 81 TABLET, CHEWABLE ORAL at 08:49

## 2025-04-05 RX ADMIN — INSULIN LISPRO 4 UNITS: 100 INJECTION, SOLUTION INTRAVENOUS; SUBCUTANEOUS at 12:35

## 2025-04-05 RX ADMIN — Medication 12.5 MG: at 09:52

## 2025-04-05 RX ADMIN — ENOXAPARIN SODIUM 40 MG: 100 INJECTION SUBCUTANEOUS at 08:50

## 2025-04-05 RX ADMIN — INSULIN GLARGINE 25 UNITS: 100 INJECTION, SOLUTION SUBCUTANEOUS at 11:43

## 2025-04-05 ASSESSMENT — PAIN DESCRIPTION - LOCATION
LOCATION: CHEST

## 2025-04-05 ASSESSMENT — PAIN SCALES - GENERAL
PAINLEVEL_OUTOF10: 3
PAINLEVEL_OUTOF10: 5
PAINLEVEL_OUTOF10: 9
PAINLEVEL_OUTOF10: 7
PAINLEVEL_OUTOF10: 10
PAINLEVEL_OUTOF10: 6
PAINLEVEL_OUTOF10: 10
PAINLEVEL_OUTOF10: 7
PAINLEVEL_OUTOF10: 3
PAINLEVEL_OUTOF10: 10
PAINLEVEL_OUTOF10: 10
PAINLEVEL_OUTOF10: 6
PAINLEVEL_OUTOF10: 7
PAINLEVEL_OUTOF10: 4
PAINLEVEL_OUTOF10: 4
PAINLEVEL_OUTOF10: 10

## 2025-04-05 ASSESSMENT — PAIN DESCRIPTION - ONSET: ONSET: ON-GOING

## 2025-04-05 ASSESSMENT — PAIN DESCRIPTION - FREQUENCY: FREQUENCY: CONTINUOUS

## 2025-04-05 ASSESSMENT — PAIN DESCRIPTION - ORIENTATION
ORIENTATION: LEFT

## 2025-04-05 ASSESSMENT — PAIN DESCRIPTION - DESCRIPTORS
DESCRIPTORS: ACHING
DESCRIPTORS: ACHING
DESCRIPTORS: SHARP
DESCRIPTORS: SHARP
DESCRIPTORS: ACHING

## 2025-04-05 ASSESSMENT — PAIN - FUNCTIONAL ASSESSMENT
PAIN_FUNCTIONAL_ASSESSMENT: ACTIVITIES ARE NOT PREVENTED
PAIN_FUNCTIONAL_ASSESSMENT: PREVENTS OR INTERFERES SOME ACTIVE ACTIVITIES AND ADLS
PAIN_FUNCTIONAL_ASSESSMENT: PREVENTS OR INTERFERES SOME ACTIVE ACTIVITIES AND ADLS

## 2025-04-05 ASSESSMENT — PAIN DESCRIPTION - PAIN TYPE: TYPE: SURGICAL PAIN

## 2025-04-06 ENCOUNTER — APPOINTMENT (OUTPATIENT)
Dept: GENERAL RADIOLOGY | Age: 65
DRG: 121 | End: 2025-04-06
Attending: INTERNAL MEDICINE
Payer: COMMERCIAL

## 2025-04-06 LAB
ALBUMIN SERPL BCG-MCNC: 2.5 G/DL (ref 3.4–4.9)
ALP SERPL-CCNC: 200 U/L (ref 40–129)
ALT SERPL W/O P-5'-P-CCNC: 18 U/L (ref 10–50)
ANION GAP SERPL CALC-SCNC: 9 MEQ/L (ref 8–16)
AST SERPL-CCNC: 38 U/L (ref 10–50)
BILIRUB CONJ SERPL-MCNC: 0.2 MG/DL (ref 0–0.2)
BILIRUB SERPL-MCNC: < 0.2 MG/DL (ref 0.3–1.2)
BUN SERPL-MCNC: 20 MG/DL (ref 8–23)
CALCIUM SERPL-MCNC: 7.9 MG/DL (ref 8.8–10.2)
CHLORIDE SERPL-SCNC: 103 MEQ/L (ref 98–111)
CO2 SERPL-SCNC: 26 MEQ/L (ref 22–29)
CREAT SERPL-MCNC: 0.8 MG/DL (ref 0.7–1.2)
DEPRECATED RDW RBC AUTO: 49.8 FL (ref 35–45)
ERYTHROCYTE [DISTWIDTH] IN BLOOD BY AUTOMATED COUNT: 15.9 % (ref 11.5–14.5)
GFR SERPL CREATININE-BSD FRML MDRD: > 90 ML/MIN/1.73M2
GLUCOSE BLD STRIP.AUTO-MCNC: 101 MG/DL (ref 70–108)
GLUCOSE BLD STRIP.AUTO-MCNC: 127 MG/DL (ref 70–108)
GLUCOSE BLD STRIP.AUTO-MCNC: 159 MG/DL (ref 70–108)
GLUCOSE BLD STRIP.AUTO-MCNC: 204 MG/DL (ref 70–108)
GLUCOSE BLD STRIP.AUTO-MCNC: 273 MG/DL (ref 70–108)
GLUCOSE BLD STRIP.AUTO-MCNC: 74 MG/DL (ref 70–108)
GLUCOSE SERPL-MCNC: 71 MG/DL (ref 74–109)
HCT VFR BLD AUTO: 23.9 % (ref 42–52)
HGB BLD-MCNC: 7.7 GM/DL (ref 14–18)
MCH RBC QN AUTO: 27.9 PG (ref 26–33)
MCHC RBC AUTO-ENTMCNC: 32.2 GM/DL (ref 32.2–35.5)
MCV RBC AUTO: 86.6 FL (ref 80–94)
PLATELET # BLD AUTO: 571 THOU/MM3 (ref 130–400)
PMV BLD AUTO: 9.1 FL (ref 9.4–12.4)
POTASSIUM SERPL-SCNC: 3.7 MEQ/L (ref 3.5–5.2)
PROT SERPL-MCNC: 5.7 G/DL (ref 6.4–8.3)
RBC # BLD AUTO: 2.76 MILL/MM3 (ref 4.7–6.1)
SODIUM SERPL-SCNC: 138 MEQ/L (ref 135–145)
WBC # BLD AUTO: 16.4 THOU/MM3 (ref 4.8–10.8)

## 2025-04-06 PROCEDURE — 6370000000 HC RX 637 (ALT 250 FOR IP): Performed by: THORACIC SURGERY (CARDIOTHORACIC VASCULAR SURGERY)

## 2025-04-06 PROCEDURE — 2500000003 HC RX 250 WO HCPCS: Performed by: PHYSICIAN ASSISTANT

## 2025-04-06 PROCEDURE — 82248 BILIRUBIN DIRECT: CPT

## 2025-04-06 PROCEDURE — 36415 COLL VENOUS BLD VENIPUNCTURE: CPT

## 2025-04-06 PROCEDURE — 82948 REAGENT STRIP/BLOOD GLUCOSE: CPT

## 2025-04-06 PROCEDURE — 6360000002 HC RX W HCPCS: Performed by: PHYSICIAN ASSISTANT

## 2025-04-06 PROCEDURE — 6370000000 HC RX 637 (ALT 250 FOR IP): Performed by: STUDENT IN AN ORGANIZED HEALTH CARE EDUCATION/TRAINING PROGRAM

## 2025-04-06 PROCEDURE — 99233 SBSQ HOSP IP/OBS HIGH 50: CPT | Performed by: STUDENT IN AN ORGANIZED HEALTH CARE EDUCATION/TRAINING PROGRAM

## 2025-04-06 PROCEDURE — 2060000000 HC ICU INTERMEDIATE R&B

## 2025-04-06 PROCEDURE — 71045 X-RAY EXAM CHEST 1 VIEW: CPT

## 2025-04-06 PROCEDURE — 85027 COMPLETE CBC AUTOMATED: CPT

## 2025-04-06 PROCEDURE — 6360000002 HC RX W HCPCS: Performed by: THORACIC SURGERY (CARDIOTHORACIC VASCULAR SURGERY)

## 2025-04-06 PROCEDURE — 80053 COMPREHEN METABOLIC PANEL: CPT

## 2025-04-06 RX ORDER — POTASSIUM CHLORIDE 1500 MG/1
20 TABLET, EXTENDED RELEASE ORAL 2 TIMES DAILY WITH MEALS
Status: DISCONTINUED | OUTPATIENT
Start: 2025-04-06 | End: 2025-04-07 | Stop reason: HOSPADM

## 2025-04-06 RX ORDER — INSULIN GLARGINE 100 [IU]/ML
25 INJECTION, SOLUTION SUBCUTANEOUS DAILY
Status: DISCONTINUED | OUTPATIENT
Start: 2025-04-06 | End: 2025-04-06

## 2025-04-06 RX ORDER — INSULIN GLARGINE 100 [IU]/ML
10 INJECTION, SOLUTION SUBCUTANEOUS DAILY
Status: DISCONTINUED | OUTPATIENT
Start: 2025-04-06 | End: 2025-04-07 | Stop reason: HOSPADM

## 2025-04-06 RX ADMIN — ASPIRIN 81 MG: 81 TABLET, CHEWABLE ORAL at 08:32

## 2025-04-06 RX ADMIN — POLYETHYLENE GLYCOL 3350 17 G: 17 POWDER, FOR SOLUTION ORAL at 08:33

## 2025-04-06 RX ADMIN — LISINOPRIL 40 MG: 40 TABLET ORAL at 08:34

## 2025-04-06 RX ADMIN — Medication 12.5 MG: at 06:30

## 2025-04-06 RX ADMIN — ACETAMINOPHEN 1000 MG: 500 TABLET ORAL at 20:11

## 2025-04-06 RX ADMIN — POTASSIUM CHLORIDE 20 MEQ: 20 TABLET, EXTENDED RELEASE ORAL at 17:22

## 2025-04-06 RX ADMIN — OXYCODONE 5 MG: 5 TABLET ORAL at 14:32

## 2025-04-06 RX ADMIN — INSULIN LISPRO 4 UNITS: 100 INJECTION, SOLUTION INTRAVENOUS; SUBCUTANEOUS at 12:14

## 2025-04-06 RX ADMIN — OXYCODONE 5 MG: 5 TABLET ORAL at 02:16

## 2025-04-06 RX ADMIN — SODIUM CHLORIDE, PRESERVATIVE FREE 5 ML: 5 INJECTION INTRAVENOUS at 20:12

## 2025-04-06 RX ADMIN — SENNOSIDES 8.6 MG: 8.6 TABLET, FILM COATED ORAL at 20:12

## 2025-04-06 RX ADMIN — ACETAMINOPHEN 1000 MG: 500 TABLET ORAL at 08:33

## 2025-04-06 RX ADMIN — OXYCODONE 5 MG: 5 TABLET ORAL at 08:32

## 2025-04-06 RX ADMIN — FUROSEMIDE 20 MG: 10 INJECTION, SOLUTION INTRAMUSCULAR; INTRAVENOUS at 08:32

## 2025-04-06 RX ADMIN — ENOXAPARIN SODIUM 40 MG: 100 INJECTION SUBCUTANEOUS at 08:33

## 2025-04-06 RX ADMIN — SODIUM CHLORIDE, PRESERVATIVE FREE 10 ML: 5 INJECTION INTRAVENOUS at 08:33

## 2025-04-06 RX ADMIN — ACETAMINOPHEN 1000 MG: 500 TABLET ORAL at 14:32

## 2025-04-06 RX ADMIN — FUROSEMIDE 20 MG: 10 INJECTION, SOLUTION INTRAMUSCULAR; INTRAVENOUS at 17:22

## 2025-04-06 RX ADMIN — MORPHINE SULFATE 4 MG: 4 INJECTION, SOLUTION INTRAMUSCULAR; INTRAVENOUS at 06:30

## 2025-04-06 RX ADMIN — SENNOSIDES 8.6 MG: 8.6 TABLET, FILM COATED ORAL at 08:33

## 2025-04-06 RX ADMIN — MORPHINE SULFATE 4 MG: 4 INJECTION, SOLUTION INTRAMUSCULAR; INTRAVENOUS at 23:52

## 2025-04-06 RX ADMIN — OXYCODONE 5 MG: 5 TABLET ORAL at 20:12

## 2025-04-06 RX ADMIN — INSULIN LISPRO 8 UNITS: 100 INJECTION, SOLUTION INTRAVENOUS; SUBCUTANEOUS at 17:23

## 2025-04-06 RX ADMIN — DOCUSATE SODIUM 100 MG: 100 CAPSULE, LIQUID FILLED ORAL at 08:33

## 2025-04-06 RX ADMIN — INSULIN GLARGINE 10 UNITS: 100 INJECTION, SOLUTION SUBCUTANEOUS at 12:57

## 2025-04-06 RX ADMIN — ATORVASTATIN CALCIUM 80 MG: 80 TABLET, FILM COATED ORAL at 20:14

## 2025-04-06 RX ADMIN — POTASSIUM CHLORIDE 20 MEQ: 20 TABLET, EXTENDED RELEASE ORAL at 08:33

## 2025-04-06 RX ADMIN — DOCUSATE SODIUM 100 MG: 100 CAPSULE, LIQUID FILLED ORAL at 20:12

## 2025-04-06 ASSESSMENT — PAIN SCALES - GENERAL
PAINLEVEL_OUTOF10: 8
PAINLEVEL_OUTOF10: 6
PAINLEVEL_OUTOF10: 3
PAINLEVEL_OUTOF10: 6
PAINLEVEL_OUTOF10: 3
PAINLEVEL_OUTOF10: 7
PAINLEVEL_OUTOF10: 8
PAINLEVEL_OUTOF10: 3
PAINLEVEL_OUTOF10: 4
PAINLEVEL_OUTOF10: 5

## 2025-04-06 ASSESSMENT — PAIN - FUNCTIONAL ASSESSMENT
PAIN_FUNCTIONAL_ASSESSMENT: ACTIVITIES ARE NOT PREVENTED
PAIN_FUNCTIONAL_ASSESSMENT: PREVENTS OR INTERFERES SOME ACTIVE ACTIVITIES AND ADLS
PAIN_FUNCTIONAL_ASSESSMENT: ACTIVITIES ARE NOT PREVENTED

## 2025-04-06 ASSESSMENT — PAIN DESCRIPTION - DESCRIPTORS
DESCRIPTORS: ACHING
DESCRIPTORS: SHARP
DESCRIPTORS: ACHING
DESCRIPTORS: SHARP
DESCRIPTORS: ACHING

## 2025-04-06 ASSESSMENT — PAIN DESCRIPTION - LOCATION
LOCATION: CHEST

## 2025-04-06 ASSESSMENT — PAIN DESCRIPTION - ORIENTATION
ORIENTATION: LEFT

## 2025-04-07 ENCOUNTER — TELEPHONE (OUTPATIENT)
Dept: INFECTIOUS DISEASES | Age: 65
End: 2025-04-07

## 2025-04-07 ENCOUNTER — APPOINTMENT (OUTPATIENT)
Dept: GENERAL RADIOLOGY | Age: 65
DRG: 121 | End: 2025-04-07
Attending: INTERNAL MEDICINE
Payer: COMMERCIAL

## 2025-04-07 VITALS
SYSTOLIC BLOOD PRESSURE: 163 MMHG | OXYGEN SATURATION: 95 % | BODY MASS INDEX: 23.58 KG/M2 | TEMPERATURE: 98.3 F | DIASTOLIC BLOOD PRESSURE: 89 MMHG | HEART RATE: 105 BPM | HEIGHT: 71 IN | WEIGHT: 168.43 LBS | RESPIRATION RATE: 19 BRPM

## 2025-04-07 PROBLEM — Z88.0 PENICILLIN ALLERGY: Status: RESOLVED | Noted: 2025-03-28 | Resolved: 2025-04-07

## 2025-04-07 PROBLEM — Z77.22 TOBACCO SMOKE EXPOSURE: Status: RESOLVED | Noted: 2025-04-02 | Resolved: 2025-04-07

## 2025-04-07 PROBLEM — I21.4 NSTEMI (NON-ST ELEVATED MYOCARDIAL INFARCTION) (HCC): Status: RESOLVED | Noted: 2025-03-26 | Resolved: 2025-04-07

## 2025-04-07 PROBLEM — R06.02 SOB (SHORTNESS OF BREATH): Status: RESOLVED | Noted: 2025-03-27 | Resolved: 2025-04-07

## 2025-04-07 PROBLEM — E44.0 MODERATE MALNUTRITION: Status: RESOLVED | Noted: 2025-04-03 | Resolved: 2025-04-07

## 2025-04-07 PROBLEM — I24.9 ACUTE CORONARY SYNDROME (HCC): Status: RESOLVED | Noted: 2025-03-28 | Resolved: 2025-04-07

## 2025-04-07 PROBLEM — J86.9 EMPYEMA (HCC): Status: RESOLVED | Noted: 2025-03-28 | Resolved: 2025-04-07

## 2025-04-07 PROBLEM — Z98.890 S/P THORACOTOMY: Status: RESOLVED | Noted: 2025-04-04 | Resolved: 2025-04-07

## 2025-04-07 PROBLEM — J90 LOCULATED PLEURAL EFFUSION: Status: RESOLVED | Noted: 2025-03-27 | Resolved: 2025-04-07

## 2025-04-07 PROBLEM — J18.9 PNEUMONIA OF LEFT LUNG DUE TO INFECTIOUS ORGANISM: Status: RESOLVED | Noted: 2025-03-28 | Resolved: 2025-04-07

## 2025-04-07 LAB
AFB CULTURE & SMEAR: NORMAL
AFB CULTURE & SMEAR: NORMAL
ANION GAP SERPL CALC-SCNC: 7 MEQ/L (ref 8–16)
BUN SERPL-MCNC: 17 MG/DL (ref 8–23)
CALCIUM SERPL-MCNC: 7.8 MG/DL (ref 8.8–10.2)
CHLORIDE SERPL-SCNC: 100 MEQ/L (ref 98–111)
CO2 SERPL-SCNC: 27 MEQ/L (ref 22–29)
CREAT SERPL-MCNC: 0.6 MG/DL (ref 0.7–1.2)
DEPRECATED RDW RBC AUTO: 49.9 FL (ref 35–45)
ERYTHROCYTE [DISTWIDTH] IN BLOOD BY AUTOMATED COUNT: 15.7 % (ref 11.5–14.5)
FUNGUS SPEC CULT: NORMAL
FUNGUS SPEC CULT: NORMAL
FUNGUS SPEC FUNGUS STN: NORMAL
FUNGUS SPEC FUNGUS STN: NORMAL
GFR SERPL CREATININE-BSD FRML MDRD: > 90 ML/MIN/1.73M2
GLUCOSE BLD STRIP.AUTO-MCNC: 118 MG/DL (ref 70–108)
GLUCOSE BLD STRIP.AUTO-MCNC: 121 MG/DL (ref 70–108)
GLUCOSE SERPL-MCNC: 110 MG/DL (ref 74–109)
HCT VFR BLD AUTO: 23.5 % (ref 42–52)
HGB BLD-MCNC: 7.5 GM/DL (ref 14–18)
MCH RBC QN AUTO: 27.7 PG (ref 26–33)
MCHC RBC AUTO-ENTMCNC: 31.9 GM/DL (ref 32.2–35.5)
MCV RBC AUTO: 86.7 FL (ref 80–94)
PLATELET # BLD AUTO: 572 THOU/MM3 (ref 130–400)
PMV BLD AUTO: 9.2 FL (ref 9.4–12.4)
POTASSIUM SERPL-SCNC: 4.1 MEQ/L (ref 3.5–5.2)
RBC # BLD AUTO: 2.71 MILL/MM3 (ref 4.7–6.1)
SODIUM SERPL-SCNC: 134 MEQ/L (ref 135–145)
WBC # BLD AUTO: 13.1 THOU/MM3 (ref 4.8–10.8)

## 2025-04-07 PROCEDURE — APPSS30 APP SPLIT SHARED TIME 16-30 MINUTES: Performed by: PHYSICIAN ASSISTANT

## 2025-04-07 PROCEDURE — 6370000000 HC RX 637 (ALT 250 FOR IP): Performed by: STUDENT IN AN ORGANIZED HEALTH CARE EDUCATION/TRAINING PROGRAM

## 2025-04-07 PROCEDURE — 6360000002 HC RX W HCPCS: Performed by: PHYSICIAN ASSISTANT

## 2025-04-07 PROCEDURE — 6370000000 HC RX 637 (ALT 250 FOR IP): Performed by: THORACIC SURGERY (CARDIOTHORACIC VASCULAR SURGERY)

## 2025-04-07 PROCEDURE — 99232 SBSQ HOSP IP/OBS MODERATE 35: CPT | Performed by: STUDENT IN AN ORGANIZED HEALTH CARE EDUCATION/TRAINING PROGRAM

## 2025-04-07 PROCEDURE — 2500000003 HC RX 250 WO HCPCS: Performed by: PHYSICIAN ASSISTANT

## 2025-04-07 PROCEDURE — 80048 BASIC METABOLIC PNL TOTAL CA: CPT

## 2025-04-07 PROCEDURE — 99231 SBSQ HOSP IP/OBS SF/LOW 25: CPT | Performed by: STUDENT IN AN ORGANIZED HEALTH CARE EDUCATION/TRAINING PROGRAM

## 2025-04-07 PROCEDURE — 82948 REAGENT STRIP/BLOOD GLUCOSE: CPT

## 2025-04-07 PROCEDURE — 36415 COLL VENOUS BLD VENIPUNCTURE: CPT

## 2025-04-07 PROCEDURE — 6360000002 HC RX W HCPCS: Performed by: THORACIC SURGERY (CARDIOTHORACIC VASCULAR SURGERY)

## 2025-04-07 PROCEDURE — 71045 X-RAY EXAM CHEST 1 VIEW: CPT

## 2025-04-07 PROCEDURE — 85027 COMPLETE CBC AUTOMATED: CPT

## 2025-04-07 RX ORDER — OXYCODONE HYDROCHLORIDE 5 MG/1
5 TABLET ORAL EVERY 4 HOURS PRN
Qty: 30 TABLET | Refills: 0 | Status: SHIPPED | OUTPATIENT
Start: 2025-04-07 | End: 2025-04-17

## 2025-04-07 RX ORDER — CARVEDILOL 6.25 MG/1
6.25 TABLET ORAL 2 TIMES DAILY WITH MEALS
Status: DISCONTINUED | OUTPATIENT
Start: 2025-04-07 | End: 2025-04-07 | Stop reason: HOSPADM

## 2025-04-07 RX ORDER — CEFDINIR 300 MG/1
300 CAPSULE ORAL EVERY 12 HOURS SCHEDULED
Qty: 42 CAPSULE | Refills: 0 | Status: SHIPPED | OUTPATIENT
Start: 2025-04-07 | End: 2025-04-28

## 2025-04-07 RX ORDER — ATORVASTATIN CALCIUM 80 MG/1
80 TABLET, FILM COATED ORAL NIGHTLY
Qty: 30 TABLET | Refills: 3 | Status: SHIPPED | OUTPATIENT
Start: 2025-04-07

## 2025-04-07 RX ORDER — CEFDINIR 300 MG/1
300 CAPSULE ORAL EVERY 12 HOURS SCHEDULED
Status: DISCONTINUED | OUTPATIENT
Start: 2025-04-07 | End: 2025-04-07 | Stop reason: HOSPADM

## 2025-04-07 RX ORDER — LISINOPRIL 40 MG/1
40 TABLET ORAL DAILY
Qty: 30 TABLET | Refills: 3 | Status: SHIPPED | OUTPATIENT
Start: 2025-04-07

## 2025-04-07 RX ADMIN — OXYCODONE 5 MG: 5 TABLET ORAL at 03:12

## 2025-04-07 RX ADMIN — ACETAMINOPHEN 1000 MG: 500 TABLET ORAL at 02:38

## 2025-04-07 RX ADMIN — ASPIRIN 81 MG: 81 TABLET, CHEWABLE ORAL at 08:31

## 2025-04-07 RX ADMIN — ENOXAPARIN SODIUM 40 MG: 100 INJECTION SUBCUTANEOUS at 08:31

## 2025-04-07 RX ADMIN — CEFDINIR 300 MG: 300 CAPSULE ORAL at 11:31

## 2025-04-07 RX ADMIN — CARVEDILOL 6.25 MG: 6.25 TABLET, FILM COATED ORAL at 08:37

## 2025-04-07 RX ADMIN — Medication 12.5 MG: at 08:31

## 2025-04-07 RX ADMIN — SENNOSIDES 8.6 MG: 8.6 TABLET, FILM COATED ORAL at 08:31

## 2025-04-07 RX ADMIN — POTASSIUM CHLORIDE 20 MEQ: 20 TABLET, EXTENDED RELEASE ORAL at 08:31

## 2025-04-07 RX ADMIN — LISINOPRIL 40 MG: 40 TABLET ORAL at 08:31

## 2025-04-07 RX ADMIN — FUROSEMIDE 20 MG: 10 INJECTION, SOLUTION INTRAMUSCULAR; INTRAVENOUS at 08:29

## 2025-04-07 RX ADMIN — SODIUM CHLORIDE, PRESERVATIVE FREE 10 ML: 5 INJECTION INTRAVENOUS at 08:32

## 2025-04-07 RX ADMIN — DOCUSATE SODIUM 100 MG: 100 CAPSULE, LIQUID FILLED ORAL at 08:31

## 2025-04-07 ASSESSMENT — PAIN DESCRIPTION - ORIENTATION: ORIENTATION: LEFT

## 2025-04-07 ASSESSMENT — PAIN DESCRIPTION - DESCRIPTORS: DESCRIPTORS: ACHING

## 2025-04-07 ASSESSMENT — PAIN SCALES - GENERAL
PAINLEVEL_OUTOF10: 4
PAINLEVEL_OUTOF10: 8
PAINLEVEL_OUTOF10: 8
PAINLEVEL_OUTOF10: 0

## 2025-04-07 ASSESSMENT — PAIN DESCRIPTION - LOCATION: LOCATION: CHEST;ABDOMEN

## 2025-04-07 ASSESSMENT — PAIN SCALES - WONG BAKER: WONGBAKER_NUMERICALRESPONSE: HURTS LITTLE MORE

## 2025-04-07 NOTE — CARE COORDINATION
4/7/25, 11:36 AM EDT    Patient goals/plan/ treatment preferences discussed by  and .  Patient goals/plan/ treatment preferences reviewed with patient/ family.  Patient/ family verbalize understanding of discharge plan and are in agreement with goal/plan/treatment preferences.  Understanding was demonstrated using the teach back method.  AVS provided by RN at time of discharge, which includes all necessary medical information pertaining to the patients current course of illness, treatment, post-discharge goals of care, and treatment preferences.     Services At/After Discharge: None

## 2025-04-07 NOTE — PLAN OF CARE
Problem: Chronic Conditions and Co-morbidities  Goal: Patient's chronic conditions and co-morbidity symptoms are monitored and maintained or improved  3/29/2025 1939 by Madison Shah RN  Outcome: Progressing  3/29/2025 0909 by Yesenia Tyler RN  Outcome: Progressing  Flowsheets (Taken 3/29/2025 0837)  Care Plan - Patient's Chronic Conditions and Co-Morbidity Symptoms are Monitored and Maintained or Improved: Monitor and assess patient's chronic conditions and comorbid symptoms for stability, deterioration, or improvement     Problem: Discharge Planning  Goal: Discharge to home or other facility with appropriate resources  3/29/2025 1939 by Madison Shah RN  Outcome: Progressing  3/29/2025 0909 by Yesenia Tyler RN  Outcome: Progressing  Flowsheets (Taken 3/29/2025 0837)  Discharge to home or other facility with appropriate resources: Identify barriers to discharge with patient and caregiver     Problem: Pain  Goal: Verbalizes/displays adequate comfort level or baseline comfort level  3/29/2025 1939 by Madison Shah RN  Outcome: Progressing  3/29/2025 0909 by Yesenia Tyler RN  Outcome: Progressing     Problem: Safety - Adult  Goal: Free from fall injury  3/29/2025 1939 by Madison Shah RN  Outcome: Progressing  3/29/2025 0909 by Yesenia Tyler RN  Outcome: Progressing     Problem: Respiratory - Adult  Goal: Achieves optimal ventilation and oxygenation  3/29/2025 1939 by Madison Shah RN  Outcome: Progressing  3/29/2025 0909 by Yesenia Tyler RN  Outcome: Progressing     Problem: Cardiovascular - Adult  Goal: Maintains optimal cardiac output and hemodynamic stability  3/29/2025 1939 by Madison Shah RN  Outcome: Progressing  3/29/2025 0909 by Yesenia Tyler RN  Outcome: Progressing  Goal: Absence of cardiac dysrhythmias or at baseline  3/29/2025 1939 by Madison Shah RN  Outcome: Progressing  3/29/2025 0909 by Yesenia Tyler RN  Outcome: Progressing     Problem: Skin/Tissue 
  Problem: Chronic Conditions and Co-morbidities  Goal: Patient's chronic conditions and co-morbidity symptoms are monitored and maintained or improved  3/30/2025 0903 by Yesenia Tyler RN  Outcome: Progressing  Flowsheets (Taken 3/30/2025 0823)  Care Plan - Patient's Chronic Conditions and Co-Morbidity Symptoms are Monitored and Maintained or Improved: Monitor and assess patient's chronic conditions and comorbid symptoms for stability, deterioration, or improvement  3/29/2025 1939 by Madison Shah RN  Outcome: Progressing     Problem: Discharge Planning  Goal: Discharge to home or other facility with appropriate resources  3/30/2025 0903 by Yesenia Tyler RN  Outcome: Progressing  Flowsheets (Taken 3/30/2025 0823)  Discharge to home or other facility with appropriate resources: Identify barriers to discharge with patient and caregiver  3/29/2025 1939 by Madison Shah RN  Outcome: Progressing     Problem: Pain  Goal: Verbalizes/displays adequate comfort level or baseline comfort level  3/30/2025 0903 by Yesenia Tyler RN  Outcome: Progressing  3/29/2025 1939 by Madison Shah RN  Outcome: Progressing     Problem: Safety - Adult  Goal: Free from fall injury  3/30/2025 0903 by Yesenia Tyler RN  Outcome: Progressing  3/29/2025 1939 by Madison Shah RN  Outcome: Progressing     Problem: Respiratory - Adult  Goal: Achieves optimal ventilation and oxygenation  3/30/2025 0903 by Yesenia Tyler RN  Outcome: Progressing  3/29/2025 1939 by Madison Shah RN  Outcome: Progressing     Problem: Cardiovascular - Adult  Goal: Maintains optimal cardiac output and hemodynamic stability  3/30/2025 0903 by Yesenia Tyler RN  Outcome: Progressing  3/29/2025 1939 by Madison Shah RN  Outcome: Progressing  Goal: Absence of cardiac dysrhythmias or at baseline  3/30/2025 0903 by Yesenia Tyler RN  Outcome: Progressing  3/29/2025 1939 by Madison Shah RN  Outcome: Progressing     Problem: Skin/Tissue 
  Problem: Chronic Conditions and Co-morbidities  Goal: Patient's chronic conditions and co-morbidity symptoms are monitored and maintained or improved  3/30/2025 2021 by Madison Shah RN  Outcome: Progressing  3/30/2025 0903 by Yesenia Tyler RN  Outcome: Progressing  Flowsheets (Taken 3/30/2025 0823)  Care Plan - Patient's Chronic Conditions and Co-Morbidity Symptoms are Monitored and Maintained or Improved: Monitor and assess patient's chronic conditions and comorbid symptoms for stability, deterioration, or improvement     Problem: Discharge Planning  Goal: Discharge to home or other facility with appropriate resources  3/30/2025 2021 by Madison Shah RN  Outcome: Progressing  3/30/2025 0903 by Yesenia Tyler RN  Outcome: Progressing  Flowsheets (Taken 3/30/2025 0823)  Discharge to home or other facility with appropriate resources: Identify barriers to discharge with patient and caregiver     Problem: Pain  Goal: Verbalizes/displays adequate comfort level or baseline comfort level  3/30/2025 2021 by Madison Shah RN  Outcome: Progressing  3/30/2025 0903 by Yesenia Tyler RN  Outcome: Progressing     Problem: Safety - Adult  Goal: Free from fall injury  3/30/2025 2021 by Madison Shah RN  Outcome: Progressing  3/30/2025 0903 by Yesenia Tyler RN  Outcome: Progressing     Problem: Respiratory - Adult  Goal: Achieves optimal ventilation and oxygenation  3/30/2025 2021 by Madison Shah RN  Outcome: Progressing  3/30/2025 0903 by Yesenia Tyler RN  Outcome: Progressing     Problem: Cardiovascular - Adult  Goal: Maintains optimal cardiac output and hemodynamic stability  3/30/2025 2021 by Madison Shah RN  Outcome: Progressing  3/30/2025 0903 by Yesenia Tyler RN  Outcome: Progressing  Goal: Absence of cardiac dysrhythmias or at baseline  3/30/2025 2021 by Madison Shah RN  Outcome: Progressing  3/30/2025 0903 by Yesenia Tyler RN  Outcome: Progressing     Problem: Skin/Tissue 
  Problem: Chronic Conditions and Co-morbidities  Goal: Patient's chronic conditions and co-morbidity symptoms are monitored and maintained or improved  3/31/2025 2036 by Madison Shah RN  Outcome: Progressing  3/31/2025 1017 by Brenda Moraes RN  Outcome: Progressing  Flowsheets (Taken 3/31/2025 0800)  Care Plan - Patient's Chronic Conditions and Co-Morbidity Symptoms are Monitored and Maintained or Improved: Monitor and assess patient's chronic conditions and comorbid symptoms for stability, deterioration, or improvement     Problem: Discharge Planning  Goal: Discharge to home or other facility with appropriate resources  3/31/2025 2036 by Madison Shah RN  Outcome: Progressing  3/31/2025 1017 by Brenda Moraes RN  Outcome: Progressing  Flowsheets (Taken 3/31/2025 0800)  Discharge to home or other facility with appropriate resources: Identify barriers to discharge with patient and caregiver     Problem: Pain  Goal: Verbalizes/displays adequate comfort level or baseline comfort level  3/31/2025 2036 by Madison Shah RN  Outcome: Progressing  3/31/2025 1017 by rBenda Moraes RN  Outcome: Progressing  Flowsheets (Taken 3/31/2025 0821)  Verbalizes/displays adequate comfort level or baseline comfort level:   Encourage patient to monitor pain and request assistance   Assess pain using appropriate pain scale     Problem: Safety - Adult  Goal: Free from fall injury  3/31/2025 2036 by Madison Shah RN  Outcome: Progressing  3/31/2025 1017 by Brenda Moraes RN  Outcome: Progressing     Problem: Respiratory - Adult  Goal: Achieves optimal ventilation and oxygenation  3/31/2025 2036 by Madison Shah RN  Outcome: Progressing  3/31/2025 1017 by Brenda Moraes RN  Outcome: Progressing  Flowsheets (Taken 3/31/2025 0800)  Achieves optimal ventilation and oxygenation:   Assess for changes in respiratory status   Assess for changes in mentation and behavior   Position to facilitate oxygenation and minimize respiratory effort   
  Problem: Chronic Conditions and Co-morbidities  Goal: Patient's chronic conditions and co-morbidity symptoms are monitored and maintained or improved  4/7/2025 0949 by Bry Cervantes RN  Outcome: Completed     Problem: Discharge Planning  Goal: Discharge to home or other facility with appropriate resources  4/7/2025 0949 by Bry Cervantes RN  Outcome: Completed     Problem: Pain  Goal: Verbalizes/displays adequate comfort level or baseline comfort level  4/7/2025 0949 by Bry Cervantes RN  Outcome: Completed     Problem: Safety - Adult  Goal: Free from fall injury  4/7/2025 0949 by Bry Cervantes RN  Outcome: Completed     Problem: Respiratory - Adult  Goal: Achieves optimal ventilation and oxygenation  4/7/2025 0949 by Bry Cervantes RN  Outcome: Completed     Problem: Cardiovascular - Adult  Goal: Maintains optimal cardiac output and hemodynamic stability  4/7/2025 0949 by Bry Cervantes RN  Outcome: Completed     Problem: Cardiovascular - Adult  Goal: Absence of cardiac dysrhythmias or at baseline  4/7/2025 0949 by Bry Cervantes RN  Outcome: Completed     Problem: Skin/Tissue Integrity - Adult  Goal: Skin integrity remains intact  4/7/2025 0949 by Bry Cervantes RN  Outcome: Completed     Problem: Skin/Tissue Integrity - Adult  Goal: Incisions, wounds, or drain sites healing without S/S of infection  4/7/2025 0949 by Bry Cervantes RN  Outcome: Completed     Problem: Anxiety  Goal: Will report anxiety at manageable levels  Description: INTERVENTIONS:  1. Administer medication as ordered  2. Teach and rehearse alternative coping skills  3. Provide emotional support with 1:1 interaction with staff  4/7/2025 0949 by Bry Cervantes RN  Outcome: Completed     Problem: Nutrition Deficit:  Goal: Optimize nutritional status  4/7/2025 0949 by Bry Cervantes RN  Outcome: Completed     
  Problem: Chronic Conditions and Co-morbidities  Goal: Patient's chronic conditions and co-morbidity symptoms are monitored and maintained or improved  Outcome: Progressing     Problem: Discharge Planning  Goal: Discharge to home or other facility with appropriate resources  Outcome: Progressing     Problem: Pain  Goal: Verbalizes/displays adequate comfort level or baseline comfort level  Outcome: Progressing     Problem: Safety - Adult  Goal: Free from fall injury  Outcome: Progressing     Problem: Respiratory - Adult  Goal: Achieves optimal ventilation and oxygenation  Outcome: Progressing     Problem: Cardiovascular - Adult  Goal: Maintains optimal cardiac output and hemodynamic stability  Outcome: Progressing  Goal: Absence of cardiac dysrhythmias or at baseline  Outcome: Progressing     Problem: Skin/Tissue Integrity - Adult  Goal: Skin integrity remains intact  Outcome: Progressing  Goal: Incisions, wounds, or drain sites healing without S/S of infection  Outcome: Progressing     Problem: Anxiety  Goal: Will report anxiety at manageable levels  Description: INTERVENTIONS:  1. Administer medication as ordered  2. Teach and rehearse alternative coping skills  3. Provide emotional support with 1:1 interaction with staff  Outcome: Progressing     
  Problem: Chronic Conditions and Co-morbidities  Goal: Patient's chronic conditions and co-morbidity symptoms are monitored and maintained or improved  Outcome: Progressing     Problem: Discharge Planning  Goal: Discharge to home or other facility with appropriate resources  Outcome: Progressing     Problem: Pain  Goal: Verbalizes/displays adequate comfort level or baseline comfort level  Outcome: Progressing     Problem: Safety - Adult  Goal: Free from fall injury  Outcome: Progressing     Problem: Respiratory - Adult  Goal: Achieves optimal ventilation and oxygenation  Outcome: Progressing     Problem: Cardiovascular - Adult  Goal: Maintains optimal cardiac output and hemodynamic stability  Outcome: Progressing  Goal: Absence of cardiac dysrhythmias or at baseline  Outcome: Progressing     Problem: Skin/Tissue Integrity - Adult  Goal: Skin integrity remains intact  Outcome: Progressing  Goal: Incisions, wounds, or drain sites healing without S/S of infection  Outcome: Progressing     Problem: Anxiety  Goal: Will report anxiety at manageable levels  Description: INTERVENTIONS:  1. Administer medication as ordered  2. Teach and rehearse alternative coping skills  3. Provide emotional support with 1:1 interaction with staff  Outcome: Progressing     
  Problem: Chronic Conditions and Co-morbidities  Goal: Patient's chronic conditions and co-morbidity symptoms are monitored and maintained or improved  Outcome: Progressing     Problem: Discharge Planning  Goal: Discharge to home or other facility with appropriate resources  Outcome: Progressing     Problem: Pain  Goal: Verbalizes/displays adequate comfort level or baseline comfort level  Outcome: Progressing     Problem: Safety - Adult  Goal: Free from fall injury  Outcome: Progressing     Problem: Respiratory - Adult  Goal: Achieves optimal ventilation and oxygenation  Outcome: Progressing     Problem: Cardiovascular - Adult  Goal: Maintains optimal cardiac output and hemodynamic stability  Outcome: Progressing  Goal: Absence of cardiac dysrhythmias or at baseline  Outcome: Progressing     Problem: Skin/Tissue Integrity - Adult  Goal: Skin integrity remains intact  Outcome: Progressing  Goal: Incisions, wounds, or drain sites healing without S/S of infection  Outcome: Progressing     Problem: Anxiety  Goal: Will report anxiety at manageable levels  Description: INTERVENTIONS:  1. Administer medication as ordered  2. Teach and rehearse alternative coping skills  3. Provide emotional support with 1:1 interaction with staff  Outcome: Progressing     Problem: Nutrition Deficit:  Goal: Optimize nutritional status  Outcome: Progressing     
  Problem: Chronic Conditions and Co-morbidities  Goal: Patient's chronic conditions and co-morbidity symptoms are monitored and maintained or improved  Outcome: Progressing     Problem: Discharge Planning  Goal: Discharge to home or other facility with appropriate resources  Outcome: Progressing     Problem: Pain  Goal: Verbalizes/displays adequate comfort level or baseline comfort level  Outcome: Progressing     Problem: Safety - Adult  Goal: Free from fall injury  Outcome: Progressing     Problem: Respiratory - Adult  Goal: Achieves optimal ventilation and oxygenation  Outcome: Progressing  Flowsheets (Taken 4/4/2025 1453 by Ravi Montero RCP)  Achieves optimal ventilation and oxygenation: Assess for changes in respiratory status     Problem: Cardiovascular - Adult  Goal: Maintains optimal cardiac output and hemodynamic stability  Outcome: Progressing  Goal: Absence of cardiac dysrhythmias or at baseline  Outcome: Progressing     Problem: Skin/Tissue Integrity - Adult  Goal: Skin integrity remains intact  Outcome: Progressing  Goal: Incisions, wounds, or drain sites healing without S/S of infection  Outcome: Progressing     Problem: Anxiety  Goal: Will report anxiety at manageable levels  Description: INTERVENTIONS:  1. Administer medication as ordered  2. Teach and rehearse alternative coping skills  3. Provide emotional support with 1:1 interaction with staff  Outcome: Progressing     Problem: Nutrition Deficit:  Goal: Optimize nutritional status  Outcome: Progressing   Care plan reviewed with patient.  Patient verbalize understanding of the plan of care and contribute to goal setting.      
  Problem: Chronic Conditions and Co-morbidities  Goal: Patient's chronic conditions and co-morbidity symptoms are monitored and maintained or improved  Outcome: Progressing  Flowsheets (Taken 4/2/2025 2000)  Care Plan - Patient's Chronic Conditions and Co-Morbidity Symptoms are Monitored and Maintained or Improved: Monitor and assess patient's chronic conditions and comorbid symptoms for stability, deterioration, or improvement     Problem: Discharge Planning  Goal: Discharge to home or other facility with appropriate resources  Outcome: Progressing  Flowsheets (Taken 4/2/2025 2000)  Discharge to home or other facility with appropriate resources: Identify barriers to discharge with patient and caregiver     Problem: Pain  Goal: Verbalizes/displays adequate comfort level or baseline comfort level  Outcome: Progressing     Problem: Safety - Adult  Goal: Free from fall injury  Outcome: Progressing     Problem: Respiratory - Adult  Goal: Achieves optimal ventilation and oxygenation  Outcome: Progressing  Flowsheets (Taken 4/2/2025 2000)  Achieves optimal ventilation and oxygenation: Assess for changes in respiratory status     Problem: Cardiovascular - Adult  Goal: Maintains optimal cardiac output and hemodynamic stability  Outcome: Progressing  Flowsheets (Taken 4/2/2025 2000)  Maintains optimal cardiac output and hemodynamic stability: Monitor blood pressure and heart rate  Goal: Absence of cardiac dysrhythmias or at baseline  Outcome: Progressing  Flowsheets (Taken 4/2/2025 2000)  Absence of cardiac dysrhythmias or at baseline: Monitor cardiac rate and rhythm     Problem: Skin/Tissue Integrity - Adult  Goal: Skin integrity remains intact  Outcome: Progressing  Flowsheets (Taken 4/2/2025 2000)  Skin Integrity Remains Intact: Monitor for areas of redness and/or skin breakdown  Goal: Incisions, wounds, or drain sites healing without S/S of infection  Outcome: Progressing  Flowsheets (Taken 4/2/2025 2000)  Incisions, 
  Problem: Chronic Conditions and Co-morbidities  Goal: Patient's chronic conditions and co-morbidity symptoms are monitored and maintained or improved  Recent Flowsheet Documentation  Taken 3/26/2025 2035 by Lisa Summers RN  Care Plan - Patient's Chronic Conditions and Co-Morbidity Symptoms are Monitored and Maintained or Improved: Monitor and assess patient's chronic conditions and comorbid symptoms for stability, deterioration, or improvement     Problem: Discharge Planning  Goal: Discharge to home or other facility with appropriate resources  Recent Flowsheet Documentation  Taken 3/26/2025 2035 by Lisa Summers RN  Discharge to home or other facility with appropriate resources: Identify barriers to discharge with patient and caregiver     
  Problem: Skin/Tissue Integrity - Adult  Goal: Skin integrity remains intact  Outcome: Progressing  Flowsheets  Taken 4/5/2025 2256 by Ariela Patricia RN  Skin Integrity Remains Intact: Monitor for areas of redness and/or skin breakdown  Taken 4/5/2025 1736 by Bry Cervantes RN  Skin Integrity Remains Intact: Monitor for areas of redness and/or skin breakdown     Problem: Skin/Tissue Integrity - Adult  Goal: Incisions, wounds, or drain sites healing without S/S of infection  Outcome: Progressing  Flowsheets (Taken 4/5/2025 2256)  Incisions, Wounds, or Drain Sites Healing Without Sign and Symptoms of Infection: TWICE DAILY: Assess and document skin integrity     Problem: Anxiety  Goal: Will report anxiety at manageable levels  Description: INTERVENTIONS:  1. Administer medication as ordered  2. Teach and rehearse alternative coping skills  3. Provide emotional support with 1:1 interaction with staff  Outcome: Progressing  Flowsheets (Taken 4/5/2025 2256)  Will report anxiety at manageable levels: Administer medication as ordered     Problem: Nutrition Deficit:  Goal: Optimize nutritional status  Outcome: Progressing  Flowsheets (Taken 4/5/2025 2256)  Nutrient intake appropriate for improving, restoring, or maintaining nutritional needs: Assess nutritional status and recommend course of action   Care plan reviewed with patient.  Patient verbalize understanding of the plan of care and contribute to goal setting.      
Integrity - Adult  Goal: Skin integrity remains intact  3/29/2025 0909 by Yesenia Tyler RN  Outcome: Progressing  3/29/2025 0021 by Madison Sahh RN  Outcome: Progressing  Goal: Incisions, wounds, or drain sites healing without S/S of infection  3/29/2025 0909 by Yesenia Tyler RN  Outcome: Progressing  3/29/2025 0021 by Madison Shah RN  Outcome: Progressing     Problem: Anxiety  Goal: Will report anxiety at manageable levels  Description: INTERVENTIONS:  1. Administer medication as ordered  2. Teach and rehearse alternative coping skills  3. Provide emotional support with 1:1 interaction with staff  3/29/2025 0909 by Yesenia Tyler RN  Outcome: Progressing  3/29/2025 0021 by Madison Shah RN  Outcome: Progressing     
caregiver noted to have fall risk factors     
notify Licensed Independent Practitioner for values outside of normal range   Assess for signs of decreased cardiac output  Goal: Absence of cardiac dysrhythmias or at baseline  Outcome: Progressing  Flowsheets (Taken 4/6/2025 2015)  Absence of cardiac dysrhythmias or at baseline:   Monitor cardiac rate and rhythm   Assess for signs of decreased cardiac output   Administer antiarrhythmia medication and electrolyte replacement as ordered     Problem: Skin/Tissue Integrity - Adult  Goal: Skin integrity remains intact  Outcome: Progressing  Flowsheets  Taken 4/6/2025 2015 by Oliver Bertrand RN  Skin Integrity Remains Intact:   Monitor for areas of redness and/or skin breakdown   Assess vascular access sites hourly   Every 4-6 hours minimum:  Change oxygen saturation probe site  Taken 4/6/2025 1456 by Bry Cervantes RN  Skin Integrity Remains Intact: Monitor for areas of redness and/or skin breakdown  Goal: Incisions, wounds, or drain sites healing without S/S of infection  Outcome: Progressing  Flowsheets (Taken 4/6/2025 2015)  Incisions, Wounds, or Drain Sites Healing Without Sign and Symptoms of Infection: ADMISSION and DAILY: Assess and document risk factors for pressure ulcer development     Problem: Anxiety  Goal: Will report anxiety at manageable levels  Description: INTERVENTIONS:  1. Administer medication as ordered  2. Teach and rehearse alternative coping skills  3. Provide emotional support with 1:1 interaction with staff  Outcome: Progressing     Problem: Nutrition Deficit:  Goal: Optimize nutritional status  Outcome: Progressing     
Madison RN  Outcome: Progressing

## 2025-04-07 NOTE — DISCHARGE SUMMARY
Cardiovascular Surgery Discharge Summary    Pt Name: Dona Kaur  MRN: [id number]  YOB: 1960  Primary Care Physician: Larisa Abbasi APRN - CNP    Admit date:  3/26/2025  6:54 PM      Discharge date:  4/7/2025 12:43 PM    Disposition: home    Admitting Diagnosis:   Loculated left parapneumonic effusion    Discharge Diagnosis:   Loculated left parapneumonic effusion    Condition: Good      Problem List:   Patient Active Problem List   Diagnosis Code    Type 2 diabetes mellitus treated with insulin (HCC) E11.9, Z79.4    Acidosis E87.20    BERNARDO (acute kidney injury) (HCC) from DKA N17.9    Diabetic ketoacidosis without coma associated with type 1 diabetes mellitus E10.10    HTN (hypertension) I10    Nausea & vomiting R11.2         Procedures:    Complete decortication left chest    Reason for Admission:    Dyspnea     Hospital Course:    Following an uncomplicated   left decortication , the patient had an unremarkable and progressive convalescence without adverse events.    Discharge Vitals:  height is 1.803 m (5' 11\") and weight is 76.4 kg (168 lb 6.9 oz). His oral temperature is 98.3 °F (36.8 °C). His blood pressure is 163/89 (abnormal) and his pulse is 105 (abnormal). His respiration is 19 and oxygen saturation is 95%.     At time of discharge, Dona was tolerating a regular diet, having bowel movements,ambulating on his own accord and had adequate analgesia on oral pain medications, and had no signs of symptoms of complications.      DISCHARGE INSTRUCTIONS:  Discharge Medications:         Medication List        START taking these medications      atorvastatin 80 MG tablet  Commonly known as: LIPITOR  Take 1 tablet by mouth nightly     cefdinir 300 MG capsule  Commonly known as: OMNICEF  Take 1 capsule by mouth every 12 hours for 42 doses     lisinopril 40 MG tablet  Commonly known as: PRINIVIL;ZESTRIL  Take 1 tablet by mouth daily     oxyCODONE 5 MG immediate release tablet  Commonly known as:

## 2025-04-07 NOTE — PROGRESS NOTES
[facility]         Progress Note      Dona Kaur  MEDICAL RECORD NUMBER:  [id number]  AGE: 64 y.o.   GENDER: male  : 1960  EPISODE DATE:  3/26/2025      Assessment:     Patient is a 64-year-old male who I am following for left-sided loculated fluid collection.    Imaging appears to have shown dramatic improvement following CT surgery intervention.  Gram-positive cocci identified as Streptococcus intermedius.  This is appropriately covered by ceftriaxone and I will transition to Omnicef with a plan for 3 additional weeks of coverage.  I will schedule for infectious disease follow-up.  Ideally, patient would be on therapy with amoxicillin/clavulanate but unfortunately has a listed penicillin allergy so this is not an option.    Omnicef 300 mg twice daily  Recommend 21 days of therapy  Scheduled for infectious disease follow-up      Subjective:     No chief complaint on file.        Patient is a 64-year-old male who I am consulted for loculated left-sided fluid collection.  Procedural intervention with subsequent improvement in left-sided collection by CT surgery.  Strep intermedius identified on culture from pleural fluid, previously on ceftriaxone but will transition to Omnicef for discharge given penicillin allergy that precludes use of Augmentin.      Patient Active Problem List   Diagnosis Code    Type 2 diabetes mellitus treated with insulin ([facility]) E11.9, Z79.4    Acidosis E87.20    BERNARDO (acute kidney injury) ([facility]) from DKA N17.9    Diabetic ketoacidosis without coma associated with type 1 diabetes mellitus E10.10    HTN (hypertension) I10    Nausea & vomiting R11.2    NSTEMI (non-ST elevated myocardial infarction) ([facility]) I21.4    Loculated pleural effusion J90    SOB (shortness of breath) R06.02    Empyema ([facility]) J86.9    Acute coronary syndrome ([facility]) I24.9    Pneumonia of left lung due to infectious organism J18.9    Penicillin allergy Z88.0    Tobacco smoke exposure Z77.22    
          [facility]         Progress Note      Dona Kaur  MEDICAL RECORD NUMBER:  [id number]  AGE: 64 y.o.   GENDER: male  : 1960  EPISODE DATE:  3/26/2025      Assessment:     Patient is a 64-year-old male who I am consulted for empyema.    CT imaging with evidence of a loculated left-sided pleural effusion which is now status post CT-guided chest tube placement for .  Call [facility], cultures remain in process with gram-positive cocci in clusters on Gram stain.  Continue to only have Gram stain, awaiting speciation of cultures from outside hospital..  Reviewed cell counts from chest tube placement with 600 total nucleated cells that were neutrophil predominant.  Placed orders for more atypical pathogens that may be involved in development of a culture-negative fluid collection.  Awaiting MRSA swab for possible linezolid de-escalation.    There is continued evidence of gram-positive cocci on body fluid culture.  Given the delayed evidence of growth on cultures, this is likely consistent with an anaerobic organism.  This should be covered by metronidazole.  I had ordered additional atypical pathogens which remain in process.    Currently on therapy with metronidazole, linezolid and ceftriaxone  No preliminary growth on culture, previously identified GPC on Gram stain      Subjective:     No chief complaint on file.        Patient is a 64-year-old male who I am following for empyema.  Currently on therapy with linezolid, ceftriaxone and metronidazole.    No new events overnight.  Patient appears to be doing better this morning on evaluation.      Patient Active Problem List   Diagnosis Code    Type 2 diabetes mellitus treated with insulin ([facility]) E11.9, Z79.4    Acidosis E87.20    BERNARDO (acute kidney injury) ([facility]) from DKA N17.9    Diabetic ketoacidosis without coma associated with type 1 diabetes mellitus E10.10    HTN (hypertension) I10    Nausea & vomiting R11.2    NSTEMI (non-ST 
      Pharmacy Renal Adjustment    Pharmacy renally adjusted the following medication(s) per P&T approved policy: levaquin    Recent Labs     03/27/25  0734 03/27/25  0944   BUN 28* 30*   CREATININE 1.5* 1.7*     eGFR:   Recent Labs     03/26/25 2045 03/27/25 0734 03/27/25  0944   LABGLOM 76 52* 45*     Estimated Creatinine Clearance: 44 mL/min (A) (based on SCr of 1.7 mg/dL (H)).    Assessment:  BERNARDO    Plan:   Decrease Levaquin from 750mg to 500mg daily as patient is right on bubble for change to levaquin 250mg daily.  Will continue to monitor if further reduction is needed    Please call pharmacy with any questions.    Aster SALAS.Ph., BCPS., 3/27/2025,2:20 PM      
    Cardiothoracic Surgery History & Physical        Patient:  Dona Kaur  YOB: 1960    MRN: [id number]     Acct: [id number]    Date of Admission: 3/26/2025    Interim History:  Comfortable on RA; nominal chest tube output    Past Medical History:          Diagnosis Date    Diabetes mellitus (HCC)     Hypertension        Past Surgical History:          Procedure Laterality Date    CT GUIDED PLEURAL DRAINAGE W CATH PERC  3/27/2025    CT GUIDED PLEURAL DRAINAGE W CATH PERC 3/27/2025 [facility] CT SCAN       Medications Prior to Admission:      Prior to Admission medications    Medication Sig Start Date End Date Taking? Authorizing Provider   losartan-hydroCHLOROthiazide (HYZAAR) 100-12.5 MG per tablet Take 1 tablet by mouth daily   Yes Lay Lauren MD   insulin lispro, 1 Unit Dial, (HUMALOG KWIKPEN) 100 UNIT/ML SOPN Inject 4 Units into the skin 3 times daily (before meals) Glucose: Dose:  No Insulin; 200-249 4 Units; 250-299 8 Units; 300-349 12 Units; Over 349 16 Units and notify physician 9/3/23  Yes Arias Hurley MD   insulin glargine (LANTUS SOLOSTAR) 100 UNIT/ML injection pen Inject 15 Units into the skin nightly 9/3/23  Yes Arias Hurley MD   ondansetron (ZOFRAN-ODT) 4 MG disintegrating tablet Take 1 tablet by mouth every 8 hours as needed for Nausea or Vomiting 9/3/23  Yes Arias Hurley MD   aspirin 81 MG tablet Take 1 tablet by mouth daily   Yes ProviderLay MD   ibuprofen (ADVIL;MOTRIN) 600 MG tablet Take 1 tablet by mouth every 8 hours as needed for Pain 3/20/25 3/27/25  Ricardo Doss MD   Insulin Pen Needle 32G X 4 MM MISC 1 each by Does not apply route daily 9/3/23   Arias Hurley MD   traZODone (DESYREL) 100 MG tablet Take 0.5 tablets by mouth nightly as needed for Sleep 2/20/20   Ino Curry MD       Allergies:  Pcn [penicillins]    Social History:      TOBACCO:   reports that he has been smoking cigarettes. He has never used smokeless tobacco.  ETOH:   
    Hospitalist Progress Note      Patient:  Dona Kaur 64 y.o. male       : 1960  Unit/Bed:4K-012-A    Date of Admission: 3/26/2025      ASSESSMENT AND PLAN    Active Problems  Loculated pleural effusion, left- s/p thoracotomy w/ decortication  S/p CT-guided chest tube placement 3/27 and tPA DNase was completed x 4 doses at [place]  Pleural fluid culture completed at different hospitals growing GPC in clusters, awaiting final results  [facility] cultures growing strep intermedius  Pulmonology following  S/p bronchoscopy -no evidence of malignancy or mucous plugging. BAL culture negative for bacteria and HSV. LLL biopsy negative for malignancy  Continue monitoring chest tube output  CTS following  Patient underwent left thoracotomy with decortication 25  Chest tube present  ID following  GPC on body fluid stain.  No preliminary growth on cultures  Continue Rocephin, linezolid, Flagyl  Acute hypoxic respiratory failure/Postoperative respiratory failure  Baseline room air, presented on 2 L/min NC, weaned to room air but on 2L NC following surgery   Secondary to above  Encourage pulmonary hygiene and wean as tolerated  BERNARDO, improved: Patient with increase in creatinine from 1.2 to 1.5 overnight.  Improved to 1.1.  Baseline appears around 1-1.2.    S/p  100 mL/h x 10 hours fluid bolus  Will continue holding losartan/HCTZ  Repeat BMP in the a.m.  Diabetic left first toe wound: Patient noted prior to hospitalization.  No surrounding erythema or evidence of infection.  Continue to monitor to ensure no progression      Resolved Problems  Chest pain: Initial on admission, of first concern for ACS, however after more awake investigation likely due to loculated pleural effusion.  Heparin discontinued  Stat EKG for any chest pain, continue to monitor    Chronic Conditions (reviewed, stable, and home medications resumed, unless otherwise stated)  Uncontrolled IDDMII  Patient has been using home insulin pump, has 
    Hospitalist Progress Note      Patient:  Dona Kaur 64 y.o. male       : 1960  Unit/Bed:[facility]032-A    Date of Admission: 3/26/2025      ASSESSMENT AND PLAN    Active Problems  Loculated pleural effusion, left  S/p CT-guided chest tube placement 3/27 and tPA DNase was completed x 4 doses at [place]  Pleural fluid culture completed at different hospitals growing GPC in clusters, awaiting final results  [facility] cultures growing strep intermedius  Pulmonology following  S/p bronchoscopy -no evidence of malignancy or mucous plugging. BAL culture negative for bacteria and HSV. LLL biopsy negative for malignancy  Continue monitoring chest tube output  CTS following  Plans for left thoracotomy with decortication , will go to ICU following procedure  ID following  GPC on body fluid stain.  No preliminary growth on cultures  Continue Rocephin, linezolid, Flagyl  Acute hypoxic respiratory failure, improved  Baseline room air, presented on 2 L/min NC, now on room air  Secondary to above  Encourage pulmonary hygiene and wean as tolerated  BERNARDO: Patient with increase in creatinine from 1.2-1.5 overnight.  Baseline appears around 1-1.2.  Hold nephrotoxic agents.  Will give 100 mL/h x 10 hours fluid bolus  Given acute worsening overnight, will obtain urinalysis  Repeat BMP in the a.m.  Diabetic left first toe wound: Patient noted prior to hospitalization.  No surrounding erythema or evidence of infection.  Continue to monitor to ensure no progression      Resolved Problems  Chest pain: Initial on admission, of first concern for ACS, however after more awake investigation likely due to loculated pleural effusion.  Heparin discontinued  Stat EKG for any chest pain, continue to monitor    Chronic Conditions (reviewed, stable, and home medications resumed, unless otherwise stated)  Uncontrolled IDDMII  Patient has been using home insulin pump, has been managing it himself.  Aware that he will be n.p.o. at midnight and 
    Hospitalist Progress Note      Patient:  Dona Kaur 64 y.o. male     Unit/Bed:8B-32/032-A    Date of Admission: 3/26/2025      ASSESSMENT AND PLAN    Active Problems  Loculated Pleural Effusion, left   Case discussed with ID  PT had thoracentesis at [place] prior to transfer. Pt underwent a second thoracentesis, unable to drain much fluid given the viscosity.   Pigtail catheter placed on 3/27 & tpaDNAse completed again this AM (2nd dose)  Pleural Fluid culture completed at [facility] is growing GPC in clusters.   Continue Zyvox, Flagyl, Ceftriaxone.    Acute respiratory failure, hypoxia   Pt is on 1L NC. Pt's baseline is RA.   Wean as able.   CXR personally reviewed by this provider - left lung aeration continues to be similar from previous. Pigtail catheter in place.   BERNARDO   Creatinine baseline ~1. Creatinine improving 1.2 today. BMP in the AM.   Chest Pain   At first concerned for ACS, after more investigation and loculated pleural effusion. Likely chest pain is from pleural effusion. Stop heparin. Monitor for worsening for chest pain.     Chronic Conditions (reviewed and stable unless otherwise stated)  Uncontrolled IDDMII   HTN       LDA: []CVC / []PICC / []Midline / []Willard / []Drains / []Mediport / [x]None  Antibiotics: Levaquin   Steroids: None   Labs (still needed?): [x]Yes / []No  IVF (still needed?): []Yes / [x]No    Level of care: []Step Down / [x]Med-Surg  Bed Status: [x]Inpatient / []Observation  Telemetry: [x]Yes / []No  PT/OT: []Yes / [x]No    DVT Prophylaxis: [x] Lovenox / [] Heparin / [] SCDs / [] Already on Systemic Anticoagulation / [] None   Code status: Full Code     Expected discharge date:  Unknown   Disposition: Home     ===================================================================    Chief Complaint: Chest Pain   Subjective (past 24 hours): No acute events overnight. Pt had tPA-DNAse instilled this AM. Pt is frustrated that he is not feeling better quick enough.       HPI / 
    Hospitalist Progress Note      Patient:  Dona Kaur 64 y.o. male     Unit/Bed:8B-32/032-A    Date of Admission: 3/26/2025      ASSESSMENT AND PLAN    Active Problems  Loculated Pleural Effusion, left   ID Pulm & CTS following. Case discussed with ID today.    Pulm did bronch this AM, no signs of malignancy or mucus plugging. CTS plans surgery on Friday for decortication. ID continues to follow for ABX coverage.   Pt had thoracentesis at [place] prior to transfer. Pt underwent a second thoracentesis, unable to drain much fluid given the viscosity.   Pigtail catheter placed on 3/27 & tpaDNAse has been completed - 4 doses  Pleural Fluid culture completed at [facility] is growing GPC in clusters (Awaiting final results from [place]). [facility] cultures growing Strep intermedius.   Continue Zyvox, Flagyl, Ceftriaxone.   Anaerobic culture is growing GPC   CBC in the AM.   WBC 11.2   Acute respiratory failure, hypoxia   Pt is on 1L NC. Pt's baseline is RA.   Wean as able.   CXR personally reviewed by this provider - Similar appearance as 4/1 CXR. Loculated pleural effusion still present.   BERNARDO   Creatinine baseline ~1. Creatinine improving 1.2. BMP in the AM.   Chest Pain   At first concerned for ACS, after more investigation and loculated pleural effusion. Likely chest pain is from pleural effusion. Stop heparin. Monitor for worsening for chest pain.     Chronic Conditions (reviewed and stable unless otherwise stated)  Uncontrolled IDDMII   HTN       LDA: []CVC / []PICC / []Midline / []Willard / []Drains / []Mediport / [x]None  Antibiotics: Levaquin   Steroids: None   Labs (still needed?): [x]Yes / []No  IVF (still needed?): []Yes / [x]No    Level of care: []Step Down / [x]Med-Surg  Bed Status: [x]Inpatient / []Observation  Telemetry: [x]Yes / []No  PT/OT: [x]Yes / []No    DVT Prophylaxis: [x] Lovenox / [] Heparin / [] SCDs / [] Already on Systemic Anticoagulation / [] None   Code status: Full Code     Expected 
  CT/CV Surgery Procedure Note    3/28/2025 11:16 AM    Intrapleural tPA/Dornase administration on left side:     Date of Operation: 3/28/2025 11:16 AM  Surgeon/  :Dr. Rose/Venkat Stokes PA-C  Anesthesia/Local : None  Operation:  Intrapleural tPA/Dornase administration at bed side   Indication for the procedure: Alteplase ( tPA) administration intrapleurally daily for left side loculated/ pleural effusion/ empyema/ fibrothorax via pigtail catheter  Consent: Verbal consent obtained from patient after explaining the benefits,risks and associated complications including but not limited to development of pneumothorax and hemothorax with requirement of chest tube placement.  Estimated Blood Loss: None   Complications: None.     Description of Procedure:   The patient was kept in sitting position comfortable with arms resting on a support table. The patient was identified and the procedure verified as tPA/Dornase administration intrapleurally on left side side of chest. A time out was held and the above information confirmed by registered nurse taking care of patient.     By using standered aseptic and sterile precautions, the left side side pigtail catheter was uncapped and a connection kit was used to access the catheter.  6mg of Alteplase ( tPA) mixed with 30ml of 0.9 sterile NS and Dornase 5 mg mixed with 30 ml of sterile water were easily instilled into the pleural space.  The pigtail catheter stop clock was turned to the off position (clamped).  Nurse instructed to rotate the patient 90 degrees every 15 minutes for duration of clamp time, which is 4 hours.  Nurse instructed to open stop clock (unclamp) after 4 hour duration or earlier if symptoms of SOB, chest discomfort, or cough persist since administration of medications.     Patient tolerated procedure well     The plan of care was discussed in detail with Dr. Rose     Electronically signed by Venkat Stokes PA-C on 3/28/2025 at 11:16 AM      
  CT/CV Surgery Procedure Note    3/31/2025 10:58 AM    Intrapleural tPA/Dornase administration on left side:     Date of Operation: 3/31/2025 10:58 AM  Surgeon/  :Dr. Rose/Mc Stokes PA-C  Anesthesia/Local : None  Operation:  Intrapleural tPA/Dornase administration at bed side   Indication for the procedure: Alteplase ( tPA) administration intrapleurally daily for left side loculated/ pleural effusion/ empyema/ fibrothorax via pigtail catheter  Consent: Verbal consent obtained from patient after explaining the benefits,risks and associated complications including but not limited to development of pneumothorax and hemothorax with requirement of chest tube placement.  Estimated Blood Loss: None   Complications: None.     Description of Procedure:   The patient was kept in sitting position comfortable with arms resting on a support table. The patient was identified and the procedure verified as tPA/Dornase administration intrapleurally on left side side of chest. A time out was held and the above information confirmed by registered nurse taking care of patient.     By using standered aseptic and sterile precautions, the left side side pigtail catheter was uncapped and a connection kit was used to access the catheter.  6mg of Alteplase ( tPA) mixed with 30ml of 0.9 sterile NS and Dornase 5 mg mixed with 30 ml of sterile water were easily instilled into the pleural space.  The pigtail catheter stop clock was turned to the off position (clamped).  Nurse instructed to rotate the patient 90 degrees every 15 minutes for duration of clamp time, which is 4 hours.  Nurse instructed to open stop clock (unclamp) after 4 hour duration or earlier if symptoms of SOB, chest discomfort, or cough persist since administration of medications.     Patient tolerated procedure well     The plan of care was discussed in detail with Dr. Rose     Electronically signed by Mc Stokes PA-C on 3/31/2025 at 10:58 AM      
  CT/CV Surgery Progress Note    2025 12:40 PM  Surgeon:  Dr. Rose    Subjective:  Mr. Kaur is resting comfortably in bed on 1.5L O2 NC, alert, and in no acute distress. He had a CT chest this morning.  3 loculated areas identified with collapse of left lower lobe.  Chest tube 210 cc past 24 hours with no air leak.    Intake/Output Summary (Last 24 hours) at 2025 1240  Last data filed at 2025 0639  Gross per 24 hour   Intake 360 ml   Output 510 ml   Net -150 ml     Vital Signs: /83   Pulse 94   Temp 97.8 °F (36.6 °C) (Oral)   Resp 16   Ht 1.803 m (5' 11\")   Wt 72.6 kg (160 lb 1.6 oz)   SpO2 98%   BMI 22.33 kg/m²    Temp (24hrs), Av.3 °F (36.8 °C), Min:97.8 °F (36.6 °C), Max:98.6 °F (37 °C)    Labs:   CBC:  Recent Labs     25  0612   WBC 15.3*   HGB 8.3*   HCT 26.1*   MCV 89.7   *     BMP:   Recent Labs     25  0612 25  0838 25  0858 25  1734 25  0812   *  --   --   --   --    K 5.6*  --  4.6  --   --    CL 99  --   --   --   --    CO2 26  --   --   --   --    BUN 28*  --   --   --   --    CREATININE 1.2  --   --   --   --    POCGLU  --    < >  --    < > 138*    < > = values in this interval not displayed.     Last HgA1C:   Lab Results   Component Value Date    LABA1C 8.7 (H) 2025     Imaging:  CT Chest: 2025  1. Left-sided chest tube in place.  2. Loculated fluid collections in the left hemithorax as described above.  3. Abnormal density in the left lower lobe consistent with atelectasis or  infiltrate. Small abnormal density in the right upper lobe posteriorly.  4. Small mediastinal lymph nodes.    Scheduled Meds:    linezolid  600 mg Oral 2 times per day    metroNIDAZOLE  500 mg Oral 2 times per day    docusate sodium  100 mg Oral BID    polyethylene glycol  17 g Oral Daily    cefTRIAXone (ROCEPHIN) IV  2,000 mg IntraVENous Q24H    acetaminophen  1,000 mg Oral 4 times per day    [Held by provider] losartan  100 mg Oral Daily 
  CT/CV Surgery Progress Note    2025 7:39 AM  Surgeon:  Dr. Rose    Procedure: POD #3   Left thoracotomy  Complete decortication left lung    Subjective:  Mr. Kaur is resting comfortably in chair on RA, alert, and in no acute distress. Pt denies chest pressure, SOB, fever,chills, N/V/D. 10cc out of chest tube in past 24h. Chest tubes have been removed.    Intake/Output Summary (Last 24 hours) at 2025 0739  Last data filed at 2025 0644  Gross per 24 hour   Intake 1090 ml   Output 2060 ml   Net -970 ml     Vital Signs: BP (!) 161/92   Pulse 93   Temp 98.2 °F (36.8 °C) (Oral)   Resp 18   Ht 1.803 m (5' 11\")   Wt 76.4 kg (168 lb 6.9 oz)   SpO2 97%   BMI 23.49 kg/m²    Temp (24hrs), Av.3 °F (36.8 °C), Min:98.1 °F (36.7 °C), Max:98.6 °F (37 °C)    Labs:   CBC:  Recent Labs     258 25  0347 25  0404   WBC 15.9* 16.4* 13.1*   HGB 8.0* 7.7* 7.5*   HCT 24.4* 23.9* 23.5*   MCV 86.2 86.6 86.7   * 571* 572*     BMP:   Recent Labs     25  0448 25  0817 25  0347 25  0641 25  0318 25  0404   *  --  138  --   --  134*   K 4.5  --  3.7  --   --  4.1     --  103  --   --  100   CO2 21*  --  26  --   --  27   BUN 22  --  20  --   --  17   CREATININE 0.8  --  0.8  --   --  0.6*   POCGLU  --    < >  --    < > 121*  --     < > = values in this interval not displayed.     Last HgA1C:   Lab Results   Component Value Date    LABA1C 8.7 (H) 2025     Imaging:  CXR: 2025  FINDINGS:  No significant pneumothorax is seen. There is a small left pleural   effusion and left lung base atelectasis. There is subcutaneous emphysema   of the lateral left chest. The heart is normal in size. The left chest   tubes are unchanged.    IMPRESSION:     No significant pneumothorax seen.     New small left pleural effusion.          Scheduled Meds:    carvedilol  6.25 mg Oral BID WC    potassium chloride  20 mEq Oral BID WC    insulin glargine  10 Units 
  CT/CV Surgery Progress Note    3/31/2025 8:04 AM  Surgeon:  Dr. Rose    Subjective:  Mr. Kaur is resting comfortably in bed on RA, alert, and in no acute distress. Mr. Kaur is doing well with no new complaints.  3rd dose of TPA/dornase given yesterday with chest tube output 350 cc past 24 hrs.      Intake/Output Summary (Last 24 hours) at 3/31/2025 0804  Last data filed at 3/31/2025 0620  Gross per 24 hour   Intake 600 ml   Output 850 ml   Net -250 ml     Vital Signs: BP (!) 136/91   Pulse 97   Temp 98.8 °F (37.1 °C) (Oral)   Resp 18   Ht 1.803 m (5' 11\")   Wt 73.1 kg (161 lb 1.6 oz)   SpO2 95%   BMI 22.47 kg/m²    Temp (24hrs), Av.2 °F (36.8 °C), Min:97.6 °F (36.4 °C), Max:98.8 °F (37.1 °C)    Labs:   CBC:  Recent Labs     25  1220 25  0617 25  0612   WBC 17.5* 15.0* 15.3*   HGB 8.8* 8.9* 8.3*   HCT 27.3* 27.4* 26.1*   MCV 87.8 88.7 89.7   * 446* 464*     BMP:   Recent Labs     25  1220 25  0617 25  0818 25  0612 25  0838 25  0858 25  1734   *  --  129*  --  131*  --   --   --    K 4.6  --  4.4  --  5.6*  --  4.6  --    CL 98  --  98  --  99  --   --   --    CO2 24  --  24  --  26  --   --   --    BUN 28*  --  28*  --  28*  --   --   --    CREATININE 1.2  --  1.2  --  1.2  --   --   --    POCGLU  --    < >  --    < >  --    < >  --  203*    < > = values in this interval not displayed.     Last HgA1C:   Lab Results   Component Value Date    LABA1C 8.7 (H) 2025     Imaging:  CXR: 3/31/2025  Findings:  Left basilar chest tube is in stable position. The small loculated left   pleural effusion is decreased possibly resolved. There is stable airspace   opacity in the left lower lung suggestive of pneumonia. Follow-up to   ensure resolution is recommended. Cardiomediastinal silhouette is stable.     IMPRESSION:  Findings as above.     This document has been electronically signed by: Sebas Fountain MD on 
  Cardiovascular Surgery Progress Note    Comfortable on RA  Stable  CXR small basilar pleural reaction, lung up  Labs noted  Nominal tube output  -Continue IV diuresis  -Lantus d/c'd  -Keep tubes in  
  Cardiovascular Surgery Progress Note    Comfortable on RA  Stable, somewhat hypertensive  Blood glucoses 250+, despite addition yesterday of lantus 25 bid  CXR full inflation LLL, residual pleural reaction  Nominal chest tube output  -Lisinopril for hypertension  -Lantus to 50 bid  -IV diuresis  -Keep chest tubes  
  Physician Progress Note      PATIENT:               CALVIN BOND  CSN #:                  [id number]  :                       1960  ADMIT DATE:       3/26/2025 6:54 PM  DISCH DATE:  RESPONDING  PROVIDER #:        Skylar Morrow PA-C          QUERY TEXT:    Please clarify the patient?s nutritional status:    The clinical indicators include:  The medical record reflects the following:  Risk Factors: HTN, CAD, Diabetes, M64yrs  / Dietitian progress notes Malnutrition Assessment: Malnutrition Status:    Moderate malnutrition  Context:  Acute Illness  Findings of the 6 clinical characteristics of malnutrition:  Energy Intake:  75% or less of estimated energy requirements for 7 or more   days (average over LOS x8 days)  Weight Loss:  Unable to assess (pt declines; difficult to assess with   thoracentesis x2 and chest tube output volume)  Body Fat Loss:  Mild body fat loss Orbital, Buccal region  Muscle Mass Loss:  Mild muscle mass loss Temples (temporalis), Clavicles   (pectoralis & deltoids)  Fluid Accumulation:  Unable to assess   Strength:  Not Performed  Anthropometric Measures: Height: 180.3 cm (5' 11\") Current Body Weight: 73.1   kg (161 lb 2.5 oz) (4/3: no edema) Current BMI (kg/m2): 22.5  Nutrition Diagnosis:  Moderate malnutrition, in context of acute illness or   injury related to inadequate protein-energy intake as evidenced by criteria as   identified in malnutrition assessment    Treatment: Food and/or Nutrient Delivery: Continue Current Diet, Serial   monitoring for weight, RD consulted.    Thank You  Kenneth DAIGLE CDS  Options provided:  -- Protein calorie malnutrition moderate  -- Other - I will add my own diagnosis  -- Disagree - Not applicable / Not valid  -- Disagree - Clinically unable to determine / Unknown  -- Refer to Clinical Documentation Reviewer    PROVIDER RESPONSE TEXT:    This patient has moderate protein calorie malnutrition.    Query created by: Kenneth Valverde 
  [facility]      Patient - Dona Kaur   MRN -  [id number]   [facility] # - [id number]   - 1960      Date of Admission -  3/26/2025  6:54 PM  Date of evaluation -  4/3/2025  Room - 8B-32/032-A   Hospital Day - 8  Consulting - Skylar Mcgovern PA-C Primary Care Physician - Larisa Abbasi APRN - AMIRAH     Problem List      Active Hospital Problems    Diagnosis Date Noted    Tobacco smoke exposure [Z77.22] 2025    Empyema (HCC) [J86.9] 2025    Acute coronary syndrome (HCC) [I24.9] 2025    Pneumonia of left lung due to infectious organism [J18.9] 2025    Penicillin allergy [Z88.0] 2025    Loculated pleural effusion [J90] 2025    SOB (shortness of breath) [R06.02] 2025    NSTEMI (non-ST elevated myocardial infarction) (HCC) [I21.4] 2025     Reason for Consult    Left Pleural effusion, need bronch    HPI   History Obtained From: Patient  and electronic medical record.    Dona Kaur is a 64 y.o. male with pmhx of  HTN, IDDM2 who [facility]-sided chest [facility].  Patient stated that on 3/27 he developed left-sided chest pain was given pain medicine and sent home after presenting ED.  However on Del 3/30 he he noted his left-sided chest pain, worse and could not tolerate the pain and decided to be checked at ED.  CTA on 3/26 noted mod loculated left pleural effusion with adjacent atelectasis and fluid in left fissure. Had thoracentesis done on 3/27 at [facility] prior transfer. Had another thoracentesis on admission which removed little due to viscosity. Repeat CXR noted persistent left sided effusion and chest tube placed on 3/27 by CTS. Serial daily CXR noted persistent left sided pleural effusion. Pigtail catheter placed on 3/27 & tpaDNAse and pt completed 4 doses.   The Thoracentesis BF from [place] grew GPC in clutters and zyvox, flagyl and CTX were initiated with ID consult. 
0809- pt to pacu. Pt alert, denies pain, nausea SOB. VSS, pt appears in no acute distress.    0816- pt resting in bed eyes open. VSS. Pt continues to deny complaint.    0825- Dr Baig at bedside to discuss results with patient.     0833- report called to 8AB nurse Zaragoza. Pt continues to deny complaint.     0839- Pt meets criteria for discharge from pacu. Transport requested.     0840- Dr Rose at bedside to discuss surgical plans for Friday for left lung decortication. Dr Rose tells pt he will come see him in his room later. Plan is Surgery Friday @ 0700.     0851- Transport arrives to return pt to 8B 32 in stable condition.           
1123 Patient arrives to PACU, not yet alert to voice. Resp easy and unlabored on 10 L simple mask. L chest tube attached to suction.     1130 Omari HENDERSON at bedside, orders for chest x ray and CBC.     1132 Pt attempting to sit up in bed, moaning and yelling in pain.     1135 50 mcg of Fentanyl given for pain.    1136 Portable chest x ray complete.    1140 50 mcg of Fentanyl given for pain.     1143 CBC sent to lab.    1150 , 6 units of insulin given.     1152 Pt continues to complain of pain, rates it an 8/10. 0.5 mg of Dilaudid given.     1200 Patient continues to rate pain an 8/10, 0.5 mg of Dilaudid given.     1215 Patient resting in bed with eyes closed, appears comfortable. Resp easy and unlabored on 2L NC. Attempted to call report, nurse said she would return call     1220 Pt meets criteria for discharge from PACU    1229 Arterial line removed without complications, pressure dressing applied    1235  Patient resting in bed with eyes closed, appears comfortable. Resp easy and unlabored on 2L NC.     1245 Navdeep LEBRON stated she would return call. Pt rests in bed with eyes closed, resp easy and unlabored, pt dozing on and off. VSS    1259 Report given to Navdeep LEBRON. Awaiting transport     1320 Pt states pain is a 8/10, 50 mcg of Fentanyl given    1338 Kaley called and updated pt is headed to 4K     1340 Patient meets criteria for discharge from PACU for second time. Pt to 4K in stable condition     
1305 Patient received in CT scanning for Left chest tube insertion. Monitor applied.   1314 Dr. Mcmahan here; spoke to patient. This procedure has been fully reviewed with the patient and written informed consent has been obtained.   1317 Patient assisted to CT table and pre scan started.   1324 Procedure started with Dr. Mcmahan  1328 Samples collected and sent to lab for further review.  300 ml of yellow fluid removed.  Chest tube attached to true close drain.  1332 Procedure completed; patient tolerated well. Post scan obtained.   1337 Patient on bed; comfort ensured.  1338 patient taken to  via bed/transport with family at bedside. Pt alert and oriented x3; follows commands. Skin pink, warm, and dry. Respirations easy, regular, and nonlabored.    
Approx. 2000 pt informed me that his sugar was 212 per his dexcom and was not giving himself insulin. He refused our fingerstick and insulins ordered. Pt is in stable condition resting in bed at this time.   
Approx. 2000 pt informed me that his sugar was 300 per his dexcom and was giving himself 2u of insulin. He refused our fingerstick and insulins ordered. Pt is in stable condition resting in bed at this time.   
Comprehensive Nutrition Assessment    Type and Reason for Visit:  Initial, LOS    Nutrition Recommendations/Plan:   Continue diet per provider and encourage adequate PO intake at best efforts.   Pt declines offer for ONS at this time  Recommend MVI     Malnutrition Assessment:  Malnutrition Status:  Moderate malnutrition (04/03/25 1151)    Context:  Acute Illness     Findings of the 6 clinical characteristics of malnutrition:  Energy Intake:  75% or less of estimated energy requirements for 7 or more days (average over LOS x8 days)  Weight Loss:  Unable to assess (pt declines; difficult to assess with thoracentesis x2 and chest tube output volume)     Body Fat Loss:  Mild body fat loss Orbital, Buccal region   Muscle Mass Loss:  Mild muscle mass loss Temples (temporalis), Clavicles (pectoralis & deltoids)  Fluid Accumulation:  Unable to assess     Strength:  Not Performed    Nutrition Assessment:     Pt. moderately malnourished AEB criteria as listed above.  At risk for further nutrition compromise r/t loculated pleural effusion - thoracentesis x2 and chest tube placement 3/27; tpaDNAse x4, s/p bronch 4/2, plan for thoracotomy 4/4, BERNARDO, chest pain - admit with NSTEMI, recnet PNA ~6 weeks ago, extended LOS and underlying medical condition (PMHx: uncontrolled DM, HTN).        Nutrition Related Findings:    Pt. Report/Treatments/Miscellaneous: Pt seen, reports good intake and appetite (PO documentation averaging 75% or less for daily intakes). Pt denies changes in PO intake. Pt denies weight changes and feels like he has been stable. Pt denies N/V - states that it has subsided and thinks he was experiencing it d/t \"too many pills.\" Pt recently with PNA - hasn't been feeling well. Pt has been refusing insulin administration and BG checks by RN staff - states he has been checking dexcom and giving himself insulin and prefers to keep it that way as it \"took too many years to figure out and I don't want to mess it up 
Dona Kaur is a 64 y.o. male from [place] ED with Dr Carina Bentley requesting transfer to [facility]. Dona has a PMHx of DM, HTN. He arrived to their ED with c/o CP x 8 days, intermittent episodes. The pain increased and became constant overnight with SOB. He was at their ED 1 week ago for the same complaint and diagnosed with musculoskeletal pain and discharged home. Today troponin 28, with repeat 34. EKG no acute findings reported. CT chest abdomen and pelvis done which showed moderate loculated left pleural effusion with adjacent atelectasis and fluid in the left fissure. Follow-up to resolution to exclude underlying nodule or mass without separate isolated nodule clearly evident on the current exam however largely atelectatic left lung adjacent along with chronic findings. Their radiology performed an US guided left thoracentesis with 400 ml cloudy rachel fluid obtained. Patient was started on a Heparin gtt due to concerns for NSTEMI. /85, HR 98, RR 23, SpO2 95% RA, afebrile. Accepted in transfer under Dr Trimble/Dr Denise.   
Echocardiogram complete at bedside.  
Order received for chest tube placement. Case reviewed with Dr Mcmahan and Dr Benedict including labs, medications, and imaging. Plan for procedure at 1300 today. Call placed to primary RN [facility] to notify of plan of care, to ensure patient was NPO, and to ensure patient was consent-able and aware of procedure. Primary RN stated understanding.   
Patient in endo for bronchoscopy. Scope  used. Pictures taken. Washings and BAL completed. 2 Brushings completed. Specimens labeled and sent to lab.  biopsies taken.  jars labeled and sent to lab. Fluro used. Procedure completed. Patient tolerated well.   
Patient out of room. Will check back as able to complete echo.  
Spiritual Health History and Assessment/Progress Note  [facility]    (P) Spiritual/Emotional Needs,  ,  ,      Name: Dona Kaur MRN: [id number]    Age: 64 y.o.     Sex: male   Language: English   Hinduism: Non-Spiritism   NSTEMI (non-ST elevated myocardial infarction) (HCC)     Date: 3/31/2025            Total Time Calculated: (P) 7 min              Spiritual Assessment began in [facility] SUR[facility] 8AB        Referral/Consult From: (P) Rounding   Encounter Overview/Reason: (P) Spiritual/Emotional Needs  Service Provided For: (P) Patient    Sia, Belief, Meaning:   Patient unable to assess at this time  Family/Friends No family/friends present      Importance and Influence:  Patient unable to assess at this time  Family/Friends No family/friends present    Community:  Patient feels well-supported. Support system includes: Friends  Family/Friends No family/friends present    Assessment and Plan of Care:     Patient Interventions include: Facilitated expression of thoughts and feelings  Family/Friends Interventions include: No family/friends present    Patient Plan of Care: Spiritual Care available upon further referral  Family/Friends Plan of Care: No family/friends present    Electronically signed by Chaplain Elizabeth on 3/31/2025 at 2:45 PM    
TRANSFER - OUT REPORT:    Verbal report given to Pauline RN(name) on Dona Kaur being transferred to (unit) for routine progression of patient care       Report consisted of patient's Situation, Background, Assessment and   Recommendations(SBAR).     Information from the following report(s) Nurse Handoff Report, Surgery Report, and MAR was reviewed with the receiving nurse.    Opportunity for questions and clarification was provided.      Patient transported with:   Monitor  O2 @ 2 liters    
[facility]  OCCUPATIONAL THERAPY MISSED TREATMENT NOTE  [facility] ICU STEPDOWN TELEMETRY 4K  4K-012-A      Date: 2025  Patient Name: Dona Kaur        CSN: [id number]   : 1960  (64 y.o.)  Gender: male   Referring Practitioner: Katharine Leiva PA            REASON FOR MISSED TREATMENT: Nursing approved session, pt resting in bed upon arrival and politely declines as he reports not feeling too well and just getting comfortable.  OT staff to check back as able/appropriate.     
[facility]  INPATIENT OCCUPATIONAL THERAPY  [facility] MED SURG 8AB  EVALUATION      Discharge Recommendations: Continue to assess pending progress (anticipate home pending medical course)  Equipment Recommendations: No        Time In: 0825  Time Out: 09  Timed Code Treatment Minutes: 25 Minutes  Minutes: 37          Date: 4/3/2025  Patient Name: Dona Kaur,   Gender: male      MRN: [id number]  : 1960  (64 y.o.)  Referring Practitioner: Katharine Leiva PA  Diagnosis: NSTEMI (non-ST elevated myocardial infarction) (HCC)  Additional Pertinent Hx: Per EMR: 64 y.o. male with pmhx of  HTN, IDDM2 who presented to [facility] for evaluation of left-sided chest pain.  Patient stated that on 3/27 he developed left-sided chest pain was given pain medicine and sent home after presenting ED.  However on Del 3/30 he he noted his left-sided chest pain, worse and could not tolerate the pain and decided to be checked at ED.  CTA on 3/26 noted mod loculated left pleural effusion with adjacent atelectasis and fluid in left fissure. Had thoracentesis done on 3/27 at [facility] prior transfer. Had another thoracentesis on admission which removed little due to viscosity. Repeat CXR noted persistent left sided effusion and chest tube placed on 3/27 by CTS. Serial daily CXR noted persistent left sided pleural effusion. Pigtail catheter placed on 3/27 & tpaDNAse and pt completed 4 doses.   The Thoracentesis BF from [place] grew GPC in clutters and zyvox, flagyl and CTX were initiated with ID consult. Pulmonology was consulted on  for bronchoscopy. Plan for OR  for thoracotomy with wash out.    Restrictions/Precautions:  Restrictions/Precautions: General Precautions, Fall Risk  Position Activity Restriction  Other Position/Activity Restrictions: L chest tube    Subjective  Chart Reviewed: Yes, Orders, Progress Notes, History and Physical  Patient assessed for rehabilitation services?: Yes  Family / Caregiver 
10:34 AM      Electronically signed by:  Katharine HENDERSON 4/2/2025 2:32 PM          
events overnight. Pt had tPA-DNAse instilled this AM. Pt states that he is having some pain with where the chest tube is placed. Pt breathing better today.       HPI / Hospital Course:  Initial H&P \"Dona Kaur is a 64 y.o. male with PMHx of IDDM1, HTN who presented to [facility] with chief complaint of of left-sided chest pain abdominal pain has been ongoing for the past several days.  He was seen about a week ago in the ER for similar and was sent home with diagnosis of musculoskeletal pain.  Patient does report he has been having pain for several weeks but it is intensifying and has been constant overnight.  He is uncomfortable at home and only able to sleep when he lies on his abdomen. Patient does report having a cough about 6 weeks ago.  States he was sick for a few weeks at home.  He had not sought medical treatment for his symptoms.  He did report however he had quit smoking at this time.    ED course:  Bedside thoracentesis was attempted.  They drained 450 ml of fluid before the patient became hypotensive.  The procedure was then aborted.\"     Medications:    Infusion Medications    sodium chloride Stopped (03/28/25 0028)    sodium chloride      dextrose      Scheduled Medications    cefTRIAXone (ROCEPHIN) IV  2,000 mg IntraVENous Q24H    metroNIDAZOLE  500 mg IntraVENous Q12H    linezolid  600 mg IntraVENous Q12H    [Held by provider] losartan  100 mg Oral Daily    And    [Held by provider] hydroCHLOROthiazide  12.5 mg Oral Daily    sodium chloride flush  5-40 mL IntraVENous 2 times per day    aspirin  81 mg Oral Daily    atorvastatin  80 mg Oral Nightly    lidocaine  2 patch TransDERmal Daily    insulin glargine  15 Units SubCUTAneous Nightly    insulin lispro  4 Units SubCUTAneous TID AC    PRN Meds: oxyCODONE **OR** oxyCODONE, sodium chloride flush, sodium chloride, magnesium sulfate, ondansetron **OR** ondansetron, acetaminophen **OR** acetaminophen, polyethylene glycol, traZODone, glucose, dextrose bolus 
hydroCHLOROthiazide  12.5 mg Oral Daily    sodium chloride flush  5-40 mL IntraVENous 2 times per day    aspirin  81 mg Oral Daily    atorvastatin  80 mg Oral Nightly    lidocaine  2 patch TransDERmal Daily    insulin glargine  15 Units SubCUTAneous Nightly    insulin lispro  4 Units SubCUTAneous TID AC     ROS: All neg unless specifically mentioned in subjective section.     Exam:  General Appearance: alert ,conversing, in no acute distress  Cardiovascular: normal rate, regular rhythm, normal S1 and S2, no murmurs, rubs, clicks, or gallops  Pulmonary/Chest: clear to auscultation R side.  Mild decrease BS left lung base.  Neurological: alert, oriented, normal speech, no focal findings or movement disorder noted    Assessment:   Patient Active Problem List   Diagnosis    Type 2 diabetes mellitus treated with insulin (HCC)    Acidosis    BERNARDO (acute kidney injury) ([facility]) from DKA    Diabetic ketoacidosis without coma associated with type 1 diabetes mellitus    HTN (hypertension)    Nausea & vomiting    NSTEMI (non-ST elevated myocardial infarction) ([facility])    Loculated pleural effusion    SOB (shortness of breath)    Empyema ([facility])    Acute coronary syndrome ([facility])    Pneumonia of left lung due to infectious organism    Penicillin allergy    Tobacco smoke exposure     Plan:   Lt thoracotomy with decortication tomorrow morning.      The plan of care was discussed in detail with Dr. Rose Stokes PA-C     
neg unless specifically mentioned in subjective section.     Exam:  General Appearance: alert ,conversing, in no acute distress  Cardiovascular: normal rate, regular rhythm, normal S1 and S2, no murmurs, rubs, clicks, or gallops  Pulmonary/Chest: clear to auscultation bilaterally- no wheezes, rales or rhonchi, normal air movement, no respiratory distress  Neurological: alert, oriented, normal speech, no focal findings or movement disorder noted    Assessment:   Patient Active Problem List   Diagnosis    Type 2 diabetes mellitus treated with insulin ([facility])    Acidosis    BERNARDO (acute kidney injury) ([facility]) from DKA    Diabetic ketoacidosis without coma associated with type 1 diabetes mellitus ([facility])    HTN (hypertension)    Nausea & vomiting    NSTEMI (non-ST elevated myocardial infarction) ([facility])    Loculated pleural effusion    SOB (shortness of breath)     Plan:   CXR reviewed- Continue daily CXR's   TPA/Dornase later this morning.  CXR this morning pending.  Continue current therapy    The plan of care was discussed in detail with Dr. Rose Stokes PA-C   
reports current alcohol use.    Social History     Substance and Sexual Activity   Drug Use Never         Family History:      History reviewed. No pertinent family history.    PHYSICAL EXAM:    /67   Pulse 98   Temp 97.8 °F (36.6 °C) (Oral)   Resp 16   Ht 1.803 m (5' 11\")   Wt 74.9 kg (165 lb 3.2 oz)   SpO2 93%   BMI 23.04 kg/m²     General appearance:  No apparent distress, appears stated age and cooperative.  HEENT:  Normal cephalic, atraumatic without obvious deformity. Pupils equal, round, and reactive to light.  Extra ocular muscles intact. Conjunctivae/corneas clear.  Neck: Supple, with full range of motion. No jugular venous distention. Trachea midline.  Respiratory:  Normal respiratory effort. Decreased breath sounds left base  Cardiovascular:  Regular rate and rhythm with normal S1/S2 without murmurs, rubs or gallops.  Abdomen: Soft, non-tender, non-distended with normal bowel sounds.  Musculoskeletal:  No clubbing, cyanosis or edema bilaterally.  Full range of motion without deformity.  Skin: Skin color, texture, turgor normal.  No rashes or lesions.  Neurologic:  Neurovascularly intact without any focal sensory/motor deficits. Cranial nerves: II-XII intact, grossly non-focal.  Psychiatric:  Alert and oriented, thought content appropriate, normal insight  Capillary Refill: Brisk,< 3 seconds   Peripheral Pulses: +2 palpable, equal bilaterally       Labs:     Recent Labs     03/28/25  1220 03/29/25  0617 03/30/25  0612   WBC 17.5* 15.0* 15.3*   HGB 8.8* 8.9* 8.3*   HCT 27.3* 27.4* 26.1*   * 446* 464*     Recent Labs     03/28/25  1220 03/29/25  0617 03/30/25  0612 03/30/25  0858   * 129* 131*  --    K 4.6 4.4 5.6* 4.6   CL 98 98 99  --    CO2 24 24 26  --    BUN 28* 28* 28*  --    CREATININE 1.2 1.2 1.2  --    CALCIUM 8.2* 7.9* 8.5*  --      No results for input(s): \"AST\", \"ALT\", \"BILIDIR\", \"BILITOT\", \"ALKPHOS\" in the last 72 hours.  No results for input(s): \"INR\" in the last 72 
   insulin lispro  0-16 Units SubCUTAneous 4x Daily AC & HS    sodium chloride flush  5-40 mL IntraVENous 2 times per day    enoxaparin  40 mg SubCUTAneous Daily    docusate sodium  100 mg Oral BID    polyethylene glycol  17 g Oral Daily    acetaminophen  1,000 mg Oral 4 times per day    sodium chloride flush  5-40 mL IntraVENous 2 times per day    aspirin  81 mg Oral Daily    atorvastatin  80 mg Oral Nightly    lidocaine  2 patch TransDERmal Daily    PRN Meds: sodium chloride flush, sodium chloride, nicotine polacrilex, morphine **OR** morphine, promethazine **OR** ondansetron, albuterol sulfate HFA, glucose, prochlorperazine, oxyCODONE **OR** oxyCODONE, sodium chloride, ondansetron **OR** ondansetron, traZODone, dextrose bolus **OR** dextrose bolus, glucagon (rDNA), dextrose    Exam:  BP (!) 163/89   Pulse (!) 105   Temp 98.3 °F (36.8 °C) (Oral)   Resp 19   Ht 1.803 m (5' 11\")   Wt 76.4 kg (168 lb 6.9 oz)   SpO2 95%   BMI 23.49 kg/m²   General: No distress, appears stated age.  Eyes:  PERRL. Conjunctivae/corneas clear.  HENT: Head normal appearing. Nares normal. Oral mucosa moist.  Hearing intact.   Neck: Supple, with full range of motion. Trachea midline.  No gross JVD appreciated.  Respiratory:  Normal effort. Clear to auscultation, without rales or wheezes or rhonchi.  Cardiovascular: Normal rate, regular rhythm with normal S1/S2 without murmurs.    No lower extremity edema.   Abdomen: Soft, non-tender, non-distended with normal bowel sounds.  Musculoskeletal: No joint swelling or tenderness. Normal tone. No abnormal movements.   Skin: Warm and dry.  First left toe wound.  Neurologic:  No focal sensory/motor deficits in the upper or lower extremities. Cranial nerves:  grossly non-focal 2-12.     Psychiatric: Alert and oriented, normal insight and thought content.   Capillary Refill: Brisk,< 3 seconds.  Peripheral Pulses: +2 palpable, equal bilaterally.       Labs/Radiology: See chart or assessment above. 
11\")   Wt 74.9 kg (165 lb 3.2 oz)   SpO2 93%   BMI 23.04 kg/m²   General: No distress, appears stated age.  Eyes:  PERRL. Conjunctivae/corneas clear.  HENT: Head normal appearing. Nares normal. Oral mucosa moist.  Hearing intact.   Neck: Supple, with full range of motion. Trachea midline.  No gross JVD appreciated.  Respiratory:  Normal effort on NC. Breathing diminished on left side   Cardiovascular: Normal rate, regular rhythm with normal S1/S2 without murmurs.    No lower extremity edema.   Abdomen: Soft, non-tender, non-distended with normal bowel sounds.  Musculoskeletal: No joint swelling or tenderness. Normal tone. No abnormal movements.   Skin: Warm and dry. No rashes or lesions.  Neurologic:  No focal sensory/motor deficits in the upper or lower extremities. Cranial nerves:  grossly non-focal 2-12.     Psychiatric: Alert and oriented, normal insight and thought content.   Capillary Refill: Brisk,< 3 seconds.  Peripheral Pulses: +2 palpable, equal bilaterally.       Labs/Radiology: See chart or assessment above.     Electronically signed by BEATRIZ Cruz on 3/30/2025 at 3:02 PM    
SubCUTAneous BID    furosemide  20 mg IntraVENous BID    potassium chloride  20 mEq Oral Daily with breakfast    lisinopril  40 mg Oral Daily    insulin lispro  0-16 Units SubCUTAneous 4x Daily AC & HS    sodium chloride flush  5-40 mL IntraVENous 2 times per day    enoxaparin  40 mg SubCUTAneous Daily    docusate sodium  100 mg Oral BID    polyethylene glycol  17 g Oral Daily    cefTRIAXone (ROCEPHIN) IV  2,000 mg IntraVENous Q24H    acetaminophen  1,000 mg Oral 4 times per day    sodium chloride flush  5-40 mL IntraVENous 2 times per day    aspirin  81 mg Oral Daily    atorvastatin  80 mg Oral Nightly    lidocaine  2 patch TransDERmal Daily    PRN Meds: sodium chloride flush, sodium chloride, nicotine polacrilex, morphine **OR** morphine, promethazine **OR** ondansetron, albuterol sulfate HFA, glucose, prochlorperazine, oxyCODONE **OR** oxyCODONE, sodium chloride, ondansetron **OR** ondansetron, traZODone, dextrose bolus **OR** dextrose bolus, glucagon (rDNA), dextrose    Exam:  /75   Pulse 99   Temp 98.2 °F (36.8 °C) (Oral)   Resp 18   Ht 1.803 m (5' 11\")   Wt 76.3 kg (168 lb 4.8 oz)   SpO2 95%   BMI 23.47 kg/m²   General: No distress, appears stated age.  Eyes:  EOMI. Conjunctivae/corneas clear.  HENT: Head normal appearing. Nares normal. Oral mucosa moist.  Hearing intact.   Neck: Supple, with full range of motion. No gross JVD appreciated.  Respiratory:  Normal effort. Diminished lung sounds, without rales or wheezes or rhonchi. Chest tube with 60 cc overnight.   Cardiovascular: Normal rate, regular rhythm with normal S1/S2 without murmurs.    No lower extremity edema.   Abdomen: Soft, non-tender, non-distended with normal bowel sounds.  Musculoskeletal: No joint swelling or tenderness. Normal tone. No abnormal movements.   Skin: Warm and dry.  First left toe wound.  Neurologic:  No focal sensory/motor deficits in the upper or lower extremities. Cranial nerves:  grossly non-focal 2-12.   
dextrose    Exam:  BP (!) 143/79   Pulse (!) 105   Temp 98.2 °F (36.8 °C) (Oral)   Resp 16   Ht 1.803 m (5' 11\")   Wt 73.1 kg (161 lb 1.6 oz)   SpO2 92%   BMI 22.47 kg/m²   General: No distress, appears stated age.  Eyes:  PERRL. Conjunctivae/corneas clear.  HENT: Head normal appearing. Nares normal. Oral mucosa moist.  Hearing intact.   Neck: Supple, with full range of motion. Trachea midline.  No gross JVD appreciated.  Respiratory:  Normal effort on NC. Breath sounds improving on left side   Cardiovascular: Normal rate, regular rhythm with normal S1/S2 without murmurs.    No lower extremity edema.   Abdomen: Soft, non-tender, non-distended with normal bowel sounds.  Musculoskeletal: No joint swelling or tenderness. Normal tone. No abnormal movements.   Skin: Warm and dry. No rashes or lesions.  Neurologic:  No focal sensory/motor deficits in the upper or lower extremities. Cranial nerves:  grossly non-focal 2-12.     Psychiatric: Alert and oriented, normal insight and thought content.   Capillary Refill: Brisk,< 3 seconds.  Peripheral Pulses: +2 palpable, equal bilaterally.       Labs/Radiology: See chart or assessment above.     Electronically signed by BEATRIZ Cruz on 3/31/2025 at 2:41 PM    
distress, appears stated age.  Eyes:  PERRL. Conjunctivae/corneas clear.  HENT: Head normal appearing. Nares normal. Oral mucosa moist.  Hearing intact.   Neck: Supple, with full range of motion. Trachea midline.  No gross JVD appreciated.  Respiratory:  Normal effort on NC. Breathing diminished on left side   Cardiovascular: Normal rate, regular rhythm with normal S1/S2 without murmurs.    No lower extremity edema.   Abdomen: Soft, non-tender, non-distended with normal bowel sounds.  Musculoskeletal: No joint swelling or tenderness. Normal tone. No abnormal movements.   Skin: Warm and dry. No rashes or lesions.  Neurologic:  No focal sensory/motor deficits in the upper or lower extremities. Cranial nerves:  grossly non-focal 2-12.     Psychiatric: Alert and oriented, normal insight and thought content.   Capillary Refill: Brisk,< 3 seconds.  Peripheral Pulses: +2 palpable, equal bilaterally.       Labs/Radiology: See chart or assessment above.     Electronically signed by BEATRIZ Cruz on 3/27/2025 at 4:49 PM    
every 8 hours as needed for Pain 21 tablet 0    Insulin Pen Needle 32G X 4 MM MISC 1 each by Does not apply route daily 100 each 3    traZODone (DESYREL) 100 MG tablet Take 0.5 tablets by mouth nightly as needed for Sleep 10 tablet 0       REVIEW OF SYSTEMS    10 point review of systems performed      Objective:      /73   Pulse (!) 102   Temp 98.1 °F (36.7 °C) (Oral)   Resp 18   Ht 1.803 m (5' 11\")   Wt 74.9 kg (165 lb 3.2 oz)   SpO2 92%   BMI 23.04 kg/m²     Wt Readings from Last 3 Encounters:   03/30/25 74.9 kg (165 lb 3.2 oz)   03/26/25 65.8 kg (145 lb)   09/03/23 63 kg (139 lb)         There is no immunization history on file for this patient.    PHYSICAL EXAM    /73   Pulse (!) 102   Temp 98.1 °F (36.7 °C) (Oral)   Resp 18   Ht 1.803 m (5' 11\")   Wt 74.9 kg (165 lb 3.2 oz)   SpO2 92%   BMI 23.04 kg/m²   General:  Awake, alert, not in distress.  HEENT: pink conjunctiva, unicteric sclera, moist oral mucosa.  Chest: Reduced auscultation in left lower lobe  Cardiovascular:  RRR ,S1S2, no murmur or gallop.  Abdomen:  Soft, non tender to palpation.  Extremities: Normal.  Skin:  Warm and dry.  CNS: Alert and oriented x 3    Wound 03/26/25 Back Lateral;Left (Active)   Dressing Status Other (Comment) 03/28/25 2116   Drainage Amount None (dry) 03/28/25 0800   Odor None 03/28/25 0800   Margins Defined edges 03/27/25 2109   Number of days: 2       LABS       CBC:   Lab Results   Component Value Date/Time    WBC 15.3 03/30/2025 06:12 AM    HGB 8.3 03/30/2025 06:12 AM    HCT 26.1 03/30/2025 06:12 AM    MCV 89.7 03/30/2025 06:12 AM     03/30/2025 06:12 AM     BMP:   Lab Results   Component Value Date/Time     03/30/2025 06:12 AM    K 5.6 03/30/2025 06:12 AM    CL 99 03/30/2025 06:12 AM    CO2 26 03/30/2025 06:12 AM    PHOS 2.9 09/03/2023 05:20 AM    BUN 28 03/30/2025 06:12 AM    CREATININE 1.2 03/30/2025 06:12 AM     PT/INR:   Lab Results   Component Value Date/Time    PROTIME 13.2 
lidocaine  2 patch TransDERmal Daily    insulin glargine  15 Units SubCUTAneous Nightly    insulin lispro  4 Units SubCUTAneous TID AC    PRN Meds: prochlorperazine, oxyCODONE **OR** oxyCODONE, sodium chloride flush, sodium chloride, magnesium sulfate, ondansetron **OR** ondansetron, traZODone, glucose, dextrose bolus **OR** dextrose bolus, glucagon (rDNA), dextrose    Exam:  /83   Pulse 94   Temp 97.8 °F (36.6 °C) (Oral)   Resp 16   Ht 1.803 m (5' 11\")   Wt 72.6 kg (160 lb 1.6 oz)   SpO2 98%   BMI 22.33 kg/m²   General: No distress, appears stated age.  Eyes:  PERRL. Conjunctivae/corneas clear.  HENT: Head normal appearing. Nares normal. Oral mucosa moist.  Hearing intact.   Neck: Supple, with full range of motion. Trachea midline.  No gross JVD appreciated.  Respiratory:  Normal effort on NC. Breath sounds improving on left side   Cardiovascular: Normal rate, regular rhythm with normal S1/S2 without murmurs.    No lower extremity edema.   Abdomen: Soft, non-tender, non-distended with normal bowel sounds.  Musculoskeletal: No joint swelling or tenderness. Normal tone. No abnormal movements.   Skin: Warm and dry. No rashes or lesions.  Neurologic:  No focal sensory/motor deficits in the upper or lower extremities. Cranial nerves:  grossly non-focal 2-12.     Psychiatric: Alert and oriented, normal insight and thought content.   Capillary Refill: Brisk,< 3 seconds.  Peripheral Pulses: +2 palpable, equal bilaterally.       Labs/Radiology: See chart or assessment above.     Electronically signed by BEATRIZ Cruz on 4/1/2025 at 1:29 PM    
review of systems performed      Objective:      /76   Pulse 94   Temp 98 °F (36.7 °C) (Oral)   Resp 20   Ht 1.803 m (5' 11\")   Wt 74.8 kg (164 lb 14.4 oz)   SpO2 95%   BMI 23.00 kg/m²     Wt Readings from Last 3 Encounters:   03/29/25 74.8 kg (164 lb 14.4 oz)   03/26/25 65.8 kg (145 lb)   09/03/23 63 kg (139 lb)         There is no immunization history on file for this patient.    PHYSICAL EXAM    /76   Pulse 94   Temp 98 °F (36.7 °C) (Oral)   Resp 20   Ht 1.803 m (5' 11\")   Wt 74.8 kg (164 lb 14.4 oz)   SpO2 95%   BMI 23.00 kg/m²   General:  Awake, alert, not in distress.  HEENT: pink conjunctiva, unicteric sclera, moist oral mucosa.  Chest: Reduced auscultation in left lower lobe  Cardiovascular:  RRR ,S1S2, no murmur or gallop.  Abdomen:  Soft, non tender to palpation.  Extremities: Normal.  Skin:  Warm and dry.  CNS: Alert and oriented x 3    Wound 03/26/25 Back Lateral;Left (Active)   Dressing Status Other (Comment) 03/28/25 2116   Drainage Amount None (dry) 03/28/25 0800   Odor None 03/28/25 0800   Margins Defined edges 03/27/25 2109   Number of days: 2       LABS       CBC:   Lab Results   Component Value Date/Time    WBC 15.0 03/29/2025 06:17 AM    HGB 8.9 03/29/2025 06:17 AM    HCT 27.4 03/29/2025 06:17 AM    MCV 88.7 03/29/2025 06:17 AM     03/29/2025 06:17 AM     BMP:   Lab Results   Component Value Date/Time     03/29/2025 06:17 AM    K 4.4 03/29/2025 06:17 AM    CL 98 03/29/2025 06:17 AM    CO2 24 03/29/2025 06:17 AM    PHOS 2.9 09/03/2023 05:20 AM    BUN 28 03/29/2025 06:17 AM    CREATININE 1.2 03/29/2025 06:17 AM     PT/INR:   Lab Results   Component Value Date/Time    PROTIME 13.2 03/26/2025 08:45 AM    INR 1.18 03/26/2025 08:45 PM     Prealbumin: No results found for: \"PREALBUMIN\"  Albumin:No results found for: \"LABALBU\"  Sed Rate:No results found for: \"SEDRATE\"  CRP: No results found for: \"CRP\"  Micro: No results found for: \"BC\"   Hemoglobin A1C:   Lab 
03/30/2025 06:12 AM    PHOS 2.9 09/03/2023 05:20 AM    BUN 28 03/30/2025 06:12 AM    CREATININE 1.2 03/30/2025 06:12 AM     PT/INR:   Lab Results   Component Value Date/Time    PROTIME 13.2 03/26/2025 08:45 AM    INR 1.18 03/26/2025 08:45 PM     Prealbumin: No results found for: \"PREALBUMIN\"  Albumin:No results found for: \"LABALBU\"  Sed Rate:No results found for: \"SEDRATE\"  CRP: No results found for: \"CRP\"  Micro:   Lab Results   Component Value Date/Time    BC No growth 24 hours. No growth 48 hours. 03/28/2025 02:48 PM    BC No growth 24 hours. No growth 48 hours. 03/28/2025 02:48 PM      Hemoglobin A1C:   Lab Results   Component Value Date/Time    LABA1C 8.7 03/27/2025 07:34 AM           Electronically signed by Saad Robbins MD TD@ at 6:56 AM    
13.2 03/26/2025 08:45 AM    INR 1.16 04/01/2025 08:45 PM     Prealbumin: No results found for: \"PREALBUMIN\"  Albumin:No results found for: \"LABALBU\"  Sed Rate:No results found for: \"SEDRATE\"  CRP: No results found for: \"CRP\"  Micro:   Lab Results   Component Value Date/Time    BC No growth 24 hours. No growth 48 hours. 03/28/2025 02:48 PM    BC No growth 24 hours. No growth 48 hours. 03/28/2025 02:48 PM      Hemoglobin A1C:   Lab Results   Component Value Date/Time    LABA1C 8.7 03/27/2025 07:34 AM           Electronically signed by Saad Robbins MD TD@ at 8:42 AM    
4/5/2025 at 3:14 PM  Case was discussed with Attending, Dr. Huang.    
AM    HCT 27.8 04/03/2025 05:46 AM    MCV 86.9 04/03/2025 05:46 AM     04/03/2025 05:46 AM     BMP:   Lab Results   Component Value Date/Time     04/03/2025 05:46 AM    K 5.0 04/03/2025 05:46 AM    CL 99 04/03/2025 05:46 AM    CO2 22 04/03/2025 05:46 AM    PHOS 2.9 09/03/2023 05:20 AM    BUN 42 04/03/2025 05:46 AM    CREATININE 1.5 04/03/2025 05:46 AM     PT/INR:   Lab Results   Component Value Date/Time    PROTIME 13.2 03/26/2025 08:45 AM    INR 1.16 04/01/2025 08:45 PM     Prealbumin: No results found for: \"PREALBUMIN\"  Albumin:No results found for: \"LABALBU\"  Sed Rate:No results found for: \"SEDRATE\"  CRP: No results found for: \"CRP\"  Micro:   Lab Results   Component Value Date/Time    BC  03/28/2025 02:48 PM     No growth 24 hours. No growth 48 hours. No growth at 5 days    BC  03/28/2025 02:48 PM     No growth 24 hours. No growth 48 hours. No growth at 5 days      Hemoglobin A1C:   Lab Results   Component Value Date/Time    LABA1C 8.7 03/27/2025 07:34 AM           Electronically signed by Saad Robbins MD TD@ at 6:52 AM    
Therapy, Plan of Care  Education Method: Verbal  Education Outcome: Verbalized understanding, Demonstrated understanding, Continued education needed       Plan:  Current Treatment Recommendations: Strengthening, Balance training, Gait training, Stair training, Functional mobility training, Transfer training, Neuromuscular re-education, Endurance training, Equipment evaluation, education, & procurement, Patient/Caregiver education & training, Safety education & training, Therapeutic activities, Home exercise program  General Plan:  (3-5x GM)    Goals:  Patient Goals : get back to my normal self  Short Term Goals  Time Frame for Short Term Goals: by discharge  Short Term Goal 1: Pt will demo sit to/from transfers with no AD to return  home safely.  Short Term Goal 2: Pt will demo IND with gait for community distances with no AD to return home safely.  Short Term Goal 3: Pt will negotiate 1 step with IND With no AD to return home safely.  Long Term Goals  Time Frame for Long Term Goals : NA due to short ELOS    Following session, patient left in safe position with all fall risk precautions in place. Pt in bed following session, all needs and call light in reach.          
550 ml   Net -500 ml     I/O last 3 completed shifts:  In: 170 [P.O.:170]  Out: 910 [Urine:850; Chest Tube:60]   Patient Vitals for the past 96 hrs (Last 3 readings):   Weight   04/02/25 0540 72.5 kg (159 lb 12.8 oz)   04/01/25 0600 72.6 kg (160 lb 1.6 oz)   03/31/25 0600 73.1 kg (161 lb 1.6 oz)       Exam   Nursing note and vitals reviewed.  Physical Exam  Constitutional:       Appearance: Normal appearance. He is normal weight.   HENT:      Head: Normocephalic and atraumatic.      Nose: Nose normal. No congestion or rhinorrhea.      Comments: NC present  Eyes:      Pupils: Pupils are equal, round, and reactive to light.   Cardiovascular:      Rate and Rhythm: Normal rate and regular rhythm.      Pulses: Normal pulses.      Heart sounds: Normal heart sounds.   Pulmonary:      Effort: Pulmonary effort is normal.      Breath sounds: Normal breath sounds.      Comments: Chest tube present on the left side  Abdominal:      General: Abdomen is flat. Bowel sounds are normal.      Palpations: Abdomen is soft.   Musculoskeletal:         General: No swelling or tenderness.      Cervical back: Normal range of motion and neck supple.   Skin:     General: Skin is warm.      Capillary Refill: Capillary refill takes less than 2 seconds.   Neurological:      General: No focal deficit present.      Mental Status: He is alert and oriented to person, place, and time. Mental status is at baseline.   Psychiatric:         Mood and Affect: Mood normal.         Behavior: Behavior normal.         Thought Content: Thought content normal.         Judgment: Judgment normal.          Labs  - Old records and notes have been reviewed in [facility]   ABG  No results found for: \"PH\", \"PO2\", \"PCO2\", \"HCO3\", \"O2SAT\"  Lab Results   Component Value Date/Time    MODE NOT REPORTED 02/18/2020 12:30 PM     CBC  Recent Labs     04/01/25 2045 04/02/25 0545   WBC 12.6* 11.2*   RBC 3.40* 3.38*   HGB 9.4* 9.5*   HCT 29.7* 29.3*   MCV 87.4 86.7   MCH 27.6 28.1 
left thoracotomy with complete decortication of the left lung right Dr. Gavin Rose MD from cardiothoracic surgery service  Having her 2 large-bore chest tubes and left side of the chest  Chest tube and pleural effusion management by cardiothoracic surgery service with Dr. Gavin Rose MD  He was advised to continue to use incentive spirometry every 4 hourly as tolerated  Patient to follow-up with his family physician and cardiothoracic surgery service after discharge from the hospital  -Will sign off on the case from pulmonary point of view.   -Will follow PRN.   -Call [phone] with questions.    Electronically signed by   Susana Munoz MD on 4/4/2025 at 7:13 PM

## 2025-04-07 NOTE — DISCHARGE INSTRUCTIONS
Room.    If you have questions regarding these instructions, please call our office  [phone].  We have an answering service 24 hours a day to get your calls to the ON Call Physician, but we are often in surgery and it takes us awhile to get back to you.

## 2025-04-09 ENCOUNTER — TELEPHONE (OUTPATIENT)
Dept: CARDIOTHORACIC SURGERY | Age: 65
End: 2025-04-09

## 2025-04-09 NOTE — TELEPHONE ENCOUNTER
S/P Left thoracotomy with Decortication w/ Dr. Rose 4/04/2025.    Follow up with CT [facility] office 04/29/2025.      Alanna with [facility] left a message at 11:53 am.    Stating patient has two sutures that need to be removed from chest tube site, and the patient was told their office could remove the sutures.     Alanna is verifying ok to remove sutures?     Please advise, thank you.     Alanna can be reached at [phone] extension [phone]

## 2025-04-14 LAB
AFB CULTURE & SMEAR: NORMAL
AFB CULTURE & SMEAR: NORMAL

## 2025-04-21 ENCOUNTER — OFFICE VISIT (OUTPATIENT)
Dept: INFECTIOUS DISEASES | Age: 65
End: 2025-04-21
Payer: COMMERCIAL

## 2025-04-21 VITALS
DIASTOLIC BLOOD PRESSURE: 88 MMHG | OXYGEN SATURATION: 98 % | HEART RATE: 92 BPM | RESPIRATION RATE: 18 BRPM | SYSTOLIC BLOOD PRESSURE: 144 MMHG | HEIGHT: 71 IN | TEMPERATURE: 97.8 F | BODY MASS INDEX: 19.71 KG/M2 | WEIGHT: 140.8 LBS

## 2025-04-21 DIAGNOSIS — N17.9 AKI (ACUTE KIDNEY INJURY): ICD-10-CM

## 2025-04-21 DIAGNOSIS — Z98.890 HISTORY OF THORACOTOMY: ICD-10-CM

## 2025-04-21 DIAGNOSIS — J86.9 EMPYEMA (HCC): Primary | ICD-10-CM

## 2025-04-21 DIAGNOSIS — E11.9 TYPE 2 DIABETES MELLITUS TREATED WITH INSULIN (HCC): ICD-10-CM

## 2025-04-21 DIAGNOSIS — Z79.4 TYPE 2 DIABETES MELLITUS TREATED WITH INSULIN (HCC): ICD-10-CM

## 2025-04-21 DIAGNOSIS — R11.2 NAUSEA AND VOMITING, UNSPECIFIED VOMITING TYPE: ICD-10-CM

## 2025-04-21 LAB
AFB CULTURE & SMEAR: NORMAL
AFB CULTURE & SMEAR: NORMAL

## 2025-04-21 PROCEDURE — G8427 DOCREV CUR MEDS BY ELIG CLIN: HCPCS | Performed by: STUDENT IN AN ORGANIZED HEALTH CARE EDUCATION/TRAINING PROGRAM

## 2025-04-21 PROCEDURE — 3079F DIAST BP 80-89 MM HG: CPT | Performed by: STUDENT IN AN ORGANIZED HEALTH CARE EDUCATION/TRAINING PROGRAM

## 2025-04-21 PROCEDURE — 1111F DSCHRG MED/CURRENT MED MERGE: CPT | Performed by: STUDENT IN AN ORGANIZED HEALTH CARE EDUCATION/TRAINING PROGRAM

## 2025-04-21 PROCEDURE — 99214 OFFICE O/P EST MOD 30 MIN: CPT | Performed by: STUDENT IN AN ORGANIZED HEALTH CARE EDUCATION/TRAINING PROGRAM

## 2025-04-21 PROCEDURE — 4004F PT TOBACCO SCREEN RCVD TLK: CPT | Performed by: STUDENT IN AN ORGANIZED HEALTH CARE EDUCATION/TRAINING PROGRAM

## 2025-04-21 PROCEDURE — G8420 CALC BMI NORM PARAMETERS: HCPCS | Performed by: STUDENT IN AN ORGANIZED HEALTH CARE EDUCATION/TRAINING PROGRAM

## 2025-04-21 PROCEDURE — 3017F COLORECTAL CA SCREEN DOC REV: CPT | Performed by: STUDENT IN AN ORGANIZED HEALTH CARE EDUCATION/TRAINING PROGRAM

## 2025-04-21 PROCEDURE — 3077F SYST BP >= 140 MM HG: CPT | Performed by: STUDENT IN AN ORGANIZED HEALTH CARE EDUCATION/TRAINING PROGRAM

## 2025-04-21 PROCEDURE — 2022F DILAT RTA XM EVC RTNOPTHY: CPT | Performed by: STUDENT IN AN ORGANIZED HEALTH CARE EDUCATION/TRAINING PROGRAM

## 2025-04-21 PROCEDURE — 3052F HG A1C>EQUAL 8.0%<EQUAL 9.0%: CPT | Performed by: STUDENT IN AN ORGANIZED HEALTH CARE EDUCATION/TRAINING PROGRAM

## 2025-04-21 RX ORDER — ALBUTEROL SULFATE 90 UG/1
2 INHALANT RESPIRATORY (INHALATION) EVERY 6 HOURS PRN
COMMUNITY
Start: 2025-04-18

## 2025-04-21 RX ORDER — INSULIN PMP CART,AUT,G6/7,CNTR
200 EACH SUBCUTANEOUS
COMMUNITY
Start: 2025-03-30

## 2025-04-21 RX ORDER — DOCUSATE SODIUM 100 MG/1
100 CAPSULE, LIQUID FILLED ORAL DAILY
COMMUNITY
Start: 2025-04-10

## 2025-04-21 RX ORDER — DEXTROSE 3.75 G
4 TABLET,CHEWABLE ORAL PRN
COMMUNITY
Start: 2025-02-24

## 2025-04-21 RX ORDER — PROCHLORPERAZINE 25 MG/1
SUPPOSITORY RECTAL
COMMUNITY
Start: 2025-04-18

## 2025-04-24 ENCOUNTER — HOSPITAL ENCOUNTER (OUTPATIENT)
Dept: GENERAL RADIOLOGY | Age: 65
Discharge: HOME | End: 2025-04-26
Payer: COMMERCIAL

## 2025-04-24 DIAGNOSIS — J86.9 EMPYEMA (HCC): ICD-10-CM

## 2025-04-24 DIAGNOSIS — Z98.890 HISTORY OF THORACOTOMY: ICD-10-CM

## 2025-04-24 PROCEDURE — 71046 X-RAY EXAM CHEST 2 VIEWS: CPT

## 2025-04-24 NOTE — PROGRESS NOTES
Progress note: Infectious diseases    Patient - Dona Kaur  Age - 64 y.o.      - 1960      Date- 2025        ASSESSMENT/PLAN   1. Empyema (HCC)    Patient is a 64-year-old male who I am following for left-sided empyema with Streptococcus intermedius obtained from aspiration.  Ultimately, patient underwent left-sided thoracotomy with decortication on 2025.  I did transition patient to therapy with Omnicef as I was unable to pursue Augmentin due to listed penicillin allergy.  He is otherwise been tolerating therapy just fine and feels that his breathing is near baseline.  He does have some discomfort along the midline chest which appears to be consistent with costochondritis.    No further infectious disease follow-up though I will recommend x-ray of the chest  Placed orders for chest x-ray  Continue Omnicef to complete course of 21 days following hospitalization  No further infectious disease follow-up unless other concerns for infection  Does have follow-up scheduled with cardiothoracic surgery    - XR CHEST STANDARD (2 VW); Future    2. History of thoracotomy    - XR CHEST STANDARD (2 VW); Future    3. BERNARDO (acute kidney injury) (HCC) from DKA      4. Type 2 diabetes mellitus treated with insulin ([facility])      5. Nausea and vomiting, unspecified vomiting type          SUBJECTIVE:     Patient is a 64-year-old male who I am consulted and following for a fluid collection present in the left chest.  CT scan demonstrated evidence of a loculated pleural effusion and he had undergone CT-guided chest tube placement on .  There was evidence of gram-positive cocci in clusters which then subsequently grew Streptococcus intermedius.  CT surgery evaluated patient and ultimately performed left sided thoracotomy with decortication on 2025.  He has otherwise been doing well since discharge.  He is

## 2025-04-28 LAB
AFB CULTURE & SMEAR: NORMAL
AFB CULTURE & SMEAR: NORMAL

## 2025-04-29 ENCOUNTER — OFFICE VISIT (OUTPATIENT)
Dept: CARDIOTHORACIC SURGERY | Age: 65
End: 2025-04-29

## 2025-04-29 VITALS
HEART RATE: 97 BPM | SYSTOLIC BLOOD PRESSURE: 124 MMHG | HEIGHT: 71 IN | OXYGEN SATURATION: 99 % | BODY MASS INDEX: 19.32 KG/M2 | WEIGHT: 138 LBS | DIASTOLIC BLOOD PRESSURE: 62 MMHG

## 2025-04-29 DIAGNOSIS — Z98.890 S/P THORACOTOMY: Primary | ICD-10-CM

## 2025-04-29 RX ORDER — CEFDINIR 300 MG/1
300 CAPSULE ORAL 2 TIMES DAILY
COMMUNITY

## 2025-04-29 RX ORDER — HYDROCODONE BITARTRATE AND ACETAMINOPHEN 5; 325 MG/1; MG/1
1 TABLET ORAL EVERY 8 HOURS PRN
Qty: 21 TABLET | Refills: 0 | Status: SHIPPED | OUTPATIENT
Start: 2025-04-29 | End: 2025-05-06

## 2025-04-29 NOTE — PROGRESS NOTES
BERNARDO (acute kidney injury) (HCC) from DKA    Diabetic ketoacidosis without coma associated with type 1 diabetes mellitus (HCC)    HTN (hypertension)    Nausea & vomiting     Assessment:  S/p Left Thoracotomy with decortication    Plan: April 29, 2025  Continue current respiratory treatment including use with acapella and spirometer.  Advised to use pain medication sparingly  Follow up prn.    Thank you for allowing us to be involved in the patient's care.    Electronically by Mc Stokes PA-C  on 4/29/2025 at 11:14 AM

## 2025-05-05 LAB
AFB CULTURE & SMEAR: NORMAL
AFB CULTURE & SMEAR: NORMAL
FUNGUS SPEC CULT: NORMAL
FUNGUS SPEC CULT: NORMAL
FUNGUS SPEC FUNGUS STN: NORMAL
FUNGUS SPEC FUNGUS STN: NORMAL

## 2025-05-12 LAB
AFB CULTURE & SMEAR: NORMAL
AFB CULTURE & SMEAR: NORMAL

## 2025-05-20 LAB
AFB CULTURE & SMEAR: NORMAL
AFB CULTURE & SMEAR: NORMAL

## 2025-08-07 ENCOUNTER — HOSPITAL ENCOUNTER (OUTPATIENT)
Dept: GENERAL RADIOLOGY | Age: 65
Discharge: HOME | End: 2025-08-09
Payer: COMMERCIAL

## 2025-08-07 DIAGNOSIS — R06.00 DYSPNEA, UNSPECIFIED TYPE: ICD-10-CM

## 2025-08-07 PROCEDURE — 71046 X-RAY EXAM CHEST 2 VIEWS: CPT

## 2025-08-08 ENCOUNTER — TRANSCRIBE ORDERS (OUTPATIENT)
Dept: ADMINISTRATIVE | Age: 65
End: 2025-08-08

## 2025-08-08 ENCOUNTER — HOSPITAL ENCOUNTER (OUTPATIENT)
Dept: LAB | Age: 65
Discharge: HOME | End: 2025-08-08
Payer: COMMERCIAL

## 2025-08-08 DIAGNOSIS — R53.83 OTHER FATIGUE: Primary | ICD-10-CM

## 2025-08-08 LAB
ALBUMIN SERPL-MCNC: 4.2 G/DL (ref 3.5–5.2)
ALBUMIN/GLOB SERPL: 1.5 {RATIO} (ref 1–2.5)
ALP SERPL-CCNC: 117 U/L (ref 40–129)
ALT SERPL-CCNC: 90 U/L (ref 10–50)
ANION GAP SERPL CALCULATED.3IONS-SCNC: 11 MMOL/L (ref 9–16)
AST SERPL-CCNC: 54 U/L (ref 10–50)
BASOPHILS # BLD: 0.08 K/UL (ref 0–0.2)
BASOPHILS NFR BLD: 1 % (ref 0–2)
BILIRUB SERPL-MCNC: 0.4 MG/DL (ref 0–1.2)
BUN SERPL-MCNC: 39 MG/DL (ref 8–23)
BUN/CREAT SERPL: 33 (ref 9–20)
CALCIUM SERPL-MCNC: 9.8 MG/DL (ref 8.6–10.4)
CHLORIDE SERPL-SCNC: 103 MMOL/L (ref 98–107)
CHOLEST SERPL-MCNC: 148 MG/DL (ref 0–199)
CHOLESTEROL/HDL RATIO: 2.3
CO2 SERPL-SCNC: 26 MMOL/L (ref 20–31)
CREAT SERPL-MCNC: 1.2 MG/DL (ref 0.7–1.2)
EOSINOPHIL # BLD: 0.2 K/UL (ref 0–0.44)
EOSINOPHILS RELATIVE PERCENT: 3 % (ref 1–4)
ERYTHROCYTE [DISTWIDTH] IN BLOOD BY AUTOMATED COUNT: 13.8 % (ref 11.8–14.4)
GFR, ESTIMATED: 67 ML/MIN/1.73M2
GLUCOSE SERPL-MCNC: 130 MG/DL (ref 74–99)
HCT VFR BLD AUTO: 37.4 % (ref 40.7–50.3)
HDLC SERPL-MCNC: 63 MG/DL
HGB BLD-MCNC: 12.2 G/DL (ref 13–17)
IMM GRANULOCYTES # BLD AUTO: <0.03 K/UL (ref 0–0.3)
IMM GRANULOCYTES NFR BLD: 0 %
LDLC SERPL CALC-MCNC: 70 MG/DL (ref 0–100)
LYMPHOCYTES NFR BLD: 2.57 K/UL (ref 1.1–3.7)
LYMPHOCYTES RELATIVE PERCENT: 40 % (ref 24–43)
MCH RBC QN AUTO: 29.7 PG (ref 25.2–33.5)
MCHC RBC AUTO-ENTMCNC: 32.6 G/DL (ref 28.4–34.8)
MCV RBC AUTO: 91 FL (ref 82.6–102.9)
MONOCYTES NFR BLD: 0.56 K/UL (ref 0.1–1.2)
MONOCYTES NFR BLD: 9 % (ref 3–12)
NEUTROPHILS NFR BLD: 47 % (ref 36–65)
NEUTS SEG NFR BLD: 2.98 K/UL (ref 1.5–8.1)
NRBC BLD-RTO: 0 PER 100 WBC
PLATELET # BLD AUTO: 291 K/UL (ref 138–453)
PMV BLD AUTO: 10.1 FL (ref 8.1–13.5)
POTASSIUM SERPL-SCNC: 5.3 MMOL/L (ref 3.7–5.3)
PROT SERPL-MCNC: 7 G/DL (ref 6.6–8.7)
RBC # BLD AUTO: 4.11 M/UL (ref 4.21–5.77)
SHBG SERPL-SCNC: 67 NMOL/L (ref 19–76)
SODIUM SERPL-SCNC: 140 MMOL/L (ref 136–145)
T3FREE SERPL-MCNC: 3.29 PG/ML (ref 2–4.4)
T4 FREE SERPL-MCNC: 1.5 NG/DL (ref 0.92–1.68)
TESTOST FREE MFR SERPL: 81.4 PG/ML (ref 47–244)
TESTOST SERPL-MCNC: 616 NG/DL (ref 193–740)
TRIGL SERPL-MCNC: 75 MG/DL
TSH SERPL DL<=0.05 MIU/L-ACNC: 0.98 UIU/ML (ref 0.27–4.2)
VLDLC SERPL CALC-MCNC: 15 MG/DL (ref 1–30)
WBC OTHER # BLD: 6.4 K/UL (ref 3.5–11.3)

## 2025-08-08 PROCEDURE — 84270 ASSAY OF SEX HORMONE GLOBUL: CPT

## 2025-08-08 PROCEDURE — 80053 COMPREHEN METABOLIC PANEL: CPT

## 2025-08-08 PROCEDURE — 80061 LIPID PANEL: CPT

## 2025-08-08 PROCEDURE — 84439 ASSAY OF FREE THYROXINE: CPT

## 2025-08-08 PROCEDURE — 84403 ASSAY OF TOTAL TESTOSTERONE: CPT

## 2025-08-08 PROCEDURE — 84443 ASSAY THYROID STIM HORMONE: CPT

## 2025-08-08 PROCEDURE — 36415 COLL VENOUS BLD VENIPUNCTURE: CPT

## 2025-08-08 PROCEDURE — 84481 FREE ASSAY (FT-3): CPT

## 2025-08-08 PROCEDURE — 85025 COMPLETE CBC W/AUTO DIFF WBC: CPT

## 2025-08-13 ENCOUNTER — TRANSCRIBE ORDERS (OUTPATIENT)
Dept: ADMINISTRATIVE | Age: 65
End: 2025-08-13

## 2025-08-13 DIAGNOSIS — R91.8 OTHER NONSPECIFIC ABNORMAL FINDING OF LUNG FIELD: Primary | ICD-10-CM

## 2025-08-26 ENCOUNTER — HOSPITAL ENCOUNTER (OUTPATIENT)
Age: 65
Discharge: HOME | End: 2025-08-28
Payer: COMMERCIAL

## 2025-08-26 DIAGNOSIS — R53.83 OTHER FATIGUE: ICD-10-CM

## 2025-08-26 LAB
STRESS BASELINE DIAS BP: 86 MMHG
STRESS BASELINE HR: 99 BPM
STRESS BASELINE SYS BP: 148 MMHG
STRESS ESTIMATED WORKLOAD: 7.2 METS
STRESS EXERCISE DUR MIN: 6 MIN
STRESS EXERCISE DUR SEC: 9 SEC
STRESS PEAK DIAS BP: 86 MMHG
STRESS PEAK SYS BP: 148 MMHG
STRESS PERCENT HR ACHIEVED: 86 %
STRESS POST PEAK HR: 136 BPM
STRESS RATE PRESSURE PRODUCT: NORMAL BPM*MMHG
STRESS ST DEPRESSION: 0.5 MM
STRESS TARGET HR: 158 BPM

## 2025-08-26 PROCEDURE — 93018 CV STRESS TEST I&R ONLY: CPT | Performed by: INTERNAL MEDICINE

## 2025-08-26 PROCEDURE — 93017 CV STRESS TEST TRACING ONLY: CPT

## 2025-08-26 PROCEDURE — 93016 CV STRESS TEST SUPVJ ONLY: CPT | Performed by: INTERNAL MEDICINE

## 2025-09-04 ENCOUNTER — HOSPITAL ENCOUNTER (OUTPATIENT)
Dept: CT IMAGING | Age: 65
Discharge: HOME | End: 2025-09-06
Payer: COMMERCIAL

## 2025-09-04 ENCOUNTER — HOSPITAL ENCOUNTER (OUTPATIENT)
Age: 65
Discharge: HOME | End: 2025-09-06
Payer: COMMERCIAL

## 2025-09-04 VITALS
BODY MASS INDEX: 19.32 KG/M2 | DIASTOLIC BLOOD PRESSURE: 62 MMHG | SYSTOLIC BLOOD PRESSURE: 124 MMHG | WEIGHT: 138 LBS | HEIGHT: 71 IN

## 2025-09-04 DIAGNOSIS — R91.8 OTHER NONSPECIFIC ABNORMAL FINDING OF LUNG FIELD: ICD-10-CM

## 2025-09-04 DIAGNOSIS — R53.83 OTHER FATIGUE: ICD-10-CM

## 2025-09-04 LAB
ECHO AO SINUS VALSALVA DIAM: 3.3 CM
ECHO AO SINUS VALSALVA INDEX: 1.83 CM/M2
ECHO AO ST JNCT DIAM: 2.6 CM
ECHO AV CUSP MM: 2 CM
ECHO AV MEAN GRADIENT: 4 MMHG
ECHO AV MEAN VELOCITY: 0.9 M/S
ECHO AV PEAK GRADIENT: 7 MMHG
ECHO AV PEAK VELOCITY: 1.3 M/S
ECHO AV VELOCITY RATIO: 0.92
ECHO AV VTI: 27.1 CM
ECHO BSA: 1.77 M2
ECHO EST RA PRESSURE: 3 MMHG
ECHO LA AREA 2C: 18.9 CM2
ECHO LA AREA 4C: 18.9 CM2
ECHO LA MAJOR AXIS: 5 CM
ECHO LA MINOR AXIS: 5.3 CM
ECHO LA VOL BP: 57 ML (ref 18–58)
ECHO LA VOL MOD A2C: 56 ML (ref 18–58)
ECHO LA VOL MOD A4C: 56 ML (ref 18–58)
ECHO LA VOL/BSA BIPLANE: 32 ML/M2 (ref 16–34)
ECHO LA VOLUME INDEX MOD A2C: 31 ML/M2 (ref 16–34)
ECHO LA VOLUME INDEX MOD A4C: 31 ML/M2 (ref 16–34)
ECHO LV E' LATERAL VELOCITY: 7.29 CM/S
ECHO LV EDV A2C: 56 ML
ECHO LV EDV A4C: 81 ML
ECHO LV EDV INDEX A4C: 45 ML/M2
ECHO LV EDV NDEX A2C: 31 ML/M2
ECHO LV EF PHYSICIAN: 60 %
ECHO LV EJECTION FRACTION A2C: 63 %
ECHO LV EJECTION FRACTION A4C: 62 %
ECHO LV EJECTION FRACTION BIPLANE: 63 % (ref 55–100)
ECHO LV ESV A2C: 20 ML
ECHO LV ESV A4C: 31 ML
ECHO LV ESV INDEX A2C: 11 ML/M2
ECHO LV ESV INDEX A4C: 17 ML/M2
ECHO LV FRACTIONAL SHORTENING: 36 % (ref 28–44)
ECHO LV INTERNAL DIMENSION DIASTOLE INDEX: 2.44 CM/M2
ECHO LV INTERNAL DIMENSION DIASTOLIC: 4.4 CM (ref 4.2–5.9)
ECHO LV INTERNAL DIMENSION SYSTOLIC INDEX: 1.56 CM/M2
ECHO LV INTERNAL DIMENSION SYSTOLIC: 2.8 CM
ECHO LV IVSD: 1.2 CM (ref 0.6–1)
ECHO LV MASS 2D: 168.9 G (ref 88–224)
ECHO LV MASS INDEX 2D: 93.8 G/M2 (ref 49–115)
ECHO LV POSTERIOR WALL DIASTOLIC: 1 CM (ref 0.6–1)
ECHO LV RELATIVE WALL THICKNESS RATIO: 0.45
ECHO LVOT AV VTI INDEX: 0.92
ECHO LVOT MEAN GRADIENT: 3 MMHG
ECHO LVOT PEAK GRADIENT: 6 MMHG
ECHO LVOT PEAK VELOCITY: 1.2 M/S
ECHO LVOT VTI: 25 CM
ECHO MV A VELOCITY: 1.16 M/S
ECHO MV E DECELERATION TIME (DT): 173 MS
ECHO MV E VELOCITY: 0.94 M/S
ECHO MV E/A RATIO: 0.81
ECHO MV E/E' LATERAL: 12.89
ECHO PV MAX VELOCITY: 0.8 M/S
ECHO PV PEAK GRADIENT: 3 MMHG
ECHO RV BASAL DIMENSION: 2.9 CM
ECHO RV TAPSE: 2.4 CM (ref 1.7–?)

## 2025-09-04 PROCEDURE — 93306 TTE W/DOPPLER COMPLETE: CPT

## 2025-09-04 PROCEDURE — 6360000004 HC RX CONTRAST MEDICATION

## 2025-09-04 PROCEDURE — 71260 CT THORAX DX C+: CPT

## 2025-09-04 PROCEDURE — 93306 TTE W/DOPPLER COMPLETE: CPT | Performed by: INTERNAL MEDICINE

## 2025-09-04 RX ORDER — IOPAMIDOL 755 MG/ML
75 INJECTION, SOLUTION INTRAVASCULAR
Status: COMPLETED | OUTPATIENT
Start: 2025-09-04 | End: 2025-09-04

## 2025-09-04 RX ADMIN — IOPAMIDOL 75 ML: 755 INJECTION, SOLUTION INTRAVENOUS at 09:20

## (undated) DEVICE — CONNECTOR STR 3/8IN ST 050506000 375STRCONN

## (undated) DEVICE — SUTURE VICRYL SZ 2 L27IN ABSRB VLT L65MM TP-1 1/2 CIR J649G

## (undated) DEVICE — PAD GEN USE BORDERED ADH 14IN 2IN AND 12IN 4IN GZ UNIV ST

## (undated) DEVICE — SINGLE-USE BIOPSY FORCEPS: Brand: RADIAL JAW 4

## (undated) DEVICE — DRAIN SURG SGL COLL PT TB FOR ATS BG OASIS

## (undated) DEVICE — SUTURE MONOCRYL SZ 4-0 L27IN ABSRB UD L19MM PS-2 1/2 CIR PRIM Y426H

## (undated) DEVICE — ELECTRODE PT RET AD L9FT HI MOIST COND ADH HYDRGEL CORDED

## (undated) DEVICE — SUTURE VICRYL + SZ 0 L36IN ABSRB VLT L36MM CT-1 1/2 CIR VCP346H

## (undated) DEVICE — BLADE ES L6IN ELASTOMERIC COAT EXT DURABLE BEND UPTO 90DEG

## (undated) DEVICE — SPONGE,PEANUT,XRAY,ST,SM,3/8",5/CARD: Brand: MEDLINE INDUSTRIES, INC.

## (undated) DEVICE — SYRINGE MED 30ML STD CLR PLAS LUERLOCK TIP N CTRL DISP

## (undated) DEVICE — SUTURE VICRYL + SZ 2-0 L27IN ABSRB CLR CT-1 1/2 CIR TAPERCUT VCP259H

## (undated) DEVICE — THORACIC CATHETER,RIGHT ANGLE: Brand: ARGYLE

## (undated) DEVICE — PACK,UNIVERSAL,NO GOWNS: Brand: MEDLINE

## (undated) DEVICE — SPONGE LAP W18XL18IN WHT COT 4 PLY FLD STRUNG RADPQ DISP ST 2 PER PACK

## (undated) DEVICE — LIQUIBAND RAPID ADHESIVE 36/CS 0.8ML: Brand: MEDLINE

## (undated) DEVICE — SUTURE SOFSILK SZ 1 L30IN NABSORBABLE BLK C-17 L39MM 3/8 SS646

## (undated) DEVICE — ENDOSCOPIC CYTOLOGY BRUSH: Brand: CELLEBRITY

## (undated) DEVICE — CONNECTOR PERF 3/8X3/8X3/8IN EQL WYE

## (undated) DEVICE — BREAST HERNIA: Brand: MEDLINE INDUSTRIES, INC.

## (undated) DEVICE — SUTURE SOFSILK SZ 2 L30IN NONABSORBABLE WHT V-26 L37MM 1/2 CS746

## (undated) DEVICE — 3M™ IOBAN™ 2 ANTIMICROBIAL INCISE DRAPE 6651EZ: Brand: IOBAN™ 2

## (undated) DEVICE — CATHETER THOR STR 32F FIRM PVC

## (undated) DEVICE — BLADE ES L4IN INSUL EDGE

## (undated) DEVICE — DRAPE,INSTRUMENT,MAGNETIC,10X16: Brand: MEDLINE

## (undated) DEVICE — CONMED DISPOSABLE MICROBIOLOGY BRUSH, Ø1 MM, 1.8 MM WORKING DIAMETER, 110 CM LENGTH: Brand: CONMED